# Patient Record
Sex: MALE | Race: BLACK OR AFRICAN AMERICAN | Employment: FULL TIME | URBAN - METROPOLITAN AREA
[De-identification: names, ages, dates, MRNs, and addresses within clinical notes are randomized per-mention and may not be internally consistent; named-entity substitution may affect disease eponyms.]

---

## 2017-10-17 LAB
CHOLEST SERPL-MCNC: 202 MG/DL (ref 100–199)
CREAT SERPL-MCNC: 0.91 MG/DL (ref 0.72–1.25)
GFR SERPL CREATININE-BSD FRML MDRD: >60 ML/MIN/1.73M2
GLUCOSE SERPL-MCNC: 142 MG/DL (ref 65–100)
HDLC SERPL-MCNC: 35 MG/DL
LDLC SERPL CALC-MCNC: 105 MG/DL
NONHDLC SERPL-MCNC: 167 MG/DL
POTASSIUM SERPL-SCNC: 3.9 MMOL/L (ref 3.5–5)
TRIGL SERPL-MCNC: 311 MG/DL

## 2018-02-22 ENCOUNTER — TRANSFERRED RECORDS (OUTPATIENT)
Dept: HEALTH INFORMATION MANAGEMENT | Facility: CLINIC | Age: 38
End: 2018-02-22

## 2018-02-22 LAB
HBA1C MFR BLD: 7.4 % (ref 0–5.7)
TSH SERPL-ACNC: 0.44 ULU/ML (ref 0.35–4.94)

## 2018-04-18 NOTE — PROGRESS NOTES
SUBJECTIVE:   Wenceslao Hutson is a 37 year old male who presents to clinic today for the following health issues:  Pt Transferring care from [facility]  He has uncontrolled Diabetes-takes Insulin    Diabetes Follow-up    Patient is checking blood sugars: three times daily.   Blood sugar testing frequency justification: Adjustment of medication(s)  Results are as follows:         am - 250-300              postprandial after lunch- 330         bedtime - 350-400    Diabetic concerns: blood sugar frequently over 200     Symptoms of hypoglycemia (low blood sugar): none     Paresthesias (numbness or burning in feet) or sores: No     Date of last diabetic eye exam: November 28, 2017    Hyperlipidemia Follow-Up      Rate your low fat/cholesterol diet?: fair    Taking statin?  Yes, no muscle aches from statin    Other lipid medications/supplements?:  none    Hypertension Follow-up      Outpatient blood pressures are not being checked.    Low Salt Diet: no added salt    BP Readings from Last 2 Encounters:   04/19/18 124/86   03/21/14 120/82     Hemoglobin A1C (%)   Date Value   03/21/2014 11.5 (H)     LDL Cholesterol Calculated (mg/dL)   Date Value   03/21/2014     Cannot estimate LDL when triglyceride exceeds 400 mg/dL     LDL Cholesterol Direct (mg/dL)   Date Value   03/21/2014 66       Amount of exercise or physical activity: None    Problems taking medications regularly: No    Medication side effects: none    Diet: low salt and low fat/cholesterol  1-2 meals per day            Problem list and histories reviewed & adjusted, as indicated.  Additional history: as documented    Patient Active Problem List   Diagnosis     CARDIOVASCULAR SCREENING; LDL GOAL LESS THAN 160     Hyperlipidemia associated with type 2 diabetes mellitus (H)     Type 2 diabetes mellitus without complication, with long-term current use of insulin (H)     Hyperlipidemia LDL goal <100     Essential hypertension     Uncontrolled diabetes mellitus type 2  without complications, unspecified long term insulin use status (H)     Past Surgical History:   Procedure Laterality Date     SHOULDER SURGERY  2007-Left     TONSILLECTOMY  12/22/2011    Procedure:TONSILLECTOMY; Tonsillectomy; Surgeon:NAA CASIANO; Location: OR       Social History   Substance Use Topics     Smoking status: Former Smoker     Years: 3.00     Types: Cigarettes     Start date: 6/15/2005     Smokeless tobacco: Former User     Quit date: 12/22/2011      Comment: smoke free household.     Alcohol use Yes      Comment: rare     Family History   Problem Relation Age of Onset     Breast Cancer Mother          Current Outpatient Prescriptions   Medication Sig Dispense Refill     ACE/ARB/ARNI NOT PRESCRIBED, INTENTIONAL, Please choose reason not prescribed,as he is allergic       amLODIPine (NORVASC) 5 MG tablet Take 5 mg by mouth daily       blood glucose monitoring (NO BRAND SPECIFIED) test strip Use to test blood sugars 4 times daily or as directed 400 strip 3     EPINEPHrine (EPIPEN/ADRENACLICK/OR ANY BX GENERIC EQUIV) 0.3 MG/0.3ML injection 2-pack Inject 0.3 mg into the muscle       insulin aspart (NOVOLOG PEN) 100 UNIT/ML injection Inject 10 Units Subcutaneous 2 times daily 9 mL 0     insulin glargine (LANTUS SOLOSTAR) 100 UNIT/ML injection Inject 15 Units Subcutaneous At Bedtime 9 mL 0     metFORMIN (GLUCOPHAGE) 1000 MG tablet Take 1,000 mg by mouth 2 times daily (with meals)       rosuvastatin (CRESTOR) 10 MG tablet Take 1 tablet (10 mg) by mouth daily 30 tablet 3     bacitracin ophthalmic ointment Apply 1/2 inch to right eye every 4 hours for 7 days. (Patient not taking: Reported on 4/19/2018) 3.5 g 0     [DISCONTINUED] insulin aspart (NOVOLOG PEN) 100 UNIT/ML injection Inject 10 Units Subcutaneous 2 times daily       [DISCONTINUED] insulin glargine (LANTUS SOLOSTAR) 100 UNIT/ML injection Inject 10 Units Subcutaneous daily (with dinner)       [DISCONTINUED] metFORMIN (GLUCOPHAGE) 500 MG  "tablet Take 1 tablet (500 mg) by mouth 2 times daily (with meals) 60 tablet 2     Allergies   Allergen Reactions     Lisinopril Swelling     probable     Atorvastatin      Recent Labs   Lab Test  04/19/18   0804  03/21/14   0925  02/24/12   1052  01/06/12   0935   A1C  11.9*  11.5*   --    --    LDL   --   Cannot estimate LDL when triglyceride exceeds 400 mg/dL  66   --    --    HDL   --   30*   --    --    TRIG   --   640*   --    --    ALT   --    --   79*  167*   CR   --   0.87  0.97   --    GFRESTIMATED   --   >90  >90   --    GFRESTBLACK   --   >90  >90   --    POTASSIUM   --   4.1  4.4   --    TSH   --   0.96   --    --       BP Readings from Last 3 Encounters:   04/19/18 124/86   03/21/14 120/82   08/21/12 120/88    Wt Readings from Last 3 Encounters:   04/19/18 205 lb 12.8 oz (93.4 kg)   03/21/14 204 lb 3.2 oz (92.6 kg)   08/21/12 208 lb 12.8 oz (94.7 kg)                  Labs reviewed in EPIC    Reviewed and updated as needed this visit by clinical staff  Tobacco  Meds  Fam Hx  Soc Hx      Reviewed and updated as needed this visit by Provider         ROS:    REVIEW OF SYSTEMS:  Complete/DM ROS -  C: NEGATIVE for fatigue, unexpected change in weight  E: NEGATIVE for acute vision problems or changes  R: NEGATIVE for significant cough or shortness of breath  CV: NEGATIVE for chest pain, palpitations or new or worsening peripheral edema  M: NEGATIVE for claudication, myalgias  N: NEGATIVE for weakness, dizziness, syncope, headaches  P: NEGATIVE for changes in mood or affect        OBJECTIVE:     /86  Pulse 106  Temp 97.5  F (36.4  C) (Oral)  Resp 16  Ht 5' 4.53\" (1.639 m)  Wt 205 lb 12.8 oz (93.4 kg)  SpO2 95%  BMI 34.75 kg/m2  Body mass index is 34.75 kg/(m^2).    EXAM:  /86  Pulse 106  Temp 97.5  F (36.4  C) (Oral)  Resp 16  Ht 5' 4.53\" (1.639 m)  Wt 205 lb 12.8 oz (93.4 kg)  SpO2 95%  BMI 34.75 kg/m2      GENERAL APPEARANCE: alert and no acute distress  PSYCH: mentation appears " "normal and affect normal/bright  NECK:  No carotid bruits.  RESP: lungs clear to auscultation - no rales, rhonchi or wheezes  CV: regular rate and rhythm, normal S1 S2, no S3 or S4 and no murmur, click or rub -  EXT: no cyanosis or edema in lower extremities  SKIN: no venous stasis changes  Foot Exam: normal DP and PT pulses, no trophic changes or ulcerative lesions and normal sensory exam        Diagnostic Test Results:  Results for orders placed or performed in visit on 04/19/18 (from the past 24 hour(s))   Hemoglobin A1c   Result Value Ref Range    Hemoglobin A1C 11.9 (H) 0 - 5.6 %       ASSESSMENT/PLAN:         BMI:   Estimated body mass index is 34.75 kg/(m^2) as calculated from the following:    Height as of this encounter: 5' 4.53\" (1.639 m).    Weight as of this encounter: 205 lb 12.8 oz (93.4 kg).   Weight management plan: low inessa diet/Exercise      1. Uncontrolled diabetes mellitus type 2 without complications, unspecified long term insulin use status (H)    - Hemoglobin A1c  - ACE/ARB/ARNI NOT PRESCRIBED, INTENTIONAL,; Please choose reason not prescribed,as he is allergic  - insulin glargine (LANTUS SOLOSTAR) 100 UNIT/ML injection; Inject 15 Units Subcutaneous At Bedtime  Dispense: 9 mL; Refill: 0  - insulin aspart (NOVOLOG PEN) 100 UNIT/ML injection; Inject 10 Units Subcutaneous 2 times daily  Dispense: 9 mL; Refill: 0  - Lipid panel reflex to direct LDL Fasting  - Comprehensive metabolic panel  - Albumin Random Urine Quantitative with Creat Ratio  - blood glucose monitoring (NO BRAND SPECIFIED) test strip; Use to test blood sugars 4 times daily or as directed  Dispense: 400 strip; Refill: 3  - MED THERAPY MANAGE REFERRAL  - DIABETES EDUCATOR REFERRAL  Increase Lantus to 15 units  See Diabetic ed  See me 2 months  Send accuchecks to Diabetic ed q 1 week  Discussed How to increase Lantus-  But can e mail us on My chart   2. Hyperlipidemia LDL goal <100  Advised change to Crestor as he is on Norvasc  SEE " EPIC care orders  The potential side effects of this medication have been discussed with the patient.  Call if any significant problems with these are experienced.    - rosuvastatin (CRESTOR) 10 MG tablet; Take 1 tablet (10 mg) by mouth daily  Dispense: 30 tablet; Refill: 3    3. Essential hypertension  controlled    Obesity  Advised strict Diabetic diet  Discussed   Keke Randle MD  [facility]

## 2018-04-19 ENCOUNTER — TELEPHONE (OUTPATIENT)
Dept: FAMILY MEDICINE | Facility: CLINIC | Age: 38
End: 2018-04-19

## 2018-04-19 ENCOUNTER — OFFICE VISIT (OUTPATIENT)
Dept: FAMILY MEDICINE | Facility: CLINIC | Age: 38
End: 2018-04-19
Payer: COMMERCIAL

## 2018-04-19 VITALS
WEIGHT: 205.8 LBS | BODY MASS INDEX: 34.29 KG/M2 | SYSTOLIC BLOOD PRESSURE: 124 MMHG | TEMPERATURE: 97.5 F | RESPIRATION RATE: 16 BRPM | DIASTOLIC BLOOD PRESSURE: 86 MMHG | HEIGHT: 65 IN | OXYGEN SATURATION: 95 % | HEART RATE: 106 BPM

## 2018-04-19 DIAGNOSIS — E66.811 CLASS 1 OBESITY DUE TO EXCESS CALORIES WITH SERIOUS COMORBIDITY AND BODY MASS INDEX (BMI) OF 34.0 TO 34.9 IN ADULT: ICD-10-CM

## 2018-04-19 DIAGNOSIS — I10 ESSENTIAL HYPERTENSION: ICD-10-CM

## 2018-04-19 DIAGNOSIS — E78.5 HYPERLIPIDEMIA LDL GOAL <100: ICD-10-CM

## 2018-04-19 DIAGNOSIS — E66.09 CLASS 1 OBESITY DUE TO EXCESS CALORIES WITH SERIOUS COMORBIDITY AND BODY MASS INDEX (BMI) OF 34.0 TO 34.9 IN ADULT: ICD-10-CM

## 2018-04-19 PROBLEM — E11.69 HYPERLIPIDEMIA ASSOCIATED WITH TYPE 2 DIABETES MELLITUS (H): Status: ACTIVE | Noted: 2017-07-10

## 2018-04-19 PROBLEM — Z79.4 TYPE 2 DIABETES MELLITUS WITHOUT COMPLICATION, WITH LONG-TERM CURRENT USE OF INSULIN (H): Status: ACTIVE | Noted: 2018-04-19

## 2018-04-19 PROBLEM — E11.9 TYPE 2 DIABETES MELLITUS WITHOUT COMPLICATION, WITH LONG-TERM CURRENT USE OF INSULIN (H): Status: ACTIVE | Noted: 2018-04-19

## 2018-04-19 LAB
ALBUMIN SERPL-MCNC: 4 G/DL (ref 3.4–5)
ALP SERPL-CCNC: 77 U/L (ref 40–150)
ALT SERPL W P-5'-P-CCNC: 42 U/L (ref 0–70)
ANION GAP SERPL CALCULATED.3IONS-SCNC: 12 MMOL/L (ref 3–14)
AST SERPL W P-5'-P-CCNC: 13 U/L (ref 0–45)
BILIRUB SERPL-MCNC: 0.4 MG/DL (ref 0.2–1.3)
BUN SERPL-MCNC: 15 MG/DL (ref 7–30)
CALCIUM SERPL-MCNC: 8.9 MG/DL (ref 8.5–10.1)
CHLORIDE SERPL-SCNC: 99 MMOL/L (ref 94–109)
CHOLEST SERPL-MCNC: 209 MG/DL
CO2 SERPL-SCNC: 21 MMOL/L (ref 20–32)
CREAT SERPL-MCNC: 0.86 MG/DL (ref 0.66–1.25)
CREAT UR-MCNC: 42 MG/DL
GFR SERPL CREATININE-BSD FRML MDRD: >90 ML/MIN/1.7M2
GLUCOSE SERPL-MCNC: 424 MG/DL (ref 70–99)
HBA1C MFR BLD: 11.9 % (ref 0–5.6)
HDLC SERPL-MCNC: 38 MG/DL
LDLC SERPL CALC-MCNC: ABNORMAL MG/DL
LDLC SERPL DIRECT ASSAY-MCNC: 127 MG/DL
MICROALBUMIN UR-MCNC: 7 MG/L
MICROALBUMIN/CREAT UR: 17.38 MG/G CR (ref 0–17)
NONHDLC SERPL-MCNC: 171 MG/DL
POTASSIUM SERPL-SCNC: 4.4 MMOL/L (ref 3.4–5.3)
PROT SERPL-MCNC: 7.3 G/DL (ref 6.8–8.8)
SODIUM SERPL-SCNC: 132 MMOL/L (ref 133–144)
TRIGL SERPL-MCNC: 700 MG/DL

## 2018-04-19 PROCEDURE — 83036 HEMOGLOBIN GLYCOSYLATED A1C: CPT | Performed by: FAMILY MEDICINE

## 2018-04-19 PROCEDURE — 99214 OFFICE O/P EST MOD 30 MIN: CPT | Performed by: FAMILY MEDICINE

## 2018-04-19 PROCEDURE — 82043 UR ALBUMIN QUANTITATIVE: CPT | Performed by: FAMILY MEDICINE

## 2018-04-19 PROCEDURE — 80053 COMPREHEN METABOLIC PANEL: CPT | Performed by: FAMILY MEDICINE

## 2018-04-19 PROCEDURE — 36415 COLL VENOUS BLD VENIPUNCTURE: CPT | Performed by: FAMILY MEDICINE

## 2018-04-19 PROCEDURE — 83721 ASSAY OF BLOOD LIPOPROTEIN: CPT | Mod: 59 | Performed by: FAMILY MEDICINE

## 2018-04-19 PROCEDURE — 80061 LIPID PANEL: CPT | Performed by: FAMILY MEDICINE

## 2018-04-19 RX ORDER — ROSUVASTATIN CALCIUM 10 MG/1
10 TABLET, COATED ORAL DAILY
Qty: 30 TABLET | Refills: 3 | Status: SHIPPED | OUTPATIENT
Start: 2018-04-19 | End: 2018-08-09

## 2018-04-19 RX ORDER — SIMVASTATIN 20 MG
20 TABLET ORAL AT BEDTIME
COMMUNITY
Start: 2018-02-22 | End: 2018-04-19

## 2018-04-19 RX ORDER — AMLODIPINE BESYLATE 5 MG/1
5 TABLET ORAL DAILY
COMMUNITY
Start: 2018-02-22 | End: 2018-08-09

## 2018-04-19 RX ORDER — EPINEPHRINE 0.3 MG/.3ML
0.3 INJECTION SUBCUTANEOUS
COMMUNITY
Start: 2016-03-02 | End: 2021-03-08

## 2018-04-19 ASSESSMENT — PAIN SCALES - GENERAL: PAINLEVEL: NO PAIN (0)

## 2018-04-19 NOTE — Clinical Note
Please abstract the following data from this visit with this patient into the appropriate field in Epic:  Eye exam with ophthalmology on this date: 11/28/17 [facility] A1C 7.4 02/22/18, Lipids 10/17/17 all results in care everywhere.

## 2018-04-19 NOTE — TELEPHONE ENCOUNTER
Notes Recorded by Keke Randle MD on 4/19/2018 at 4:31 PM  LEFT MESSAGE TO CALL  Blood sugar very High  Pt should Give Himself an extra 6 units Novolog  Check accuchecks and send us readings  Make appointment with Diabetic ed  LEFT MESSAGE TO CALL    Voice mail left for patient to call RN hotline at [phone].    Eulalia Luis RN

## 2018-04-19 NOTE — MR AVS SNAPSHOT
After Visit Summary   4/19/2018    Wenceslao Hutson    MRN: [id number]           Patient Information     Date Of Birth          1980        Visit Information        Provider Department      4/19/2018 7:40 AM Keke Randle MD [facility]        Today's Diagnoses     Uncontrolled diabetes mellitus type 2 without complications, unspecified long term insulin use status (H)    -  1    Hyperlipidemia LDL goal <100        Essential hypertension          Care Instructions    [facility]    If you have any questions regarding to your visit please contact your care team:       Team Red:   Clinic Hours Telephone Number   Dr. Michelle Quinteros, NP   7am-7pm  Monday - Thursday   7am-5pm  Fridays  [phone]  (Appointment scheduling available 24/7)    Questions about your visit?   Team Line  [phone]   Urgent [facility] - [place] [facility] - 11am-9pm Monday-Friday Saturday-Sunday- 9am-5pm   [place] - 5pm-9pm Monday-Friday Saturday-Sunday- 9am-5pm  [phone] - [facility]  [phone] - [place]       What options do I have for visits at the clinic other than the traditional office visit?  To expand how we care for you, many of our providers are utilizing electronic visits (e-visits) and telephone visits, when medically appropriate, for interactions with their patients rather than a visit in the clinic.   We also offer nurse visits for many medical concerns. Just like any other service, we will bill your insurance company for this type of visit based on time spent on the phone with your provider. Not all insurance companies cover these visits. Please check with your medical insurance if this type of visit is covered. You will be responsible for any charges that are not paid by your insurance.      E-visits via Inflection:  generally incur a $35.00 fee.  Telephone visits:  Time spent on the phone: *charged based on  time that is spent on the phone in increments of 10 minutes. Estimated cost:   5-10 mins $30.00   11-20 mins. $59.00   21-30 mins. $85.00     Use Screenleaphart (secure email communication and access to your chart) to send your primary care provider a message or make an appointment. Ask someone on your Team how to sign up for FreshBookst.  For a Price Quote for your services, please call our RapidMind Line at [phone].      As always, Thank you for trusting us with your health care needs!            Follow-ups after your visit        Additional Services     DIABETES EDUCATOR REFERRAL       Your provider has referred you to Diabetes Education: FMG: Diabetes Education - All [facility] [phone]   [email or web address]     If an urgent visit is needed or A1C is above 12, Care Team to call the Diabetes  Education Team at [phone] or send an In Basket message to the Diabetes Education Pool (P DIAB ED-PATIENT CARE).    This is a Previous Diagnosis: Follow-up DSMT - 2 hours.  Type of diabetes is Type 2 - On Insulin   Medicare covers: 10 hours of initial DSMT in 12 month period from the time of first visit, plus 2 hours of follow-up DSMT annually, and additional hours as requested for insulin training.         Diabetes Co-Morbidities: dyslipidemia and hypertension               A1C Goal:  <7.0       A1C is: Lab Results       Component                Value               Date                       A1C                      11.9                04/19/2018              MEDICAL NUTRITION THERAPY (MNT) for Diabetes    Medical Nutrition Therapy with a Registered Dietitian can be provided in coordination with Diabetes Self-Management Training to assist in achieving optimal diabetes management.    MNT Type and Hours: Previous diagnosis: Annual follow-up MNT - 2 hours                       Medicare will cover: 3 hours initial MNT in 12 month period after first visit, plus 2  hours of follow-up MNT annually                                                          A1C is: Lab Results       Component                Value               Date                       A1C                      11.9                04/19/2018            If an urgent visit is needed or A1C is above 12, Care Team to call the diabetes education team at [phone] or send a message to the diabetes education pool (P DIAB ED-PATIENT CARE).    Diabetes education focus: Comprehensive Knowledge Assessment and Instruction, Knowledge: Healthy Eating, Being Active, Monitoring Blood Sugar, Taking Medication, Problem Solving/Goal Setting, Reducing Risks (Preventing Acute and Chronic Diabetes Complications) and Healthy Coping and Medication Start: Insulin: adjust dose      Education needs: Additional Insulin Training                                                                                                                                                      Please be aware that coverage of these services is subject to the terms and limitations of your health insurance plan.  Call member services at your health plan to determine Diabetes Self-Management Training benefits and ask which blood glucose monitor brands are covered by your plan.      Please bring the following to your appointment:    -   List of current medications   -   List of blood glucose monitor brands that are covered by your insurance plan  -   Blood glucose monitor and log book  -   Food records for the 3 days prior to your visit            MED THERAPY MANAGE REFERRAL       Your provider has referred you to: **[facility]uling (numerous locations) [phone]   [email or web address]  FMG: [facility] [phone]   [email or web address]    Reason for Referral:     The [facility] department  will contact you to schedule an appointment.  You may also schedule the appointment by calling [phone].  For [place] [facility] [place] patients, please call [phone] to confirm/schedule your appointment on the next business day.    This service is designed to help you get the most from your medications.  A specially trained Pharmacist will work closely with you and your providers to solve any questions, concerns, issues or problems related to your medications.    Please bring all of your prescription and non-prescription medications (such as vitamins, over-the-counter medications, and herbals) or a detailed medication list to your appointment.    If you have a glucose meter or other home monitoring information, please also bring this to your appointment (i.e. blood glucose log, blood pressure log, pain log, etc.).                  Who to contact     If you have questions or need follow up information about today's clinic visit or your schedule please contact [facility] directly at [phone].  Normal or non-critical lab and imaging results will be communicated to you by Haoguihuahart, letter or phone within 4 business days after the clinic has received the results. If you do not hear from us within 7 days, please contact the clinic through [place] [employer] or phone. If you have a critical or abnormal lab result, we will notify you by phone as soon as possible.  Submit refill requests through Lumi Mobile or call your pharmacy and they will forward the refill request to us. Please allow 3 business days for your refill to be completed.          Additional Information About Your Visit        HaoguihuaharChipCare Information     Lumi Mobile gives you secure access to your electronic health record. If you see a primary care provider, you can also send messages to your care team and make appointments. If you have questions, please call your primary care clinic.  If you do not have a primary care provider, please call [phone]  "and they will assist you.        Care EveryWhere ID     This is your Care EveryWhere ID. This could be used by other organizations to access your [place] medical records  [id number]        Your Vitals Were     Pulse Temperature Respirations Height Pulse Oximetry BMI (Body Mass Index)    106 97.5  F (36.4  C) (Oral) 16 5' 4.53\" (1.639 m) 95% 34.75 kg/m2       Blood Pressure from Last 3 Encounters:   04/19/18 124/86   03/21/14 120/82   08/21/12 120/88    Weight from Last 3 Encounters:   04/19/18 205 lb 12.8 oz (93.4 kg)   03/21/14 204 lb 3.2 oz (92.6 kg)   08/21/12 208 lb 12.8 oz (94.7 kg)              We Performed the Following     Albumin Random Urine Quantitative with Creat Ratio     Comprehensive metabolic panel     DIABETES EDUCATOR REFERRAL     Hemoglobin A1c     Lipid panel reflex to direct LDL Fasting     MED THERAPY MANAGE REFERRAL          Today's Medication Changes          These changes are accurate as of 4/19/18  8:38 AM.  If you have any questions, ask your nurse or doctor.               Start taking these medicines.        Dose/Directions    ACE/ARB/ARNI NOT PRESCRIBED (INTENTIONAL)   Used for:  Uncontrolled diabetes mellitus type 2 without complications, unspecified long term insulin use status (H)   Started by:  Keke Randle MD        Please choose reason not prescribed,as he is allergic   Refills:  0       blood glucose monitoring test strip   Commonly known as:  no brand specified   Used for:  Uncontrolled diabetes mellitus type 2 without complications, unspecified long term insulin use status (H)   Started by:  Keke Randle MD        Use to test blood sugars 4 times daily or as directed   Quantity:  400 strip   Refills:  3       rosuvastatin 10 MG tablet   Commonly known as:  CRESTOR   Used for:  Hyperlipidemia LDL goal <100   Started by:  Keke Randle MD        Dose:  10 mg   Take 1 tablet (10 mg) by mouth daily   Quantity:  30 tablet   Refills:  3         These medicines have changed or " have updated prescriptions.        Dose/Directions    insulin glargine 100 UNIT/ML injection   Commonly known as:  LANTUS SOLOSTAR   This may have changed:    - how much to take  - when to take this   Used for:  Uncontrolled diabetes mellitus type 2 without complications, unspecified long term insulin use status (H)   Changed by:  Keke Randle MD        Dose:  15 Units   Inject 15 Units Subcutaneous At Bedtime   Quantity:  9 mL   Refills:  0       metFORMIN 1000 MG tablet   Commonly known as:  GLUCOPHAGE   This may have changed:  Another medication with the same name was removed. Continue taking this medication, and follow the directions you see here.   Changed by:  Keke Randle MD        Dose:  1000 mg   Take 1,000 mg by mouth 2 times daily (with meals)   Refills:  0         Stop taking these medicines if you haven't already. Please contact your care team if you have questions.     simvastatin 20 MG tablet   Commonly known as:  ZOCOR   Stopped by:  Keke Randle MD                Where to get your medicines      These medications were sent to [facility] [address] [place], [place] [address]E [place] AT [place] [address], [facility] [address]     Phone:  [phone]     blood glucose monitoring test strip    insulin aspart 100 UNIT/ML injection    insulin glargine 100 UNIT/ML injection    rosuvastatin 10 MG tablet         Some of these will need a paper prescription and others can be bought over the counter.  Ask your nurse if you have questions.     You don't need a prescription for these medications     ACE/ARB/ARNI NOT PRESCRIBED (INTENTIONAL)                Primary Care Provider Office Phone # Fax Erica Agrawal PA-C [phone] [address] [address]        Equal Access to Services     CRISTIANO BHANDARI AH: Airam Esteban, wage keene, qaybta arik araujo.  So [facility] [phone].    ATENCIÓN: Si sebas benavides, tiene a donato disposición servicios gratuitos de asistencia lingüística. Vicky more [phone].    We comply with applicable federal civil rights laws and [place] laws. We do not discriminate on the basis of race, color, national origin, age, disability, sex, sexual orientation, or gender identity.            Thank you!     Thank you for choosing [facility]3] [facility]  for your [facility]. Our goal is always to provide you with excellent care. Hearing back from our patients is one way we can continue to improve our services. Please take a few minutes to complete the written survey that you may receive in the mail after your visit with us. Thank you!             Your Updated Medication List - Protect others around you: Learn how to safely use, store and throw away your medicines at www.disposemymeds.org.          This list is accurate as of 4/19/18  8:38 AM.  Always use your most recent med list.                   Brand Name Dispense Instructions for use Diagnosis    ACE/ARB/ARNI NOT PRESCRIBED (INTENTIONAL)      Please choose reason not prescribed,as he is allergic    Uncontrolled diabetes mellitus type 2 without complications, unspecified long term insulin use status (H)       amLODIPine 5 MG tablet    NORVASC     Take 5 mg by mouth daily        bacitracin ophthalmic ointment     3.5 g    Apply 1/2 inch to right eye every 4 hours for 7 days.    Chalazions       blood glucose monitoring test strip    no brand specified    400 strip    Use to test blood sugars 4 times daily or as directed    Uncontrolled diabetes mellitus type 2 without complications, unspecified long term insulin use status (H)       EPINEPHrine 0.3 MG/0.3ML injection 2-pack    EPIPEN/ADRENACLICK/or ANY BX GENERIC EQUIV     Inject 0.3 mg into the muscle        insulin aspart 100 UNIT/ML injection    NovoLOG PEN    9 mL    Inject 10 Units Subcutaneous 2 times daily    Uncontrolled diabetes mellitus type  2 without complications, unspecified long term insulin use status (H)       insulin glargine 100 UNIT/ML injection    LANTUS SOLOSTAR    9 mL    Inject 15 Units Subcutaneous At Bedtime    Uncontrolled diabetes mellitus type 2 without complications, unspecified long term insulin use status (H)       metFORMIN 1000 MG tablet    GLUCOPHAGE     Take 1,000 mg by mouth 2 times daily (with meals)        rosuvastatin 10 MG tablet    CRESTOR    30 tablet    Take 1 tablet (10 mg) by mouth daily    Hyperlipidemia LDL goal <100

## 2018-04-19 NOTE — PATIENT INSTRUCTIONS
[facility]    If you have any questions regarding to your visit please contact your care team:       Team Red:   Clinic Hours Telephone Number   Dr. Michelle Quinteros, NP   7am-7pm  Monday - Thursday   7am-5pm  Fridays  [phone]  (Appointment scheduling available 24/7)    Questions about your visit?   Team Line  [phone]   [facility] 11am-9pm Monday-Friday Saturday-Sunday- 9am-5pm   [place] - 5pm-9pm Monday-Friday Saturday-Sunday- 9am-5pm  [phone] - [place]   [phone] - [place]       What options do I have for visits at the clinic other than the traditional office visit?  To expand how we care for you, many of our providers are utilizing electronic visits (e-visits) and telephone visits, when medically appropriate, for interactions with their patients rather than a visit in the clinic.   We also offer nurse visits for many medical concerns. Just like any other service, we will bill your insurance company for this type of visit based on time spent on the phone with your provider. Not all insurance companies cover these visits. Please check with your medical insurance if this type of visit is covered. You will be responsible for any charges that are not paid by your insurance.      E-visits via [employer]:  generally incur a $35.00 fee.  Telephone visits:  Time spent on the phone: *charged based on time that is spent on the phone in increments of 10 minutes. Estimated cost:   5-10 mins $30.00   11-20 mins. $59.00   21-30 mins. $85.00     Use [employer] (secure email communication and access to your chart) to send your primary care provider a message or make an appointment. Ask someone on your Team how to sign up for [employer].  For a Price Quote for your services, please call our Consumer Price Line at [phone].      As always, Thank you for trusting us with your health care needs!

## 2018-04-20 NOTE — TELEPHONE ENCOUNTER
Pt called back to the RN hotline and left message requesting call back to [phone].      Emilia Mota RN  [facility]

## 2018-04-20 NOTE — TELEPHONE ENCOUNTER
FYI- patient did not answer his phone or call back yesterday to get message below about taking extra 6 units of Novolog for high BG.   Attempted to call patient again today but got VM  Please ask what his BG results are today  Also update him on the rest of the result note below from today    Result Notes   Notes Recorded by Devon Capone RN on 4/20/2018 at 8:10 AM  Left message on voicemail for patient to return call to RN hotline at # [phone].    See phone encounter started  Mychart result note sent    Devon Capone RN    ------    Notes Recorded by Keke Randle MD on 4/20/2018 at 7:45 AM  Graham Hanley,  Your Blood sugars are very High  Please check Your Blood sugars and Give your self Novolog  I will Give you a sliding scale to take your Novolog    150-200  - 2 units Novolog  201-250-  4 units Novolog  251-300- 6 units Novolog  301-350-  8 units of Novolog  Please call if Higher than this  Please increase lantus as recommended   Please make appointment with Diabetic Educator or our Pharmacist this month so we can get your sugars to Goal.  Your  cholesterol is High -please change to Crestor as recommended   Take care  Keke Randle MD

## 2018-04-24 NOTE — TELEPHONE ENCOUNTER
Sent pt a my chart to check if was informed of the results.  Pt does have upcoming appts scheduled as stated below.  Yesika Moseley RN

## 2018-04-26 NOTE — TELEPHONE ENCOUNTER
Left message on voicemail for patient to return call to RN hotline at # [phone].    BG from 4/19/18 labs were high and Dr. Randle had recommended extra 6 units of Novolog that day. However, patient did not call back  How are his BGs now?    Devon Capone RN

## 2018-05-01 NOTE — TELEPHONE ENCOUNTER
Noted, patient has appointment with Diabetic Ed on 5/15.  Patient has not viewed ShoutOut message from 4/24.  Patient has not returned VM x6 attempts.     Shaylee Vick RN

## 2018-06-14 ENCOUNTER — TELEPHONE (OUTPATIENT)
Dept: PHARMACY | Facility: CLINIC | Age: 38
End: 2018-06-14

## 2018-06-14 NOTE — TELEPHONE ENCOUNTER
Pt no-showed scheduled MTM appt on 5/15/18 and has not responded to contact attempts to reschedule.  We will stop reaching out to him at this time, but please let us know if we can assist in his care in the future.  Routing to PCP - thank you for the referral!    Mel Wade, PharmD, BCACP  Medication Therapy Management Provider  Pager: [phone]

## 2018-07-26 ENCOUNTER — TELEPHONE (OUTPATIENT)
Dept: FAMILY MEDICINE | Facility: CLINIC | Age: 38
End: 2018-07-26

## 2018-07-26 NOTE — LETTER
July 26, 2018          Wenceslao Hutson,  [address]  [place] [address]        Dear Wenceslao Hutson      Monitoring and managing your preventative and chronic health conditions are very important to us. Our records indicate that you have not scheduled for Diabetic Check  which was recommended by Dr. Randle      If you have received your health care elsewhere, please call the clinic so the information can be documented in your chart.    Please call [phone] or message us through your [email or web address] account to schedule an appointment or provide information for your chart.     Feel free to contact us if you have any questions or concerns!    I look forward to seeing you and working with you on your health care needs.     Sincerely,         Keke Randle / JAYJAY

## 2018-07-26 NOTE — TELEPHONE ENCOUNTER
Panel Management Review      Patient has the following on his problem list:     Diabetes    ASA: Passed    Last A1C  Lab Results   Component Value Date    A1C 11.9 04/19/2018    A1C 7.4 02/22/2018    A1C 11.5 03/21/2014     A1C tested: FAILED    Last LDL:    Lab Results   Component Value Date    CHOL 209 04/19/2018     Lab Results   Component Value Date    HDL 38 04/19/2018     Lab Results   Component Value Date    LDL  04/19/2018     Cannot estimate LDL when triglyceride exceeds 400 mg/dL     04/19/2018     Lab Results   Component Value Date    TRIG 700 04/19/2018     Lab Results   Component Value Date    CHOLHDLRATIO 7.6 03/21/2014     Lab Results   Component Value Date    NHDL 171 04/19/2018       Is the patient on a Statin? YES             Is the patient on Aspirin? YES    Medications     HMG CoA Reductase Inhibitors    rosuvastatin (CRESTOR) 10 MG tablet          Last three blood pressure readings:  BP Readings from Last 3 Encounters:   04/19/18 124/86   03/21/14 120/82   08/21/12 120/88       Date of last diabetes office visit: 4/19/2018     Tobacco History:     History   Smoking Status     Former Smoker     Years: 3.00     Types: Cigarettes     Start date: 6/15/2005   Smokeless Tobacco     Former User     Quit date: 12/22/2011     Comment: smoke free household.         Hypertension   Last three blood pressure readings:  BP Readings from Last 3 Encounters:   04/19/18 124/86   03/21/14 120/82   08/21/12 120/88     Blood pressure: Passed    HTN Guidelines:  Age 18-59 BP range:  Less than 140/90  Age 60-85 with Diabetes:  Less than 140/90  Age 60-85 without Diabetes:  less than 150/90      Composite cancer screening  Chart review shows that this patient is due/due soon for the following None  Summary:    Patient is due/failing the following:   FOLLOW UP    Action needed:   Patient needs office visit for Diabetes check.    Type of outreach:    Sent letter.    Questions for provider review:    None                                                                                                                                     Sandra Velazquez routed to Care Team .

## 2018-08-09 ENCOUNTER — OFFICE VISIT (OUTPATIENT)
Dept: FAMILY MEDICINE | Facility: CLINIC | Age: 38
End: 2018-08-09
Payer: COMMERCIAL

## 2018-08-09 VITALS
OXYGEN SATURATION: 97 % | BODY MASS INDEX: 31.99 KG/M2 | WEIGHT: 192 LBS | TEMPERATURE: 97.8 F | SYSTOLIC BLOOD PRESSURE: 130 MMHG | RESPIRATION RATE: 16 BRPM | DIASTOLIC BLOOD PRESSURE: 80 MMHG | HEIGHT: 65 IN | HEART RATE: 102 BPM

## 2018-08-09 DIAGNOSIS — E66.811 CLASS 1 OBESITY DUE TO EXCESS CALORIES WITH SERIOUS COMORBIDITY AND BODY MASS INDEX (BMI) OF 34.0 TO 34.9 IN ADULT: ICD-10-CM

## 2018-08-09 DIAGNOSIS — I10 ESSENTIAL HYPERTENSION: Primary | ICD-10-CM

## 2018-08-09 DIAGNOSIS — E78.5 HYPERLIPIDEMIA ASSOCIATED WITH TYPE 2 DIABETES MELLITUS (H): ICD-10-CM

## 2018-08-09 DIAGNOSIS — E66.09 CLASS 1 OBESITY DUE TO EXCESS CALORIES WITH SERIOUS COMORBIDITY AND BODY MASS INDEX (BMI) OF 34.0 TO 34.9 IN ADULT: ICD-10-CM

## 2018-08-09 DIAGNOSIS — E11.69 HYPERLIPIDEMIA ASSOCIATED WITH TYPE 2 DIABETES MELLITUS (H): ICD-10-CM

## 2018-08-09 DIAGNOSIS — E11.9 TYPE 2 DIABETES MELLITUS WITHOUT COMPLICATION, WITHOUT LONG-TERM CURRENT USE OF INSULIN (H): ICD-10-CM

## 2018-08-09 LAB — HBA1C MFR BLD: 5.6 % (ref 0–5.6)

## 2018-08-09 PROCEDURE — 36415 COLL VENOUS BLD VENIPUNCTURE: CPT | Performed by: FAMILY MEDICINE

## 2018-08-09 PROCEDURE — 83036 HEMOGLOBIN GLYCOSYLATED A1C: CPT | Performed by: FAMILY MEDICINE

## 2018-08-09 PROCEDURE — 80061 LIPID PANEL: CPT | Performed by: FAMILY MEDICINE

## 2018-08-09 PROCEDURE — 80048 BASIC METABOLIC PNL TOTAL CA: CPT | Performed by: FAMILY MEDICINE

## 2018-08-09 PROCEDURE — 99214 OFFICE O/P EST MOD 30 MIN: CPT | Performed by: FAMILY MEDICINE

## 2018-08-09 RX ORDER — ROSUVASTATIN CALCIUM 10 MG/1
10 TABLET, COATED ORAL DAILY
Qty: 30 TABLET | Refills: 3 | Status: SHIPPED | OUTPATIENT
Start: 2018-08-09 | End: 2018-11-30

## 2018-08-09 RX ORDER — AMLODIPINE BESYLATE 5 MG/1
5 TABLET ORAL DAILY
Qty: 90 TABLET | Refills: 3 | Status: SHIPPED | OUTPATIENT
Start: 2018-08-09 | End: 2018-11-30

## 2018-08-09 RX ORDER — GLUCOSAMINE HCL/CHONDROITIN SU 500-400 MG
CAPSULE ORAL
Qty: 100 EACH | Refills: 3 | Status: SHIPPED | OUTPATIENT
Start: 2018-08-09 | End: 2021-01-11

## 2018-08-09 RX ORDER — LANCETS
EACH MISCELLANEOUS
Qty: 100 EACH | Refills: 6 | Status: SHIPPED | OUTPATIENT
Start: 2018-08-09 | End: 2021-01-11

## 2018-08-09 ASSESSMENT — PAIN SCALES - GENERAL: PAINLEVEL: MODERATE PAIN (5)

## 2018-08-09 NOTE — PROGRESS NOTES
SUBJECTIVE:   Wenceslao Hutson is a 38 year old male who presents to clinic today for the following health issues:        Diabetes Follow-up      Patient is checking blood sugars: not at all    Diabetic concerns: None     Symptoms of hypoglycemia (low blood sugar): none     Paresthesias (numbness or burning in feet) or sores: No     Date of last diabetic eye exam: within last 6 months    BP Readings from Last 2 Encounters:   04/19/18 124/86   03/21/14 120/82     Hemoglobin A1C (%)   Date Value   04/19/2018 11.9 (H)   02/22/2018 7.4 (A)     LDL Cholesterol Calculated (mg/dL)   Date Value   04/19/2018     Cannot estimate LDL when triglyceride exceeds 400 mg/dL   10/17/2017 105     LDL Cholesterol Direct (mg/dL)   Date Value   04/19/2018 127 (H)       Diabetes Management Resources  Hyperlipidemia Follow-Up      Rate your low fat/cholesterol diet?: poor    Taking statin?  Yes, no muscle aches from statin    Other lipid medications/supplements?:  none      Amount of exercise or physical activity: None    Problems taking medications regularly: No    Medication side effects: none    Diet:  Diabetic        Hypertension Follow-up      Outpatient blood pressures are not being checked.    Low Salt Diet: no added salt      Problem list and histories reviewed & adjusted, as indicated.  Additional history: as documented    Patient Active Problem List   Diagnosis     CARDIOVASCULAR SCREENING; LDL GOAL LESS THAN 160     Hyperlipidemia associated with type 2 diabetes mellitus (H)     Type 2 diabetes mellitus without complication, with long-term current use of insulin (H)     Hyperlipidemia LDL goal <100     Essential hypertension     Uncontrolled diabetes mellitus type 2 without complications, unspecified long term insulin use status (H)     Class 1 obesity due to excess calories with serious comorbidity and body mass index (BMI) of 34.0 to 34.9 in adult     Past Surgical History:   Procedure Laterality Date     SHOULDER SURGERY   2007-Left     TONSILLECTOMY  12/22/2011    Procedure:TONSILLECTOMY; Tonsillectomy; Surgeon:NAA CASIANO; Location: OR       Social History   Substance Use Topics     Smoking status: Former Smoker     Years: 3.00     Types: Cigarettes     Start date: 6/15/2005     Smokeless tobacco: Former User     Quit date: 12/22/2011      Comment: smoke free household.     Alcohol use Yes      Comment: rare     Family History   Problem Relation Age of Onset     Breast Cancer Mother          Current Outpatient Prescriptions   Medication Sig Dispense Refill     ACE/ARB/ARNI NOT PRESCRIBED, INTENTIONAL, Please choose reason not prescribed,as he is allergic       alcohol swab prep pads Use to swab area of injection/sydni as directed. 100 each 3     amLODIPine (NORVASC) 5 MG tablet Take 1 tablet (5 mg) by mouth daily 90 tablet 3     blood glucose calibration (NO BRAND SPECIFIED) solution To accompany: Blood Glucose Monitor Brands: per insurance. 1 Bottle 3     blood glucose monitoring (NO BRAND SPECIFIED) meter device kit Use to test blood sugar 1 times daily or as directed. Preferred blood glucose meter OR supplies to accompany: Blood Glucose Monitor Brands: per insurance. 1 kit 0     blood glucose monitoring (NO BRAND SPECIFIED) test strip Use to test blood sugar 1 times daily or as directed. To accompany: Blood Glucose Monitor Brands: per insurance. 100 strip 6     EPINEPHrine (EPIPEN/ADRENACLICK/OR ANY BX GENERIC EQUIV) 0.3 MG/0.3ML injection 2-pack Inject 0.3 mg into the muscle       metFORMIN (GLUCOPHAGE) 1000 MG tablet Take 1 tablet (1,000 mg) by mouth 2 times daily (with meals) 60 tablet 3     rosuvastatin (CRESTOR) 10 MG tablet Take 1 tablet (10 mg) by mouth daily 30 tablet 3     thin (NO BRAND SPECIFIED) lancets Use with lanceting device. To accompany: Blood Glucose Monitor Brands: per insurance. 100 each 6     bacitracin ophthalmic ointment Apply 1/2 inch to right eye every 4 hours for 7 days. (Patient not taking:  Reported on 4/19/2018) 3.5 g 0     blood glucose monitoring (NO BRAND SPECIFIED) test strip Use to test blood sugars 4 times daily or as directed (Patient not taking: Reported on 8/9/2018) 400 strip 3     insulin aspart (NOVOLOG PEN) 100 UNIT/ML injection Inject 10 Units Subcutaneous 2 times daily (Patient not taking: Reported on 8/9/2018) 9 mL 0     insulin glargine (LANTUS SOLOSTAR) 100 UNIT/ML injection Inject 15 Units Subcutaneous At Bedtime (Patient not taking: Reported on 8/9/2018) 9 mL 0     [DISCONTINUED] amLODIPine (NORVASC) 5 MG tablet Take 5 mg by mouth daily       [DISCONTINUED] metFORMIN (GLUCOPHAGE) 1000 MG tablet Take 1,000 mg by mouth 2 times daily (with meals)       [DISCONTINUED] rosuvastatin (CRESTOR) 10 MG tablet Take 1 tablet (10 mg) by mouth daily 30 tablet 3     Allergies   Allergen Reactions     Lisinopril Swelling     probable     Atorvastatin      Recent Labs   Lab Test  08/09/18   1420  04/19/18   0804  04/19/18   0800 02/22/18 10/17/17  03/21/14   0925   02/24/12   1052   01/06/12   0935   A1C  5.6  11.9*   --   7.4*   --   11.5*   < >   --    --    --    LDL   --    --   Cannot estimate LDL when triglyceride exceeds 400 mg/dL  127*   --   105  Cannot estimate LDL when triglyceride exceeds 400 mg/dL  66   < >   --    --    --    HDL   --    --   38*   --   35*  30*   --    --    --    --    TRIG   --    --   700*   --   311*  640*   --    --    --    --    ALT   --    --   42   --    --    --    --   79*   --   167*   CR   --    --   0.86   --   0.91  0.87   --   0.97   < >   --    GFRESTIMATED   --    --   >90   --   >60  >90   --   >90   < >   --    GFRESTBLACK   --    --   >90   --   >60  >90   --   >90   < >   --    POTASSIUM   --    --   4.4   --   3.9  4.1   --   4.4   < >   --    TSH   --    --    --   0.44   --   0.96   --    --    --    --     < > = values in this interval not displayed.      BP Readings from Last 3 Encounters:   08/09/18 130/80   04/19/18 124/86   03/21/14  "120/82    Wt Readings from Last 3 Encounters:   08/09/18 192 lb (87.1 kg)   04/19/18 205 lb 12.8 oz (93.4 kg)   03/21/14 204 lb 3.2 oz (92.6 kg)                  Labs reviewed in EPIC    Reviewed and updated as needed this visit by clinical staff       Reviewed and updated as needed this visit by Provider         ROS:  CONSTITUTIONAL: NEGATIVE for fever, chills, change in weight  ENT/MOUTH: NEGATIVE for ear, mouth and throat problems  RESP: NEGATIVE for significant cough or SOB  CV: NEGATIVE for chest pain, palpitations or peripheral edema  GI: NEGATIVE for nausea, abdominal pain, heartburn, or change in bowel habits  MUSCULOSKELETAL: NEGATIVE for significant arthralgias or myalgia  ROS otherwise negative    OBJECTIVE:     /80  Pulse 102  Temp 97.8  F (36.6  C) (Oral)  Resp 16  Ht 5' 4.5\" (1.638 m)  Wt 192 lb (87.1 kg)  SpO2 97%  BMI 32.45 kg/m2  Body mass index is 32.45 kg/(m^2).  GENERAL: healthy, alert and no distress  NECK: no adenopathy, no asymmetry, masses, or scars and thyroid normal to palpation  RESP: lungs clear to auscultation - no rales, rhonchi or wheezes  CV: regular rate and rhythm, normal S1 S2, no S3 or S4, no murmur, click or rub, no peripheral edema and peripheral pulses strong  ABDOMEN: soft, nontender, no hepatosplenomegaly, no masses and bowel sounds normal  MS: no gross musculoskeletal defects noted, no edema    Diagnostic Test Results:  Results for orders placed or performed in visit on 08/09/18 (from the past 24 hour(s))   Hemoglobin A1c   Result Value Ref Range    Hemoglobin A1C 5.6 0 - 5.6 %       ASSESSMENT/PLAN:         BMI:   Estimated body mass index is 32.45 kg/(m^2) as calculated from the following:    Height as of this encounter: 5' 4.5\" (1.638 m).    Weight as of this encounter: 192 lb (87.1 kg).   Weight management plan: Low inessa diet/Exercise      1. Essential hypertension  controlled  - amLODIPine (NORVASC) 5 MG tablet; Take 1 tablet (5 mg) by mouth daily  Dispense: " 90 tablet; Refill: 3    2. Class 1 obesity due to excess calories with serious comorbidity and body mass index (BMI) of 34.0 to 34.9 in adult  As above    3. Hyperlipidemia associated with type 2 diabetes mellitus (H)  Labs pending     4. Hyperlipidemia LDL goal <100    - rosuvastatin (CRESTOR) 10 MG tablet; Take 1 tablet (10 mg) by mouth daily  Dispense: 30 tablet; Refill: 3    5. Type 2 diabetes mellitus without complication, without long-term current use of insulin (H)  controlled  - blood glucose monitoring (NO BRAND SPECIFIED) meter device kit; Use to test blood sugar 1 times daily or as directed. Preferred blood glucose meter OR supplies to accompany: Blood Glucose Monitor Brands: per insurance.  Dispense: 1 kit; Refill: 0  - blood glucose monitoring (NO BRAND SPECIFIED) test strip; Use to test blood sugar 1 times daily or as directed. To accompany: Blood Glucose Monitor Brands: per insurance.  Dispense: 100 strip; Refill: 6  - blood glucose calibration (NO BRAND SPECIFIED) solution; To accompany: Blood Glucose Monitor Brands: per insurance.  Dispense: 1 Bottle; Refill: 3  - thin (NO BRAND SPECIFIED) lancets; Use with lanceting device. To accompany: Blood Glucose Monitor Brands: per insurance.  Dispense: 100 each; Refill: 6  - alcohol swab prep pads; Use to swab area of injection/sydni as directed.  Dispense: 100 each; Refill: 3  Follow up 3-6 monthsKeke Randle MD  [facility]

## 2018-08-09 NOTE — MR AVS SNAPSHOT
After Visit Summary   8/9/2018    Wenceslao Hutson    MRN: [id number]           Patient Information     Date Of Birth          1980        Visit Information        Provider Department      8/9/2018 2:20 PM Keke Randle MD [facility]        Today's Diagnoses     Essential hypertension    -  1    Class 1 obesity due to excess calories with serious comorbidity and body mass index (BMI) of 34.0 to 34.9 in adult        Hyperlipidemia associated with type 2 diabetes mellitus (H)        Hyperlipidemia LDL goal <100        Type 2 diabetes mellitus without complication, without long-term current use of insulin (H)          Care Instructions    [facility]    If you have any questions regarding to your visit please contact your care team:       Team Red:   Clinic Hours Telephone Number   Dr. Michelle Quinteros, NP   7am-7pm  Monday - Thursday   7am-5pm  Fridays  [phone]  (Appointment scheduling available 24/7)    Questions about your recent visit?   Team Line  [phone]   Ur[facility] [place] [facility] - 11am-9pm Monday-Friday Saturday-Sunday- 9am-5pm   [place] - 5pm-9pm Monday-Friday Saturday-Sunday- 9am-5pm  [phone] - Janis Renteria  [phone] - [place]       What options do I have for a visit other than an office visit? We offer electronic visits (e-visits) and telephone visits, when medically appropriate.  Please check with your medical insurance to see if these types of visits are covered, as you will be responsible for any charges that are not paid by your insurance.      You can use [employer] (secure electronic communication) to access to your chart, send your primary care provider a message, or make an appointment. Ask a team member how to get started.     For a price quote for your services, please call our Consumer Price Line at [phone] or our Imaging Cost estimation line at  "[phone] (for imaging tests).    Corinne Gardiner [facility]             Follow-ups after your visit        Future tests that were ordered for you today     Open Future Orders        Priority Expected Expires Ordered    Basic metabolic panel Routine  9/8/2018 8/9/2018            Who to contact     If you have questions or need follow up information about today's clinic visit or your schedule please contact [facility] directly at [phone].  Normal or non-critical lab and imaging results will be communicated to you by Varoliihart, letter or phone within 4 business days after the clinic has received the results. If you do not hear from us within 7 days, please contact the clinic through Poxel or phone. If you have a critical or abnormal lab result, we will notify you by phone as soon as possible.  Submit refill requests through Poxel or call your pharmacy and they will forward the refill request to us. Please allow 3 business days for your refill to be completed.          Additional Information About Your Visit        VaroliiharPontis Information     Poxel gives you secure access to your electronic health record. If you see a primary care provider, you can also send messages to your care team and make appointments. If you have questions, please call your primary care clinic.  If you do not have a primary care provider, please call [phone] and they will assist you.        Care EveryWhere ID     This is your Care EveryWhere ID. This could be used by other organizations to access your [place] medical records  [id number]        Your Vitals Were     Pulse Temperature Respirations Height Pulse Oximetry BMI (Body Mass Index)    102 97.8  F (36.6  C) (Oral) 16 5' 4.5\" (1.638 m) 97% 32.45 kg/m2       Blood Pressure from Last 3 Encounters:   08/09/18 130/80   04/19/18 124/86   03/21/14 120/82    Weight from Last 3 Encounters:   08/09/18 192 lb (87.1 kg)   04/19/18 205 lb 12.8 oz (93.4 kg)   03/21/14 204 lb 3.2 oz " (92.6 kg)              We Performed the Following     Basic metabolic panel     Hemoglobin A1c     Lipid panel reflex to direct LDL Fasting          Today's Medication Changes          These changes are accurate as of 8/9/18  3:02 PM.  If you have any questions, ask your nurse or doctor.               Start taking these medicines.        Dose/Directions    alcohol swab prep pads   Used for:  Type 2 diabetes mellitus without complication, without long-term current use of insulin (H)   Started by:  Keke Randle MD        Use to swab area of injection/sydni as directed.   Quantity:  100 each   Refills:  3       blood glucose calibration solution   Commonly known as:  NO BRAND SPECIFIED   Used for:  Type 2 diabetes mellitus without complication, without long-term current use of insulin (H)   Started by:  Keke Randle MD        To accompany: Blood Glucose Monitor Brands: per insurance.   Quantity:  1 Bottle   Refills:  3       blood glucose monitoring meter device kit   Commonly known as:  no brand specified   Used for:  Type 2 diabetes mellitus without complication, without long-term current use of insulin (H)   Started by:  Keke Randle MD        Use to test blood sugar 1 times daily or as directed. Preferred blood glucose meter OR supplies to accompany: Blood Glucose Monitor Brands: per insurance.   Quantity:  1 kit   Refills:  0       thin lancets   Commonly known as:  NO BRAND SPECIFIED   Used for:  Type 2 diabetes mellitus without complication, without long-term current use of insulin (H)   Started by:  Keke Randle MD        Use with lanceting device. To accompany: Blood Glucose Monitor Brands: per insurance.   Quantity:  100 each   Refills:  6         These medicines have changed or have updated prescriptions.        Dose/Directions    * blood glucose monitoring test strip   Commonly known as:  no brand specified   This may have changed:  Another medication with the same name was added. Make sure you  understand how and when to take each.   Used for:  Uncontrolled diabetes mellitus type 2 without complications, unspecified long term insulin use status (H)   Changed by:  Keke Randle MD        Use to test blood sugars 4 times daily or as directed   Quantity:  400 strip   Refills:  3       * blood glucose monitoring test strip   Commonly known as:  no brand specified   This may have changed:  You were already taking a medication with the same name, and this prescription was added. Make sure you understand how and when to take each.   Used for:  Type 2 diabetes mellitus without complication, without long-term current use of insulin (H)   Changed by:  Keke Randle MD        Use to test blood sugar 1 times daily or as directed. To accompany: Blood Glucose Monitor Brands: per insurance.   Quantity:  100 strip   Refills:  6       * Notice:  This list has 2 medication(s) that are the same as other medications prescribed for you. Read the directions carefully, and ask your doctor or other care provider to review them with you.         Where to get your medicines      These medications were sent to [facility] [address] [place] [address] AT [address] [address]     Phone:  [phone]     alcohol swab prep pads    amLODIPine 5 MG tablet    blood glucose calibration solution    blood glucose monitoring test strip    metFORMIN 1000 MG tablet    rosuvastatin 10 MG tablet    thin lancets         Some of these will need a paper prescription and others can be bought over the counter.  Ask your nurse if you have questions.     Bring a paper prescription for each of these medications     blood glucose monitoring meter device kit                Primary Care Provider Office Phone # Fax #    Keke Randle [phone] [address] [facility] [address]        Equal Access to Services     CRISTIANO BHANDARI AH: Airam Esteban  wamechelleda janetbebeto, qaybta karosaura hood, arik vazquezjunior ah. So [facility] [phone].    ATENCIÓN: Si sebas benavides, tiene a donato disposición servicios gratuitos de asistencia lingüística. Vicky al [phone].    We comply with applicable federal civil rights laws and [place] laws. We do not discriminate on the basis of race, color, national origin, age, disability, sex, sexual orientation, or gender identity.            Thank you!     Thank you for choosing [facility]  for your care. Our goal is always to provide you with excellent care. Hearing back from our patients is one way we can continue to improve our services. Please take a few minutes to complete the written survey that you may receive in the mail after your visit with us. Thank you!             Your Updated Medication List - Protect others around you: Learn how to safely use, store and throw away your medicines at www.disposemymeds.org.          This list is accurate as of 8/9/18  3:02 PM.  Always use your most recent med list.                   Brand Name Dispense Instructions for use Diagnosis    ACE/ARB/ARNI NOT PRESCRIBED (INTENTIONAL)      Please choose reason not prescribed,as he is allergic    Uncontrolled diabetes mellitus type 2 without complications, unspecified long term insulin use status (H)       alcohol swab prep pads     100 each    Use to swab area of injection/sydni as directed.    Type 2 diabetes mellitus without complication, without long-term current use of insulin (H)       amLODIPine 5 MG tablet    NORVASC    90 tablet    Take 1 tablet (5 mg) by mouth daily    Essential hypertension       bacitracin ophthalmic ointment     3.5 g    Apply 1/2 inch to right eye every 4 hours for 7 days.    Chalazions       blood glucose calibration solution    NO BRAND SPECIFIED    1 Bottle    To accompany: Blood Glucose Monitor Brands: per insurance.    Type 2 diabetes mellitus without complication, without  long-term current use of insulin (H)       blood glucose monitoring meter device kit    no brand specified    1 kit    Use to test blood sugar 1 times daily or as directed. Preferred blood glucose meter OR supplies to accompany: Blood Glucose Monitor Brands: per insurance.    Type 2 diabetes mellitus without complication, without long-term current use of insulin (H)       * blood glucose monitoring test strip    no brand specified    400 strip    Use to test blood sugars 4 times daily or as directed    Uncontrolled diabetes mellitus type 2 without complications, unspecified long term insulin use status (H)       * blood glucose monitoring test strip    no brand specified    100 strip    Use to test blood sugar 1 times daily or as directed. To accompany: Blood Glucose Monitor Brands: per insurance.    Type 2 diabetes mellitus without complication, without long-term current use of insulin (H)       EPINEPHrine 0.3 MG/0.3ML injection 2-pack    EPIPEN/ADRENACLICK/or ANY BX GENERIC EQUIV     Inject 0.3 mg into the muscle        insulin aspart 100 UNIT/ML injection    NovoLOG PEN    9 mL    Inject 10 Units Subcutaneous 2 times daily    Uncontrolled diabetes mellitus type 2 without complications, unspecified long term insulin use status (H)       insulin glargine 100 UNIT/ML injection    LANTUS SOLOSTAR    9 mL    Inject 15 Units Subcutaneous At Bedtime    Uncontrolled diabetes mellitus type 2 without complications, unspecified long term insulin use status (H)       metFORMIN 1000 MG tablet    GLUCOPHAGE    60 tablet    Take 1 tablet (1,000 mg) by mouth 2 times daily (with meals)        rosuvastatin 10 MG tablet    CRESTOR    30 tablet    Take 1 tablet (10 mg) by mouth daily    Hyperlipidemia LDL goal <100       thin lancets    NO BRAND SPECIFIED    100 each    Use with lanceting device. To accompany: Blood Glucose Monitor Brands: per insurance.    Type 2 diabetes mellitus without complication, without long-term current  use of insulin (H)       * Notice:  This list has 2 medication(s) that are the same as other medications prescribed for you. Read the directions carefully, and ask your doctor or other care provider to review them with you.

## 2018-08-09 NOTE — PATIENT INSTRUCTIONS
[facility]    If you have any questions regarding to your visit please contact your care team:       Team Red:   Clinic Hours Telephone Number   Dr. Michelle Quinteros, NP   7am-7pm  Monday - Thursday   7am-5pm  Fridays  [phone]  (Appointment scheduling available 24/7)    Questions about your recent visit?   Team Line  [phone]   Urgent Care - [place] and [facility] - 11am-9pm Monday-Friday Saturday-Sunday- 9am-5pm   [place] - 5pm-9pm Monday-Friday Saturday-Sunday- 9am-5pm  [phone] - [place]  [phone] - [place]       What options do I have for a visit other than an office visit? We offer electronic visits (e-visits) and telephone visits, when medically appropriate.  Please check with your medical insurance to see if these types of visits are covered, as you will be responsible for any charges that are not paid by your insurance.      You can use [employer] (secure electronic communication) to access to your chart, send your primary care provider a message, or make an appointment. Ask a team member how to get started.     For a price quote for your services, please call our Consumer Price Line at [phone] or our Imaging Cost estimation line at [phone] (for imaging tests).    Corinne Gardiner CMA

## 2018-08-10 LAB
ANION GAP SERPL CALCULATED.3IONS-SCNC: 10 MMOL/L (ref 3–14)
BUN SERPL-MCNC: 20 MG/DL (ref 7–30)
CALCIUM SERPL-MCNC: 9.3 MG/DL (ref 8.5–10.1)
CHLORIDE SERPL-SCNC: 105 MMOL/L (ref 94–109)
CHOLEST SERPL-MCNC: 134 MG/DL
CO2 SERPL-SCNC: 25 MMOL/L (ref 20–32)
CREAT SERPL-MCNC: 1.19 MG/DL (ref 0.66–1.25)
GFR SERPL CREATININE-BSD FRML MDRD: 68 ML/MIN/1.7M2
GLUCOSE SERPL-MCNC: 75 MG/DL (ref 70–99)
HDLC SERPL-MCNC: 42 MG/DL
LDLC SERPL CALC-MCNC: 70 MG/DL
NONHDLC SERPL-MCNC: 92 MG/DL
POTASSIUM SERPL-SCNC: 4.2 MMOL/L (ref 3.4–5.3)
SODIUM SERPL-SCNC: 140 MMOL/L (ref 133–144)
TRIGL SERPL-MCNC: 109 MG/DL

## 2018-09-06 ENCOUNTER — OFFICE VISIT (OUTPATIENT)
Dept: FAMILY MEDICINE | Facility: CLINIC | Age: 38
End: 2018-09-06
Payer: COMMERCIAL

## 2018-09-06 VITALS
HEART RATE: 91 BPM | TEMPERATURE: 98.4 F | OXYGEN SATURATION: 97 % | SYSTOLIC BLOOD PRESSURE: 110 MMHG | HEIGHT: 65 IN | BODY MASS INDEX: 33.15 KG/M2 | RESPIRATION RATE: 18 BRPM | DIASTOLIC BLOOD PRESSURE: 70 MMHG | WEIGHT: 199 LBS

## 2018-09-06 DIAGNOSIS — J30.9 CHRONIC ALLERGIC RHINITIS, UNSPECIFIED SEASONALITY, UNSPECIFIED TRIGGER: ICD-10-CM

## 2018-09-06 DIAGNOSIS — M25.512 CHRONIC LEFT SHOULDER PAIN: Primary | ICD-10-CM

## 2018-09-06 DIAGNOSIS — G89.29 CHRONIC LEFT SHOULDER PAIN: Primary | ICD-10-CM

## 2018-09-06 PROCEDURE — 99214 OFFICE O/P EST MOD 30 MIN: CPT | Performed by: FAMILY MEDICINE

## 2018-09-06 RX ORDER — CETIRIZINE HYDROCHLORIDE 10 MG/1
10 TABLET ORAL EVERY EVENING
Qty: 90 TABLET | Refills: 0 | Status: SHIPPED | OUTPATIENT
Start: 2018-09-06 | End: 2018-11-30

## 2018-09-06 NOTE — MR AVS SNAPSHOT
After Visit Summary   9/6/2018    Wenceslao Hutson    MRN: [id number]           Patient Information     Date Of Birth          1980        Visit Information        Provider Department      9/6/2018 2:40 PM Keke Randle MD [facility] [place]        Today's Diagnoses     Chronic left shoulder pain    -  1       Follow-ups after your visit        Additional Services     ORTHOPEDICS ADULT REFERRAL       Your provider has referred you to: G: [facility] [phone]    [email or web address]    Please be aware that coverage of these services is subject to the terms and limitations of your health insurance plan.  Call member services at your health plan with any benefit or coverage questions.      Please bring the following to your appointment:    >>   Any x-rays, CTs or MRIs which have been performed.  Contact the facility where they were done to arrange for  prior to your scheduled appointment.  Any new CT, MRI or other procedures ordered by your specialist must be performed at a [place] facility or coordinated by your clinic's referral office.    >>   List of current medications   >>   This referral request   >>   Any documents/labs given to you for this referral                  Who to contact     If you have questions or need follow up information about today's clinic visit or your schedule please contact [facility] directly at [phone].  Normal or non-critical lab and imaging results will be communicated to you by MyChart, letter or phone within 4 business days after the clinic has received the results. If you do not hear from us within 7 days, please contact the clinic through MyChart or phone. If you have a critical or abnormal lab result, we will notify you by phone as soon as possible.  Submit refill requests through [employer] or call your pharmacy and they will forward the refill request to us. Please allow 3 business  "days for your refill to be completed.          Additional Information About Your Visit        MyChart Information     [employer] gives you secure access to your electronic health record. If you see a primary care provider, you can also send messages to your care team and make appointments. If you have questions, please call your primary care clinic.  If you do not have a primary care provider, please call [phone] and they will assist you.        Care EveryWhere ID     This is your Care EveryWhere ID. This could be used by other organizations to access your [place] medical records  [id number]        Your Vitals Were     Pulse Temperature Respirations Height Pulse Oximetry BMI (Body Mass Index)    91 98.4  F (36.9  C) (Oral) 18 5' 4.5\" (1.638 m) 97% 33.63 kg/m2       Blood Pressure from Last 3 Encounters:   09/06/18 110/70   08/09/18 130/80   04/19/18 124/86    Weight from Last 3 Encounters:   09/06/18 199 lb (90.3 kg)   08/09/18 192 lb (87.1 kg)   04/19/18 205 lb 12.8 oz (93.4 kg)              We Performed the Following     ORTHOPEDICS ADULT REFERRAL        Primary Care Provider Office Phone # Fax #    Keke Randle [phone]       [address] [facility] [address]        Equal Access to Services     [facility] ELISABET : Hadii aad ku hadasho Soomaali, waaxda luqadaha, qaybta kaalmada adeegyada, arik knox. So [facility] [phone].    ATENCIÓN: Si habla español, tiene a donato disposición servicios gratuitos de asistencia lingüística. Llame al [phone].    We comply with applicable federal civil rights laws and [place] laws. We do not discriminate on the basis of race, color, national origin, age, disability, sex, sexual orientation, or gender identity.            Thank you!     Thank you for choosing [facility]  for your care. Our goal is always to provide you with excellent care. Hearing back from our patients is one way we can continue to improve " our services. Please take a few minutes to complete the written survey that you may receive in the mail after your visit with us. Thank you!             Your Updated Medication List - Protect others around you: Learn how to safely use, store and throw away your medicines at www.disposemymeds.org.          This list is accurate as of 9/6/18  3:11 PM.  Always use your most recent med list.                   Brand Name Dispense Instructions for use Diagnosis    ACE/ARB/ARNI NOT PRESCRIBED (INTENTIONAL)      Please choose reason not prescribed,as he is allergic    Uncontrolled diabetes mellitus type 2 without complications, unspecified long term insulin use status (H)       alcohol swab prep pads     100 each    Use to swab area of injection/sydni as directed.    Type 2 diabetes mellitus without complication, without long-term current use of insulin (H)       amLODIPine 5 MG tablet    NORVASC    90 tablet    Take 1 tablet (5 mg) by mouth daily    Essential hypertension       bacitracin ophthalmic ointment     3.5 g    Apply 1/2 inch to right eye every 4 hours for 7 days.    Chalazions       blood glucose calibration solution    NO BRAND SPECIFIED    1 Bottle    To accompany: Blood Glucose Monitor Brands: per insurance.    Type 2 diabetes mellitus without complication, without long-term current use of insulin (H)       blood glucose monitoring meter device kit    no brand specified    1 kit    Use to test blood sugar 1 times daily or as directed. Preferred blood glucose meter OR supplies to accompany: Blood Glucose Monitor Brands: per insurance.    Type 2 diabetes mellitus without complication, without long-term current use of insulin (H)       * blood glucose monitoring test strip    no brand specified    400 strip    Use to test blood sugars 4 times daily or as directed    Uncontrolled diabetes mellitus type 2 without complications, unspecified long term insulin use status (H)       * blood glucose monitoring test  strip    no brand specified    100 strip    Use to test blood sugar 1 times daily or as directed. To accompany: Blood Glucose Monitor Brands: per insurance.    Type 2 diabetes mellitus without complication, without long-term current use of insulin (H)       EPINEPHrine 0.3 MG/0.3ML injection 2-pack    EPIPEN/ADRENACLICK/or ANY BX GENERIC EQUIV     Inject 0.3 mg into the muscle        insulin aspart 100 UNIT/ML injection    NovoLOG PEN    9 mL    Inject 10 Units Subcutaneous 2 times daily    Uncontrolled diabetes mellitus type 2 without complications, unspecified long term insulin use status (H)       insulin glargine 100 UNIT/ML injection    LANTUS SOLOSTAR    9 mL    Inject 15 Units Subcutaneous At Bedtime    Uncontrolled diabetes mellitus type 2 without complications, unspecified long term insulin use status (H)       metFORMIN 1000 MG tablet    GLUCOPHAGE    60 tablet    Take 1 tablet (1,000 mg) by mouth 2 times daily (with meals)    Type 2 diabetes mellitus without complication, without long-term current use of insulin (H)       rosuvastatin 10 MG tablet    CRESTOR    30 tablet    Take 1 tablet (10 mg) by mouth daily    Hyperlipidemia associated with type 2 diabetes mellitus (H)       thin lancets    NO BRAND SPECIFIED    100 each    Use with lanceting device. To accompany: Blood Glucose Monitor Brands: per insurance.    Type 2 diabetes mellitus without complication, without long-term current use of insulin (H)       * Notice:  This list has 2 medication(s) that are the same as other medications prescribed for you. Read the directions carefully, and ask your doctor or other care provider to review them with you.

## 2018-09-06 NOTE — PROGRESS NOTES
SUBJECTIVE:   Wenceslao Hutson is a 38 year old male who presents to clinic today for the following health issues:      Patient presents with:  RECHECK: Left shoulder, Tonsils, had left shoulder surgery in 2007 and left should pain is getting worse unable to carry heavy things, had Tonsillectomy in 12/22/2011.        Pt was Lifting weight in 2002 and Left shoulder surgery 2007.  Pt  Has had pain Left shoulder 3 months after surgery as continued to Lift  He says he yesenia not follow  His Physician orders and dii Lifting shortly after surgery and felt something Rip.  He works in construction.  His Left shoulder pain is getting worse  His has  Time off and wants to schedule his shoulder surgery.  Patient states he is had mouth odor many  years  He had  had a tonsillectomy many years ago.  He does have chronic allergies, which are more during the fall season.  He does have a postnasal  discharge.  He denies any GERD symptoms.          Problem list and histories reviewed & adjusted, as indicated.  Additional history: as documented    Patient Active Problem List   Diagnosis     CARDIOVASCULAR SCREENING; LDL GOAL LESS THAN 160     Hyperlipidemia associated with type 2 diabetes mellitus (H)     Type 2 diabetes mellitus without complication, with long-term current use of insulin (H)     Hyperlipidemia LDL goal <100     Essential hypertension     Uncontrolled diabetes mellitus type 2 without complications, unspecified long term insulin use status (H)     Class 1 obesity due to excess calories with serious comorbidity and body mass index (BMI) of 34.0 to 34.9 in adult     Past Surgical History:   Procedure Laterality Date     SHOULDER SURGERY  2007-Left     TONSILLECTOMY  12/22/2011    Procedure:TONSILLECTOMY; Tonsillectomy; Surgeon:NAA CASIANO; Location: OR       Social History   Substance Use Topics     Smoking status: Former Smoker     Years: 3.00     Types: Cigarettes     Start date: 6/15/2005     Smokeless  tobacco: Former User     Quit date: 12/22/2011      Comment: smoke free household.     Alcohol use Yes      Comment: rare     Family History   Problem Relation Age of Onset     Breast Cancer Mother          Current Outpatient Prescriptions   Medication Sig Dispense Refill     ACE/ARB/ARNI NOT PRESCRIBED, INTENTIONAL, Please choose reason not prescribed,as he is allergic       alcohol swab prep pads Use to swab area of injection/sydni as directed. 100 each 3     amLODIPine (NORVASC) 5 MG tablet Take 1 tablet (5 mg) by mouth daily 90 tablet 3     blood glucose calibration (NO BRAND SPECIFIED) solution To accompany: Blood Glucose Monitor Brands: per insurance. 1 Bottle 3     blood glucose monitoring (NO BRAND SPECIFIED) meter device kit Use to test blood sugar 1 times daily or as directed. Preferred blood glucose meter OR supplies to accompany: Blood Glucose Monitor Brands: per insurance. 1 kit 0     blood glucose monitoring (NO BRAND SPECIFIED) test strip Use to test blood sugar 1 times daily or as directed. To accompany: Blood Glucose Monitor Brands: per insurance. 100 strip 6     cetirizine (ZYRTEC) 10 MG tablet Take 1 tablet (10 mg) by mouth every evening 90 tablet 0     EPINEPHrine (EPIPEN/ADRENACLICK/OR ANY BX GENERIC EQUIV) 0.3 MG/0.3ML injection 2-pack Inject 0.3 mg into the muscle       metFORMIN (GLUCOPHAGE) 1000 MG tablet Take 1 tablet (1,000 mg) by mouth 2 times daily (with meals) 60 tablet 3     rosuvastatin (CRESTOR) 10 MG tablet Take 1 tablet (10 mg) by mouth daily 30 tablet 3     thin (NO BRAND SPECIFIED) lancets Use with lanceting device. To accompany: Blood Glucose Monitor Brands: per insurance. 100 each 6     bacitracin ophthalmic ointment Apply 1/2 inch to right eye every 4 hours for 7 days. (Patient not taking: Reported on 4/19/2018) 3.5 g 0     blood glucose monitoring (NO BRAND SPECIFIED) test strip Use to test blood sugars 4 times daily or as directed (Patient not taking: Reported on 8/9/2018)  400 strip 3     insulin aspart (NOVOLOG PEN) 100 UNIT/ML injection Inject 10 Units Subcutaneous 2 times daily (Patient not taking: Reported on 8/9/2018) 9 mL 0     insulin glargine (LANTUS SOLOSTAR) 100 UNIT/ML injection Inject 15 Units Subcutaneous At Bedtime (Patient not taking: Reported on 8/9/2018) 9 mL 0     Allergies   Allergen Reactions     Lisinopril Swelling     probable     Atorvastatin      Recent Labs   Lab Test  08/09/18   1420  04/19/18   0804  04/19/18   0800 02/22/18 10/17/17  03/21/14   0925   02/24/12   1052   01/06/12   0935   A1C  5.6  11.9*   --   7.4*   --   11.5*   < >   --    --    --    LDL  70   --   Cannot estimate LDL when triglyceride exceeds 400 mg/dL  127*   --   105  Cannot estimate LDL when triglyceride exceeds 400 mg/dL  66   < >   --    --    --    HDL  42   --   38*   --   35*  30*   < >   --    --    --    TRIG  109   --   700*   --   311*  640*   < >   --    --    --    ALT   --    --   42   --    --    --    --   79*   --   167*   CR  1.19   --   0.86   --   0.91  0.87   --   0.97   < >   --    GFRESTIMATED  68   --   >90   --   >60  >90   --   >90   < >   --    GFRESTBLACK  83   --   >90   --   >60  >90   --   >90   < >   --    POTASSIUM  4.2   --   4.4   --   3.9  4.1   --   4.4   < >   --    TSH   --    --    --   0.44   --   0.96   --    --    --    --     < > = values in this interval not displayed.      BP Readings from Last 3 Encounters:   09/06/18 110/70   08/09/18 130/80   04/19/18 124/86    Wt Readings from Last 3 Encounters:   09/06/18 199 lb (90.3 kg)   08/09/18 192 lb (87.1 kg)   04/19/18 205 lb 12.8 oz (93.4 kg)                  Labs reviewed in EPIC    Reviewed and updated as needed this visit by clinical staff  Tobacco  Allergies  Meds  Med Hx  Surg Hx  Fam Hx  Soc Hx      Reviewed and updated as needed this visit by Provider         ROS:  CONSTITUTIONAL: NEGATIVE for fever, chills, change in weight  ENT/MOUTH:  As above  RESP: NEGATIVE for significant  "cough or SOB  CV: NEGATIVE for chest pain, palpitations or peripheral edema  GI: NEGATIVE for nausea, abdominal pain, heartburn, or change in bowel habits  MUSCULOSKELETAL   As above  ROS otherwise negative    OBJECTIVE:     /70  Pulse 91  Temp 98.4  F (36.9  C) (Oral)  Resp 18  Ht 5' 4.5\" (1.638 m)  Wt 199 lb (90.3 kg)  SpO2 97%  BMI 33.63 kg/m2  Body mass index is 33.63 kg/(m^2).  GENERAL: healthy, alert and no distress  HENT: ear canals and TM's normal, nose congestion  and mouth without ulcers or lesions  NECK: no adenopathy, no asymmetry, masses, or scars and thyroid normal to palpation  RESP: lungs clear to auscultation - no rales, rhonchi or wheezes  CV: regular rate and rhythm, normal S1 S2, no S3 or S4, no murmur, click or rub, no peripheral edema and peripheral pulses strong  ABDOMEN: soft, nontender, no hepatosplenomegaly, no masses and bowel sounds normal  MS: Left shoulder  -pain with external Rotation  No neck tenderness   Is able to move shoulder but has pain    Diagnostic Test Results:  none     ASSESSMENT/PLAN:       1. Chronic left shoulder pain  Discussed PT-pt declined  Discussed Imaging  Pt wants referral to ortho    2. Chronic allergic rhinitis, unspecified seasonality, unspecified trigger  Advised see Dentist    - cetirizine (ZYRTEC) 10 MG tablet; Take 1 tablet (10 mg) by mouth every evening  Dispense: 90 tablet; Refill: 0  Follow up if not better  Keke Randle MD  [facility]  "

## 2018-09-11 ENCOUNTER — OFFICE VISIT (OUTPATIENT)
Dept: ORTHOPEDICS | Facility: CLINIC | Age: 38
End: 2018-09-11
Payer: COMMERCIAL

## 2018-09-11 ENCOUNTER — RADIANT APPOINTMENT (OUTPATIENT)
Dept: GENERAL RADIOLOGY | Facility: CLINIC | Age: 38
End: 2018-09-11
Attending: ORTHOPAEDIC SURGERY
Payer: COMMERCIAL

## 2018-09-11 VITALS — DIASTOLIC BLOOD PRESSURE: 82 MMHG | OXYGEN SATURATION: 97 % | SYSTOLIC BLOOD PRESSURE: 127 MMHG | HEART RATE: 100 BPM

## 2018-09-11 DIAGNOSIS — M25.519 SHOULDER PAIN: ICD-10-CM

## 2018-09-11 DIAGNOSIS — S29.011S PECTORALIS MUSCLE RUPTURE, SEQUELA: Primary | ICD-10-CM

## 2018-09-11 PROCEDURE — 99244 OFF/OP CNSLTJ NEW/EST MOD 40: CPT | Performed by: ORTHOPAEDIC SURGERY

## 2018-09-11 PROCEDURE — 73030 X-RAY EXAM OF SHOULDER: CPT | Mod: LT

## 2018-09-11 NOTE — MR AVS SNAPSHOT
After Visit Summary   9/11/2018    Wenceslao Hutson    MRN: [id number]           Patient Information     Date Of Birth          1980        Visit Information        Provider Department      9/11/2018 4:00 PM Sven Marquez MD [facility]        Today's Diagnoses     Pectoralis muscle rupture, sequela    -  1       Follow-ups after your visit        Future tests that were ordered for you today     Open Future Orders        Priority Expected Expires Ordered    MR Shoulder Left w/o Contrast Routine 9/11/2018 9/11/2019 9/11/2018            Who to contact     If you have questions or need follow up information about today's clinic visit or your schedule please contact [facility] directly at [phone].  Normal or non-critical lab and imaging results will be communicated to you by MyChart, letter or phone within 4 business days after the clinic has received the results. If you do not hear from us within 7 days, please contact the clinic through [employer] or phone. If you have a critical or abnormal lab result, we will notify you by phone as soon as possible.  Submit refill requests through [employer] or call your pharmacy and they will forward the refill request to us. Please allow 3 business days for your refill to be completed.          Additional Information About Your Visit        MyChart Information     [employer] gives you secure access to your electronic health record. If you see a primary care provider, you can also send messages to your care team and make appointments. If you have questions, please call your primary care clinic.  If you do not have a primary care provider, please call [phone] and they will assist you.        Care EveryWhere ID     This is your Care EveryWhere ID. This could be used by other organizations to access your [place] medical records  [id number]        Your Vitals Were     Pulse Pulse Oximetry                100 97%           Blood  Pressure from Last 3 Encounters:   09/11/18 127/82   09/06/18 110/70   08/09/18 130/80    Weight from Last 3 Encounters:   09/06/18 90.3 kg (199 lb)   08/09/18 87.1 kg (192 lb)   04/19/18 93.4 kg (205 lb 12.8 oz)                 Today's Medication Changes          These changes are accurate as of 9/11/18  5:22 PM.  If you have any questions, ask your nurse or doctor.               Stop taking these medicines if you haven't already. Please contact your care team if you have questions.     bacitracin ophthalmic ointment   Stopped by:  Sven Marquez MD           insulin aspart 100 UNIT/ML injection   Commonly known as:  NovoLOG PEN   Stopped by:  Sven Marquez MD           insulin glargine 100 UNIT/ML injection   Commonly known as:  LANTUS SOLOSTAR   Stopped by:  Sven Marquez MD                    Primary Care Provider Office Phone # Fax #    Keke Randle [phone] [address] [facility] [address]        Equal Access to Services     [facility] AH: Hadii aad ku hadasho Soomaali, waaxda luqadaha, qaybta kaalmada adeegyada, arik hartmann . So [facility] [phone].    ATENCIÓN: Si habla español, tiene a donato disposición servicios gratuitos de asistencia lingüística. [facility] [phone].    We comply with applicable federal civil rights laws and [place] laws. We do not discriminate on the basis of race, color, national origin, age, disability, sex, sexual orientation, or gender identity.            Thank you!     Thank you for choosing [facility]  for your care. Our goal is always to provide you with excellent care. Hearing back from our patients is one way we can continue to improve our services. Please take a few minutes to complete the written survey that you may receive in the mail after your visit with us. Thank you!             Your Updated Medication List - Protect others around you: Learn how to safely use, store and  throw away your medicines at www.disposemymeds.org.          This list is accurate as of 9/11/18  5:22 PM.  Always use your most recent med list.                   Brand Name Dispense Instructions for use Diagnosis    ACE/ARB/ARNI NOT PRESCRIBED (INTENTIONAL)      Please choose reason not prescribed,as he is allergic    Uncontrolled diabetes mellitus type 2 without complications, unspecified long term insulin use status (H)       alcohol swab prep pads     100 each    Use to swab area of injection/sydni as directed.    Type 2 diabetes mellitus without complication, without long-term current use of insulin (H)       amLODIPine 5 MG tablet    NORVASC    90 tablet    Take 1 tablet (5 mg) by mouth daily    Essential hypertension       blood glucose calibration solution    NO BRAND SPECIFIED    1 Bottle    To accompany: Blood Glucose Monitor Brands: per insurance.    Type 2 diabetes mellitus without complication, without long-term current use of insulin (H)       blood glucose monitoring meter device kit    no brand specified    1 kit    Use to test blood sugar 1 times daily or as directed. Preferred blood glucose meter OR supplies to accompany: Blood Glucose Monitor Brands: per insurance.    Type 2 diabetes mellitus without complication, without long-term current use of insulin (H)       * blood glucose monitoring test strip    no brand specified    400 strip    Use to test blood sugars 4 times daily or as directed    Uncontrolled diabetes mellitus type 2 without complications, unspecified long term insulin use status (H)       * blood glucose monitoring test strip    no brand specified    100 strip    Use to test blood sugar 1 times daily or as directed. To accompany: Blood Glucose Monitor Brands: per insurance.    Type 2 diabetes mellitus without complication, without long-term current use of insulin (H)       cetirizine 10 MG tablet    zyrTEC    90 tablet    Take 1 tablet (10 mg) by mouth every evening    Chronic  left shoulder pain, Chronic allergic rhinitis, unspecified seasonality, unspecified trigger       EPINEPHrine 0.3 MG/0.3ML injection 2-pack    EPIPEN/ADRENACLICK/or ANY BX GENERIC EQUIV     Inject 0.3 mg into the muscle        metFORMIN 1000 MG tablet    GLUCOPHAGE    60 tablet    Take 1 tablet (1,000 mg) by mouth 2 times daily (with meals)    Type 2 diabetes mellitus without complication, without long-term current use of insulin (H)       rosuvastatin 10 MG tablet    CRESTOR    30 tablet    Take 1 tablet (10 mg) by mouth daily    Hyperlipidemia associated with type 2 diabetes mellitus (H)       thin lancets    NO BRAND SPECIFIED    100 each    Use with lanceting device. To accompany: Blood Glucose Monitor Brands: per insurance.    Type 2 diabetes mellitus without complication, without long-term current use of insulin (H)       * Notice:  This list has 2 medication(s) that are the same as other medications prescribed for you. Read the directions carefully, and ask your doctor or other care provider to review them with you.

## 2018-09-11 NOTE — PROGRESS NOTES
SUBJECTIVE:  Wenceslao Hutson is a 38 year old male who is seen in consultation at the request of Dr. Keke Randle for left shoulder pain that has been present for many years. The patient initially injured his shoulder lifting weights on 11/20/11 and had surgery 4 months later by Dr. Steiner on 04/03/12. The patient followed up 2 weeks later but didn't return after that. The patient reports that he continued lifting shortly after the procedure, and went right back to work, against the physician's instructions and felt something rip early-on. Denies numbness and tingling. He has a new pain in his neck as of 5 months ago.     Present symptoms: pain lifting, weakness with lifting, unable to do pushups and pain behind his back.  Associated symptoms: cervical ROM causes his left arm pains.    Ortho PMH: History of pectoralis major rupture repair by Dr. Steiner April 2012    Review of Systems:  Constitutional:  NEGATIVE for fever, chills, change in weight  Integumentary/Skin:  NEGATIVE for worrisome rashes, moles or lesions  Eyes:  NEGATIVE for vision changes or irritation  ENT/Mouth:  NEGATIVE for ear, mouth and throat problems  Resp:  NEGATIVE for significant cough or SOB  Breast:  NEGATIVE for masses, tenderness or discharge  CV:  NEGATIVE for chest pain, palpitations or peripheral edema  GI:  NEGATIVE for nausea, abdominal pain, heartburn, or change in bowel habits  :  Negative   Musculoskeletal:  See HPI above  Neuro:  NEGATIVE for weakness, dizziness or paresthesias  Endocrine:  NEGATIVE for temperature intolerance, skin/hair changes  Heme/allergy/immune:  NEGATIVE for bleeding problems  Psychiatric:  NEGATIVE for changes in mood or affect    Past Medical History:   Past Medical History:   Diagnosis Date     Chronic tonsillitis      Past Surgical History:   Past Surgical History:   Procedure Laterality Date     SHOULDER SURGERY  2007-Left     TONSILLECTOMY  12/22/2011    Procedure:TONSILLECTOMY; Tonsillectomy;  Surgeon:NAA CASIANO; Location:[facility] OR     Family History:   Family History   Problem Relation Age of Onset     Breast Cancer Mother      Social History:   Social History   Substance Use Topics     Smoking status: Former Smoker     Years: 3.00     Types: Cigarettes     Start date: 6/15/2005     Smokeless tobacco: Former User     Quit date: 12/22/2011      Comment: smoke free household.     Alcohol use Yes      Comment: rare     OBJECTIVE:  Physical Exam:  /82  Pulse 100  SpO2 97%  General Appearance: healthy, alert and no distress   Skin: no suspicious lesions or rashes.  Multi-tatooed, healed scratch marks all around upper back and neck.  Neuro: Normal strength and tone, mentation intact and speech normal  Vascular: good pulses, and cappillary refill   Lymph: no lymphadenopathy   Psych:  mentation appears normal and affect normal/bright  Resp: no increased work of breathing    Neck Exam:  Cervical range of motion: full and does not seem to reproduce shoulder pain.  Sensory deficits: none  Motor deficits: none  DTR's: normal    Left Shoulder Exam:  Shoulder Inspection: Appears to have a deformity of the left pectoralis; accentuated with prayer maneuver. No scapular winging. No  rotator cuff atrophy  Tender: diffuse throughout the shoulder and shoulder girdle  Range of Motion:   Active: forward flexion: 100 degrees, internal rotation: back pocket   Passive: forward flexion: 120* degrees, external rotation: 70 degrees   Strength: forward flexion: 4/5, external rotation: 4-/5, Belly Press: unable. He seemed to have similar rotator cuff weakness profile preoperatively in 2012.     X-rays:  Obtained today, 09/11/18, of the LEFT SHOULDER: 3-views, reviewed in the office with the patient by myself today and show very mild narrowing of the glenohumeral joint. There is some hypertrophic calcific changes on the lateral humeral shaft cortex where the original repair took place. Type II acromion. There are also  mild degenerative changes in the acromioclavicular joint. The humeral head is not elevated.     ASSESSMENT:  Recurrent chronic pectoralis major tear. He is exhibiting signs of a large rotator cuff tear as well.  His pain very diffuse and his weakness profile doesn't fit that of an isolated pec rupture.    PLAN:  MRI of the left shoulder ordered. The patient should follow up for these results. Because he works 12 hours a day, we will hold off on ordering physical therapy at this time. All questions were answered.     AVRIL Marquez MD  Dept. Orthopedic Surgery  [facility]    This document serves as a record of the services and decisions personally performed and made by Dr. AVRIL Marquez MD. It was created on his behalf by Brian Bowles, a trained medical scribe. The creation of this record is based on the provider's personal observations and the statements of the patient. This document has been checked and approved by the attending provider.   Brian Bowles September 11, 2018 4:51 PM

## 2018-09-11 NOTE — LETTER
9/11/2018         RE: Wenceslao Hutson  [address] [facility] [address]        Dear Colleague,    Thank you for referring your patient, Wenceslao Hutson, to the [facility]. Please see a copy of my visit note below.    SUBJECTIVE:  Wenceslao Hutson is a 38 year old male who is seen in consultation at the request of Dr. Keke Randle for left shoulder pain that has been present for many years. The patient initially injured his shoulder lifting weights on 11/20/11 and had surgery 4 months later by Dr. Steiner on 04/03/12. The patient followed up 2 weeks later but didn't return after that. The patient reports that he continued lifting shortly after the procedure, and went right back to work, against the physician's instructions and felt something rip early-on. Denies numbness and tingling. He has a new pain in his neck as of 5 months ago.     Present symptoms: pain lifting, weakness with lifting, unable to do pushups and pain behind his back.  Associated symptoms: cervical ROM causes his left arm pains.    Ortho PMH: History of pectoralis major rupture repair by Dr. Steiner April 2012    Review of Systems:  Constitutional:  NEGATIVE for fever, chills, change in weight  Integumentary/Skin:  NEGATIVE for worrisome rashes, moles or lesions  Eyes:  NEGATIVE for vision changes or irritation  ENT/Mouth:  NEGATIVE for ear, mouth and throat problems  Resp:  NEGATIVE for significant cough or SOB  Breast:  NEGATIVE for masses, tenderness or discharge  CV:  NEGATIVE for chest pain, palpitations or peripheral edema  GI:  NEGATIVE for nausea, abdominal pain, heartburn, or change in bowel habits  :  Negative   Musculoskeletal:  See HPI above  Neuro:  NEGATIVE for weakness, dizziness or paresthesias  Endocrine:  NEGATIVE for temperature intolerance, skin/hair changes  Heme/allergy/immune:  NEGATIVE for bleeding problems  Psychiatric:  NEGATIVE for changes in mood or affect    Past Medical History:   Past  Medical History:   Diagnosis Date     Chronic tonsillitis      Past Surgical History:   Past Surgical History:   Procedure Laterality Date     SHOULDER SURGERY  2007-Left     TONSILLECTOMY  12/22/2011    Procedure:TONSILLECTOMY; Tonsillectomy; Surgeon:NAA CASIANO; Location: OR     Family History:   Family History   Problem Relation Age of Onset     Breast Cancer Mother      Social History:   Social History   Substance Use Topics     Smoking status: Former Smoker     Years: 3.00     Types: Cigarettes     Start date: 6/15/2005     Smokeless tobacco: Former User     Quit date: 12/22/2011      Comment: smoke free household.     Alcohol use Yes      Comment: rare     OBJECTIVE:  Physical Exam:  /82  Pulse 100  SpO2 97%  General Appearance: healthy, alert and no distress   Skin: no suspicious lesions or rashes.  Multi-tatooed, healed scratch marks all around upper back and neck.  Neuro: Normal strength and tone, mentation intact and speech normal  Vascular: good pulses, and cappillary refill   Lymph: no lymphadenopathy   Psych:  mentation appears normal and affect normal/bright  Resp: no increased work of breathing    Neck Exam:  Cervical range of motion: full and does not seem to reproduce shoulder pain.  Sensory deficits: none  Motor deficits: none  DTR's: normal    Left Shoulder Exam:  Shoulder Inspection: Appears to have a deformity of the left pectoralis; accentuated with prayer maneuver. No scapular winging. No  rotator cuff atrophy  Tender: diffuse throughout the shoulder and shoulder girdle  Range of Motion:   Active: forward flexion: 100 degrees, internal rotation: back pocket   Passive: forward flexion: 120* degrees, external rotation: 70 degrees   Strength: forward flexion: 4/5, external rotation: 4-/5, Belly Press: unable. He seemed to have similar rotator cuff weakness profile preoperatively in 2012.     X-rays:  Obtained today, 09/11/18, of the LEFT SHOULDER: 3-views, reviewed in the  office with the patient by myself today and show very mild narrowing of the glenohumeral joint. There is some hypertrophic calcific changes on the lateral humeral shaft cortex where the original repair took place. Type II acromion. There are also mild degenerative changes in the acromioclavicular joint. The humeral head is not elevated.     ASSESSMENT:  Recurrent chronic pectoralis major tear. He is exhibiting signs of a large rotator cuff tear as well.  His pain very diffuse and his weakness profile doesn't fit that of an isolated pec rupture.    PLAN:  MRI of the left shoulder ordered. The patient should follow up for these results. Because he works 12 hours a day, we will hold off on ordering physical therapy at this time. All questions were answered.     AVRIL Marquez MD  Dept. Orthopedic Surgery  [facility]    This document serves as a record of the services and decisions personally performed and made by Dr. AVRIL Marquez MD. It was created on his behalf by Brian Bowles, a trained medical scribe. The creation of this record is based on the provider's personal observations and the statements of the patient. This document has been checked and approved by the attending provider.   Brian Bowles September 11, 2018 4:51 PM    Again, thank you for allowing me to participate in the care of your patient.        Sincerely,        Sven Marquez MD

## 2018-09-15 ENCOUNTER — RADIANT APPOINTMENT (OUTPATIENT)
Dept: MRI IMAGING | Facility: CLINIC | Age: 38
End: 2018-09-15
Attending: ORTHOPAEDIC SURGERY
Payer: COMMERCIAL

## 2018-09-15 DIAGNOSIS — S29.011S PECTORALIS MUSCLE RUPTURE, SEQUELA: ICD-10-CM

## 2018-09-15 PROCEDURE — 73221 MRI JOINT UPR EXTREM W/O DYE: CPT | Mod: TC

## 2018-10-09 ENCOUNTER — OFFICE VISIT (OUTPATIENT)
Dept: ORTHOPEDICS | Facility: CLINIC | Age: 38
End: 2018-10-09
Payer: COMMERCIAL

## 2018-10-09 VITALS
BODY MASS INDEX: 33.97 KG/M2 | SYSTOLIC BLOOD PRESSURE: 122 MMHG | DIASTOLIC BLOOD PRESSURE: 81 MMHG | OXYGEN SATURATION: 97 % | HEIGHT: 64 IN | HEART RATE: 88 BPM | WEIGHT: 199 LBS

## 2018-10-09 DIAGNOSIS — S29.011S: Primary | ICD-10-CM

## 2018-10-09 PROCEDURE — 99213 OFFICE O/P EST LOW 20 MIN: CPT | Performed by: ORTHOPAEDIC SURGERY

## 2018-10-09 NOTE — PROGRESS NOTES
"SUBJECTIVE:  Wenceslao Hutson returns for MRI results from 09/15/18 of the LEFT SHOULDER, which are reviewed with the patient by myself and showed:  1. No rotator cuff pathology.  2. Mild hypertrophic degenerative change at the acromioclavicular joint. Trace amount of fluid/edema related to the subacromial/subdeltoid bursa could represent bursitis.  3. Abnormal-appearing anterior inferior labrum, similar to the prior exam. This could be better evaluated with MR arthrogram if clinically indicated.  4. Postoperative changes in the proximal humeral diaphysis consistent with known pectoralis insertion repair. However, this area is not adequately evaluated since the axial images do not include this region.    Review of Systems:  Constitutional/General: Negative for fever, chills, change in weight  Integumentary/Skin: Negative for worrisome rashes, moles, or lesions  Neuro: Negative for weakness, dizziness, or paresthesias   Psychiatric: negative for changes in mood or affect    OBJECTIVE:  Physical Exam:  /81 (BP Location: Left arm, Patient Position: Sitting, Cuff Size: Adult Regular)  Pulse 88  Ht 1.626 m (5' 4\")  Wt 90.3 kg (199 lb)  SpO2 97%  BMI 34.16 kg/m2  General Appearance: healthy, alert and no distress   Skin: no suspicious lesions or rashes  Neuro: Normal strength and tone, mentation intact and speech normal  Vascular: good pulses, and cappillary refill   Lymph: no lymphadenopathy   Psych:  mentation appears normal and affect normal/bright  Resp: no increased work of breathing    ASSESSMENT:   1. Recurrent pectoralis major tear, left  2. Labrum tear, left shoulder    PLAN:  Even though the MRI didn't fully cover the area in question, clinically he seems to have a recurrent tear.  I suggest he see the shoulder service at the  due to the recurrent nature of the injury. Referral placed. Follow up as needed.    AVRIL Marquez MD  Dept. Orthopedic Surgery  [facility]    This document " serves as a record of the services and decisions personally performed and made by Dr. AVRIL Marquez MD. It was created on his behalf by Brian Bowles, a trained medical scribe. The creation of this record is based on the provider's personal observations and the statements of the patient. This document has been checked and approved by the attending provider.   Brian Bowles October 9, 2018 11:42 AM

## 2018-10-09 NOTE — MR AVS SNAPSHOT
After Visit Summary   10/9/2018    Wenceslao Hutson    MRN: [id number]           Patient Information     Date Of Birth          1980        Visit Information        Provider Department      10/9/2018 11:15 AM Sven Marquez MD [facility]        Today's Diagnoses     Pectoral muscle rupture, sequela    -  1       Follow-ups after your visit        Additional Services     ORTHO  REFERRAL       [facility] is referring you to the Orthopedic  Services at [facility].       The  Representative will assist you in the coordination of your Orthopedic and Musculoskeletal Care as prescribed by your physician.    The  Representative will call you within 1 business day to help schedule your appointment, or you may contact the  Representative at:    All areas ~ [phone]     Type of Referral : Surgical / Specialist       Timeframe requested: 1 - 3 weeks  Dr THOMAS Benítez MD.     Coverage of these services is subject to the terms and limitations of your health insurance plan.  Please call member services at your health plan with any benefit or coverage questions.      If X-rays, CT or MRI's have been performed, please contact the facility where they were done to arrange for , prior to your scheduled appointment.  Please bring this referral request to your appointment and present it to your specialist.                  Your next 10 appointments already scheduled     Oct 16, 2018  9:20 AM CDT   Office Visit with Keke Randle MD   [facility] ([facility])    [address] [facility] [address]   [phone]           Bring a current list of meds and any records pertaining to this visit. For Physicals, please bring immunization records and any forms needing to be filled out. Please arrive 10 minutes early to complete paperwork.              Who to contact     If you have  "questions or need follow up information about today's clinic visit or your schedule please contact [facility] directly at [phone].  Normal or non-critical lab and imaging results will be communicated to you by MyChart, letter or phone within 4 business days after the clinic has received the results. If you do not hear from us within 7 days, please contact the clinic through [facility] or phone. If you have a critical or abnormal lab result, we will notify you by phone as soon as possible.  Submit refill requests through [employer] or call your pharmacy and they will forward the refill request to us. Please allow 3 business days for your refill to be completed.          Additional Information About Your Visit        [employer] [employer] Information     [employer] gives you secure access to your electronic health record. If you see a primary care provider, you can also send messages to your care team and make appointments. If you have questions, please call your primary care clinic.  If you do not have a primary care provider, please call [phone] and they will assist you.        Care EveryWhere ID     This is your Care EveryWhere ID. This could be used by other organizations to access your [facility] medical records  [id number]        Your Vitals Were     Pulse Height Pulse Oximetry BMI (Body Mass Index)          88 1.626 m (5' 4\") 97% 34.16 kg/m2         Blood Pressure from Last 3 Encounters:   10/09/18 122/81   09/11/18 127/82   09/06/18 110/70    Weight from Last 3 Encounters:   10/09/18 90.3 kg (199 lb)   09/06/18 90.3 kg (199 lb)   08/09/18 87.1 kg (192 lb)              We Performed the Following     ORTHO  REFERRAL        Primary Care Provider Office Phone # Fax #    Keke Randle [phone]       [address] [facility] [place] [address]        Equal Access to Services     CRISTIANO BHANDARI AH: Hadii karena duarte hadasho Soomaali, waaxda luqadaha, qaybta kaalmada adeegyapaul, arik henley " frantz marlenimodesto laRodolfoaan ah. So [facility] [phone].    ATENCIÓN: Si kalynla makayla, tiene a donato disposición servicios gratuitos de asistencia lingüística. Vicky al [phone].    We comply with applicable federal civil rights laws and [place] laws. We do not discriminate on the basis of race, color, national origin, age, disability, sex, sexual orientation, or gender identity.            Thank you!     Thank you for choosing [facility]  for your care. Our goal is always to provide you with excellent care. Hearing back from our patients is one way we can continue to improve our services. Please take a few minutes to complete the written survey that you may receive in the mail after your visit with us. Thank you!             Your Updated Medication List - Protect others around you: Learn how to safely use, store and throw away your medicines at www.disposemymeds.org.          This list is accurate as of 10/9/18 12:14 PM.  Always use your most recent med list.                   Brand Name Dispense Instructions for use Diagnosis    ACE/ARB/ARNI NOT PRESCRIBED (INTENTIONAL)      Please choose reason not prescribed,as he is allergic    Uncontrolled diabetes mellitus type 2 without complications, unspecified long term insulin use status       alcohol swab prep pads     100 each    Use to swab area of injection/sydni as directed.    Type 2 diabetes mellitus without complication, without long-term current use of insulin (H)       amLODIPine 5 MG tablet    NORVASC    90 tablet    Take 1 tablet (5 mg) by mouth daily    Essential hypertension       blood glucose calibration solution    NO BRAND SPECIFIED    1 Bottle    To accompany: Blood Glucose Monitor Brands: per insurance.    Type 2 diabetes mellitus without complication, without long-term current use of insulin (H)       blood glucose monitoring meter device kit    no brand specified    1 kit    Use to test blood sugar 1 times daily or as directed. Preferred  blood glucose meter OR supplies to accompany: Blood Glucose Monitor Brands: per insurance.    Type 2 diabetes mellitus without complication, without long-term current use of insulin (H)       * blood glucose monitoring test strip    no brand specified    400 strip    Use to test blood sugars 4 times daily or as directed    Uncontrolled diabetes mellitus type 2 without complications, unspecified long term insulin use status       * blood glucose monitoring test strip    no brand specified    100 strip    Use to test blood sugar 1 times daily or as directed. To accompany: Blood Glucose Monitor Brands: per insurance.    Type 2 diabetes mellitus without complication, without long-term current use of insulin (H)       cetirizine 10 MG tablet    zyrTEC    90 tablet    Take 1 tablet (10 mg) by mouth every evening    Chronic left shoulder pain, Chronic allergic rhinitis, unspecified seasonality, unspecified trigger       EPINEPHrine 0.3 MG/0.3ML injection 2-pack    EPIPEN/ADRENACLICK/or ANY BX GENERIC EQUIV     Inject 0.3 mg into the muscle        metFORMIN 1000 MG tablet    GLUCOPHAGE    60 tablet    Take 1 tablet (1,000 mg) by mouth 2 times daily (with meals)    Type 2 diabetes mellitus without complication, without long-term current use of insulin (H)       rosuvastatin 10 MG tablet    CRESTOR    30 tablet    Take 1 tablet (10 mg) by mouth daily    Hyperlipidemia associated with type 2 diabetes mellitus (H)       thin lancets    NO BRAND SPECIFIED    100 each    Use with lanceting device. To accompany: Blood Glucose Monitor Brands: per insurance.    Type 2 diabetes mellitus without complication, without long-term current use of insulin (H)       * Notice:  This list has 2 medication(s) that are the same as other medications prescribed for you. Read the directions carefully, and ask your doctor or other care provider to review them with you.

## 2018-10-09 NOTE — LETTER
"    10/9/2018         RE: Wenceslao Hutson  [address] [facility] [address]        Dear Colleague,    Thank you for referring your patient, Wenceslao Hutson, to the [facility]. Please see a copy of my visit note below.    SUBJECTIVE:  Wenceslao Hutson returns for MRI results from 09/15/18 of the LEFT SHOULDER, which are reviewed with the patient by myself and showed:  1. No rotator cuff pathology.  2. Mild hypertrophic degenerative change at the acromioclavicular joint. Trace amount of fluid/edema related to the subacromial/subdeltoid bursa could represent bursitis.  3. Abnormal-appearing anterior inferior labrum, similar to the prior exam. This could be better evaluated with MR arthrogram if clinically indicated.  4. Postoperative changes in the proximal humeral diaphysis consistent with known pectoralis insertion repair. However, this area is not adequately evaluated since the axial images do not include this region.    Review of Systems:  Constitutional/General: Negative for fever, chills, change in weight  Integumentary/Skin: Negative for worrisome rashes, moles, or lesions  Neuro: Negative for weakness, dizziness, or paresthesias   Psychiatric: negative for changes in mood or affect    OBJECTIVE:  Physical Exam:  /81 (BP Location: Left arm, Patient Position: Sitting, Cuff Size: Adult Regular)  Pulse 88  Ht 1.626 m (5' 4\")  Wt 90.3 kg (199 lb)  SpO2 97%  BMI 34.16 kg/m2  General Appearance: healthy, alert and no distress   Skin: no suspicious lesions or rashes  Neuro: Normal strength and tone, mentation intact and speech normal  Vascular: good pulses, and cappillary refill   Lymph: no lymphadenopathy   Psych:  mentation appears normal and affect normal/bright  Resp: no increased work of breathing    ASSESSMENT:   1. Recurrent pectoralis major tear, left  2. Labrum tear, left shoulder    PLAN:  Even though the MRI didn't fully cover the area in question, clinically he seems to " have a recurrent tear.  I suggest he see the shoulder service at the  due to the recurrent nature of the injury. Referral placed. Follow up as needed.    AVRIL Marquez MD  Dept. Orthopedic Surgery  [facility]    This document serves as a record of the services and decisions personally performed and made by Dr. AVRIL Marquez MD. It was created on his behalf by Brian Bowles, a trained medical scribe. The creation of this record is based on the provider's personal observations and the statements of the patient. This document has been checked and approved by the attending provider.   Brian Bowles October 9, 2018 11:42 AM    Again, thank you for allowing me to participate in the care of your patient.        Sincerely,        Sven Marquez MD

## 2018-11-15 ENCOUNTER — PRE VISIT (OUTPATIENT)
Dept: ORTHOPEDICS | Facility: CLINIC | Age: 38
End: 2018-11-15

## 2018-11-16 NOTE — TELEPHONE ENCOUNTER
FUTURE VISIT INFORMATION      FUTURE VISIT INFORMATION:    Date: 11/19    Time: 10:30    Location: [facility]  REFERRAL INFORMATION:    Referring provider:  Dr Marquez    Referring providers clinic:  [facility]    Reason for visit/diagnosis  Left Pectoral muscle rupture    RECORDS REQUESTED FROM:       Clinic name Comments Records Status Imaging Status                                           All records and imaging internal

## 2018-11-19 ENCOUNTER — OFFICE VISIT (OUTPATIENT)
Dept: ORTHOPEDICS | Facility: CLINIC | Age: 38
End: 2018-11-19
Payer: COMMERCIAL

## 2018-11-19 VITALS — WEIGHT: 203 LBS | HEIGHT: 64 IN | BODY MASS INDEX: 34.66 KG/M2

## 2018-11-19 DIAGNOSIS — M79.602 ARM PAIN, DIFFUSE, LEFT: Primary | ICD-10-CM

## 2018-11-19 ASSESSMENT — ENCOUNTER SYMPTOMS
MYALGIAS: 1
NECK PAIN: 1

## 2018-11-19 NOTE — LETTER
11/19/2018       RE: Wenceslao Hutson  [id number]  [facility] [address]     Dear Colleague,    Thank you for referring your patient, Wenceslao Hutson, to the [facility] at [facility]. Please see a copy of my visit note below.        [facility] Orthopa[facility]    Wenceslao Hutson MRN# [id number]   Age: 38 year old YOB: 1980     Requesting physician: self              Impression and Recommendation :   Impression:   Recurrent left pectoral muscle tear  Diffuse left upper extremity pain    Recommendations:   We had an extensive discussion with Mr. Hutson regarding his left shoulder.  Regarding his re-tear of his left pectoral muscle, given the delayed presentation, there is no surgical intervention that can help.  Furthermore, a re-tear of a pectoral muscle would not be responsible for the diffuse nature of the symptoms he is having.  Specifically, left pectoral muscle re-tear should not cause diffuse pain throughout the forearm as well as intermittent numbness and tingling in the hand.  Therefore, we recommend the patient follow-up with a nonoperative orthopedic specialist versus a PM and R doctor in regard to his diffuse left upper extremity symptoms.  However, again, we do not feel surgical intervention will result in functional benefit for his chronic, re-torn left pectoral muscle.  Imaging was reviewed, diagnoses reviewed, patient is satisfied with the plan.  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  Patient notes he ruptured his left pectoral tendon while lifting weights in November 2011.  He underwent  repair in April 2012 with Dr. Infante.  He states that following his surgery, he did not follow postoperative restrictions, and approximately postoperative day 4, he noted a tearing sensation in his left axillary area when he went to  a heavy item.  He also noted severe pain however states he never followed up with Dr. Infante in this regard.  He states since then, he has developed significant left arm forearm and left sided neck pain.  He also notes intermittent numbness and tingling diffusely throughout his left upper extremity.  He cannot localize the pain to one specific area and feels that the left neck arm and forearm pain are sometimes in continuity.  Pain 10/10 at worst.  Has not done PT.  No injections.  He denies any interval trauma since his initial injury to his left pectoral muscle.  He denies any change in activity since worsening of this pain.  He states the pain is present at all times.  It limits his ability to sleep and work.  He cannot find any specific activities which exacerbate the pain or numbness and tingling in his left upper extremity, simply states it is present all the time, and worse with any movement at all.  He has no symptoms in his right upper extremity.  He has no cyanosis bilateral lower extremities.  Outside of his diffuse left upper extremity pain, he has no other questions or concerns.  denies new numbness/tingling/weakness, denies fevers, chills, sob, cp.                 Past Medical History:     Past Medical History:   Diagnosis Date     Chronic tonsillitis        Prior history of blood clot: none  Prior history of bleeding problems: no  Prior history of anesthetic complications: none  Currently on opioids:  none  History of Diabetes: no          Past Surgical History:     Past Surgical History:   Procedure Laterality Date     SHOULDER SURGERY  2007-Left     TONSILLECTOMY  12/22/2011    Procedure:TONSILLECTOMY; Tonsillectomy; Surgeon:NAA CASIANO; Location: OR              Social History:     Social History   Substance Use Topics     Smoking status: Former Smoker     Years: 3.00     Types: Cigarettes     Start date: 6/15/2005     Smokeless tobacco: Former User     Quit date: 12/22/2011      Comment: smoke free household.     Alcohol use Yes      Comment: on the weekends             Family History:   Reviewed, noncontributory          Allergies:     Allergies   Allergen Reactions     Lisinopril Swelling     probable     Atorvastatin              Medications:     Current Outpatient Prescriptions   Medication Sig     ACE/ARB/ARNI NOT PRESCRIBED, INTENTIONAL, Please choose reason not prescribed,as he is allergic     alcohol swab prep pads Use to swab area of injection/sydni as directed.     amLODIPine (NORVASC) 5 MG tablet Take 1 tablet (5 mg) by mouth daily     blood glucose calibration (NO BRAND SPECIFIED) solution To accompany: Blood Glucose Monitor Brands: per insurance.     blood glucose monitoring (NO BRAND SPECIFIED) meter device kit Use to test blood sugar 1 times daily or as directed. Preferred blood glucose meter OR supplies to accompany: Blood Glucose Monitor Brands: per insurance.     blood glucose monitoring (NO BRAND SPECIFIED) test strip Use to test blood sugar 1 times daily or as directed. To accompany: Blood Glucose Monitor Brands: per insurance.     blood glucose monitoring (NO BRAND SPECIFIED) test strip Use to test blood sugars 4 times daily or as directed     cetirizine (ZYRTEC) 10 MG tablet Take 1 tablet (10 mg) by mouth every evening     EPINEPHrine (EPIPEN/ADRENACLICK/OR ANY BX GENERIC EQUIV) 0.3 MG/0.3ML injection 2-pack Inject 0.3 mg into the muscle     metFORMIN (GLUCOPHAGE) 1000 MG tablet Take 1 tablet (1,000 mg) by mouth 2 times daily (with meals)     rosuvastatin (CRESTOR) 10 MG tablet Take 1 tablet (10 mg) by mouth daily     thin (NO BRAND SPECIFIED) lancets Use with lanceting device. To accompany: Blood Glucose Monitor Brands: per insurance.      No current facility-administered medications for this visit.              Review of Systems:   10 point ROS negative unless noted in HPI          Physical Exam:     General: Awake, alert, appropriate, following commands, NAD.  Neuro: CN II-XII grossly intact.   Skin: No rashes,  skin color normal.  HEENT: Normal.   Lungs: Breathing comfortably and nonlabored, no wheezes or stridor noted.  Heart/Cardiovascular: Regular pulse, no peripheral cyanosis.    Left Upper Extremity: incisions healed, atrophy of left pec compared to right, otherwise No deformity, skin intact. +TTP throughout entire LUE, does not localize, = tenderness to palpation over clavicle, AC joint, shoulder, arm, elbow, forearm, wrist. Very limited ROM shoulder, elbow, wrist due to pain, less than 30 deg in any plane at any joint, Motor intact distally with finger flexion/extension/intrinsics/EPL, OK sign 4/5 strength. SILT ax/m/r/u nerve distributions. Radial pulse palpable, 2+. Compartments soft, -tinel wrist and elbow, - james, shoulder range of motion: Forward elevation 50 degrees, abduction 30 degrees, internal rotation to side, external rotation to 0, every plan limited by severe pain    No sig b/l LE lymphedema  Psych: normal mood and affect           Data:   Reviewed, demonstrates re-tear of prior PICC repair surgery, no acute fracture dislocation, no areas of bony edema      Anish Arango MD MS  Orthopedic Surgery, PGY-5       This patient was seen and discussed with Dr. Benítez who agrees with the plan           I saw the patient with the resident.  I agree with the resident note and plan of care.      Sixto Benítez MD

## 2018-11-19 NOTE — MR AVS SNAPSHOT
"              After Visit Summary   11/19/2018    Wenceslao Htuson    MRN: [id number]           Patient Information     Date Of Birth          1980        Visit Information        Provider Department      11/19/2018 10:30 AM Sixto Benítez MD [facility]        Today's Diagnoses     Arm pain, diffuse, left    -  1       Follow-ups after your visit        Follow-up notes from your care team     Return if symptoms worsen or fail to improve.      Who to contact     Please call your clinic at [phone] to:    Ask questions about your health    Make or cancel appointments    Discuss your medicines    Learn about your test results    Speak to your doctor            Additional Information About Your Visit        [employer] Information     [facility] gives you secure access to your electronic health record. If you see a primary care provider, you can also send messages to your care team and make appointments. If you have questions, please call your primary care clinic.  If you do not have a primary care provider, please call [phone] and they will assist you.      [facility] is an electronic gateway that provides easy, online access to your medical records. With [facility], you can request a clinic appointment, read your test results, renew a prescription or communicate with your care team.     To access your existing account, please contact your [facility] or call [phone] for assistance.        Care EveryWhere ID     This is your Care EveryWhere ID. This could be used by other organizations to access your [facility] medical records  [id number]        Your Vitals Were     Height BMI (Body Mass Index)                1.626 m (5' 4\") 34.84 kg/m2           Blood Pressure from Last 3 Encounters:   10/09/18 122/81   09/11/18 127/82   09/06/18 110/70    Weight from Last 3 Encounters:   11/19/18 92.1 kg (203 lb)   10/09/18 90.3 kg (199 lb)   09/06/18 90.3 kg (199 lb)    "           Today, you had the following     No orders found for display       Primary Care Provider Office Phone # Fax #    Keke Randle [phone] [address] [facility]  [facility] [address]        Equal Access to Services     CRISTIANO BHANDARI : Hadmickie karena duarte lavonneo Soomarali, waaxda luqadaha, qaybta kaalmada adelauren, arik hunt lastevenjunior knox. So [facility] [phone].    ATENCIÓN: Si habla español, tiene a donato disposición servicios gratuitos de asistencia lingüística. Llame al [phone].    We comply with applicable federal civil rights laws and [place] laws. We do not discriminate on the basis of race, color, national origin, age, disability, sex, sexual orientation, or gender identity.            Thank you!     Thank you for choosing [facility]  for your care. Our goal is always to provide you with excellent care. Hearing back from our patients is one way we can continue to improve our services. Please take a few minutes to complete the written survey that you may receive in the mail after your visit with us. Thank you!             Your Updated Medication List - Protect others around you: Learn how to safely use, store and throw away your medicines at www.disposemymeds.org.          This list is accurate as of 11/19/18 11:59 PM.  Always use your most recent med list.                   Brand Name Dispense Instructions for use Diagnosis    ACE/ARB/ARNI NOT PRESCRIBED (INTENTIONAL)      Please choose reason not prescribed,as he is allergic    Uncontrolled diabetes mellitus type 2 without complications, unspecified long term insulin use status       alcohol swab prep pads     100 each    Use to swab area of injection/sydni as directed.    Type 2 diabetes mellitus without complication, without long-term current use of insulin (H)       amLODIPine 5 MG tablet    NORVASC    90 tablet    Take 1 tablet (5 mg) by mouth daily    Essential hypertension       blood glucose  calibration solution    NO BRAND SPECIFIED    1 Bottle    To accompany: Blood Glucose Monitor Brands: per insurance.    Type 2 diabetes mellitus without complication, without long-term current use of insulin (H)       blood glucose monitoring meter device kit    no brand specified    1 kit    Use to test blood sugar 1 times daily or as directed. Preferred blood glucose meter OR supplies to accompany: Blood Glucose Monitor Brands: per insurance.    Type 2 diabetes mellitus without complication, without long-term current use of insulin (H)       * blood glucose monitoring test strip    no brand specified    400 strip    Use to test blood sugars 4 times daily or as directed    Uncontrolled diabetes mellitus type 2 without complications, unspecified long term insulin use status       * blood glucose monitoring test strip    no brand specified    100 strip    Use to test blood sugar 1 times daily or as directed. To accompany: Blood Glucose Monitor Brands: per insurance.    Type 2 diabetes mellitus without complication, without long-term current use of insulin (H)       cetirizine 10 MG tablet    zyrTEC    90 tablet    Take 1 tablet (10 mg) by mouth every evening    Chronic left shoulder pain, Chronic allergic rhinitis, unspecified seasonality, unspecified trigger       EPINEPHrine 0.3 MG/0.3ML injection 2-pack    EPIPEN/ADRENACLICK/or ANY BX GENERIC EQUIV     Inject 0.3 mg into the muscle        metFORMIN 1000 MG tablet    GLUCOPHAGE    60 tablet    Take 1 tablet (1,000 mg) by mouth 2 times daily (with meals)    Type 2 diabetes mellitus without complication, without long-term current use of insulin (H)       rosuvastatin 10 MG tablet    CRESTOR    30 tablet    Take 1 tablet (10 mg) by mouth daily    Hyperlipidemia associated with type 2 diabetes mellitus (H)       thin lancets    NO BRAND SPECIFIED    100 each    Use with lanceting device. To accompany: Blood Glucose Monitor Brands: per insurance.    Type 2 diabetes  mellitus without complication, without long-term current use of insulin (H)       * Notice:  This list has 2 medication(s) that are the same as other medications prescribed for you. Read the directions carefully, and ask your doctor or other care provider to review them with you.

## 2018-11-19 NOTE — PROGRESS NOTES
[facility] Orthopaedic Consultation    Wenceslao Hutson MRN# [id number]   Age: 38 year old YOB: 1980     Requesting physician: self              Impression and Recommendation :   Impression:   Recurrent left pectoral muscle tear  Diffuse left upper extremity pain    Recommendations:   We had an extensive discussion with Mr. Hutson regarding his left shoulder.  Regarding his re-tear of his left pectoral muscle, given the delayed presentation, there is no surgical intervention that can help.  Furthermore, a re-tear of a pectoral muscle would not be responsible for the diffuse nature of the symptoms he is having.  Specifically, left pectoral muscle re-tear should not cause diffuse pain throughout the forearm as well as intermittent numbness and tingling in the hand.  Therefore, we recommend the patient follow-up with a nonoperative orthopedic specialist versus a PM and R doctor in regard to his diffuse left upper extremity symptoms.  However, again, we do not feel surgical intervention will result in functional benefit for his chronic, re-torn left pectoral muscle.  Imaging was reviewed, diagnoses reviewed, patient is satisfied with the plan.  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  Patient notes he ruptured his left pectoral tendon while lifting weights in November 2011.  He underwent repair in April 2012 with Dr. Infante.  He states that following his surgery, he did not follow postoperative restrictions, and approximately postoperative day 4, he noted a tearing sensation in his left axillary area when he went to  a heavy item.  He also noted severe pain however  states he never followed up with Dr. Infante in this regard.  He states since then, he has developed significant left arm forearm and left sided neck pain.  He also notes intermittent numbness and tingling diffusely throughout his left upper extremity.  He cannot localize the pain to one specific area and feels that the left neck arm and forearm pain are sometimes in continuity.  Pain 10/10 at worst.  Has not done PT.  No injections.  He denies any interval trauma since his initial injury to his left pectoral muscle.  He denies any change in activity since worsening of this pain.  He states the pain is present at all times.  It limits his ability to sleep and work.  He cannot find any specific activities which exacerbate the pain or numbness and tingling in his left upper extremity, simply states it is present all the time, and worse with any movement at all.  He has no symptoms in his right upper extremity.  He has no cyanosis bilateral lower extremities.  Outside of his diffuse left upper extremity pain, he has no other questions or concerns.  denies new numbness/tingling/weakness, denies fevers, chills, sob, cp.                 Past Medical History:     Past Medical History:   Diagnosis Date     Chronic tonsillitis        Prior history of blood clot: none  Prior history of bleeding problems: no  Prior history of anesthetic complications: none  Currently on opioids:  none  History of Diabetes: no          Past Surgical History:     Past Surgical History:   Procedure Laterality Date     SHOULDER SURGERY  2007-Left     TONSILLECTOMY  12/22/2011    Procedure:TONSILLECTOMY; Tonsillectomy; Surgeon:NAA CASIANO; Location: OR             Social History:     Social History   Substance Use Topics     Smoking status: Former Smoker     Years: 3.00     Types: Cigarettes     Start date: 6/15/2005     Smokeless tobacco: Former User     Quit date: 12/22/2011      Comment: smoke free household.     Alcohol use Yes       Comment: on the weekends             Family History:   Reviewed, noncontributory          Allergies:     Allergies   Allergen Reactions     Lisinopril Swelling     probable     Atorvastatin              Medications:     Current Outpatient Prescriptions   Medication Sig     ACE/ARB/ARNI NOT PRESCRIBED, INTENTIONAL, Please choose reason not prescribed,as he is allergic     alcohol swab prep pads Use to swab area of injection/sydni as directed.     amLODIPine (NORVASC) 5 MG tablet Take 1 tablet (5 mg) by mouth daily     blood glucose calibration (NO BRAND SPECIFIED) solution To accompany: Blood Glucose Monitor Brands: per insurance.     blood glucose monitoring (NO BRAND SPECIFIED) meter device kit Use to test blood sugar 1 times daily or as directed. Preferred blood glucose meter OR supplies to accompany: Blood Glucose Monitor Brands: per insurance.     blood glucose monitoring (NO BRAND SPECIFIED) test strip Use to test blood sugar 1 times daily or as directed. To accompany: Blood Glucose Monitor Brands: per insurance.     blood glucose monitoring (NO BRAND SPECIFIED) test strip Use to test blood sugars 4 times daily or as directed     cetirizine (ZYRTEC) 10 MG tablet Take 1 tablet (10 mg) by mouth every evening     EPINEPHrine (EPIPEN/ADRENACLICK/OR ANY BX GENERIC EQUIV) 0.3 MG/0.3ML injection 2-pack Inject 0.3 mg into the muscle     metFORMIN (GLUCOPHAGE) 1000 MG tablet Take 1 tablet (1,000 mg) by mouth 2 times daily (with meals)     rosuvastatin (CRESTOR) 10 MG tablet Take 1 tablet (10 mg) by mouth daily     thin (NO BRAND SPECIFIED) lancets Use with lanceting device. To accompany: Blood Glucose Monitor Brands: per insurance.     No current facility-administered medications for this visit.              Review of Systems:   10 point ROS negative unless noted in HPI          Physical Exam:     General: Awake, alert, appropriate, following commands, NAD.  Neuro: CN II-XII grossly intact.   Skin: No rashes,  skin  color normal.  HEENT: Normal.   Lungs: Breathing comfortably and nonlabored, no wheezes or stridor noted.  Heart/Cardiovascular: Regular pulse, no peripheral cyanosis.    Left Upper Extremity: incisions healed, atrophy of left pec compared to right, otherwise No deformity, skin intact. +TTP throughout entire LUE, does not localize, = tenderness to palpation over clavicle, AC joint, shoulder, arm, elbow, forearm, wrist. Very limited ROM shoulder, elbow, wrist due to pain, less than 30 deg in any plane at any joint, Motor intact distally with finger flexion/extension/intrinsics/EPL, OK sign 4/5 strength. SILT ax/m/r/u nerve distributions. Radial pulse palpable, 2+. Compartments soft, -tinel wrist and elbow, - james, shoulder range of motion: Forward elevation 50 degrees, abduction 30 degrees, internal rotation to side, external rotation to 0, every plan limited by severe pain    No sig b/l LE lymphedema  Psych: normal mood and affect           Data:   Reviewed, demonstrates re-tear of prior PICC repair surgery, no acute fracture dislocation, no areas of bony edema      Anish Arango MD MS  Orthopedic Surgery, PGY-5       This patient was seen and discussed with Dr. Benítez who agrees with the plan           Answers for HPI/ROS submitted by the patient on 11/19/2018   General Symptoms: No  Skin Symptoms: No  HENT Symptoms: No  EYE SYMPTOMS: No  HEART SYMPTOMS: No  LUNG SYMPTOMS: No  INTESTINAL SYMPTOMS: No  URINARY SYMPTOMS: No  REPRODUCTIVE SYMPTOMS: No  SKELETAL SYMPTOMS: Yes  BLOOD SYMPTOMS: No  NERVOUS SYSTEM SYMPTOMS: No  MENTAL HEALTH SYMPTOMS: No  Muscle aches: Yes  Neck pain: Yes    I saw the patient with the resident.  I agree with the resident note and plan of care.      Sixto Benítez MD

## 2018-11-19 NOTE — NURSING NOTE
"Reason For Visit:   Chief Complaint   Patient presents with     Consult     left pectoral muscle rupture. pain is from neck down to mid forearm.  Referred by Dr. Marquez        PCP: Keke Randle      Occupation [employer]  Currently working? Yes.  Work status?  Full time.  Date of injury: Unsure of date  Type of injury: Lifting weights  Date of surgery: 2007  Type of surgery: Left pectoral muscle repair.  Smoker: No  Right hand dominant    SANE score  Affected shoulder: Left   Right shoulder SANE: 100  Left shoulder SANE: 0    Dynamometer  Forward Elevation:  R = 25 pounds,  L = 5 pounds  External Rotation:   R = 16 pounds,  L = 0 pounds    Ht 1.626 m (5' 4\")  Wt 92.1 kg (203 lb)  BMI 34.84 kg/m2      Pain Assessment  Patient Currently in Pain: Yes  0-10 Pain Scale: 8  Primary Pain Location: Shoulder  Pain Orientation: Left  Pain Descriptors: Sharp, Shooting, Aching      Donna Osborn LPN        "

## 2018-11-29 NOTE — PROGRESS NOTES
SUBJECTIVE:   Wenceslao Hutson is a 38 year old male who presents to clinic today for the following health issues:    Diabetes Follow-up      Patient is checking blood sugars: not at all    Diabetic concerns: None     Symptoms of hypoglycemia (low blood sugar): none     Paresthesias (numbness or burning in feet) or sores: No     Date of last diabetic eye exam: 6 months ago    BP Readings from Last 2 Encounters:   11/30/18 136/84   10/09/18 122/81     Hemoglobin A1C (%)   Date Value   11/30/2018 5.7 (H)   08/09/2018 5.6     LDL Cholesterol Calculated (mg/dL)   Date Value   08/09/2018 70   04/19/2018     Cannot estimate LDL when triglyceride exceeds 400 mg/dL     LDL Cholesterol Direct (mg/dL)   Date Value   04/19/2018 127 (H)     Diabetes Management Resources    Amount of exercise or physical activity: None    Problems taking medications regularly: No    Medication side effects: none    Diet: diabetic    Patient has a history of pectoralis muscle rupture and re-rupture, continues to have chronic left shoulder pain most of the time, no particular movement makes it better or worse, taking NSAIDS for pain.  Patient has follow-up with ortho for this issue as well.  No surgical intervention recommended    Wenceslao presents today for HTN follow-up visit.  Currently taking norvasc 5mg and has been taking medications as prescribed.  No reported side effects.  Blood pressure ranges are normal.  Patient has been exercising on a regular basis and is not monitoring sodium intake.     Problem list and histories reviewed & adjusted, as indicated.  Additional history: as documented    BP Readings from Last 3 Encounters:   11/30/18 136/84   10/09/18 122/81   09/11/18 127/82    Wt Readings from Last 3 Encounters:   11/30/18 204 lb 9.6 oz (92.8 kg)   11/19/18 203 lb (92.1 kg)   10/09/18 199 lb (90.3 kg)           Reviewed and updated as needed this visit by clinical staff  Tobacco  Allergies  Meds  Problems       Reviewed and  updated as needed this visit by Provider  Allergies  Meds  Problems         ROS:  Constitutional, HEENT, cardiovascular, pulmonary, gi and gu systems are negative, except as otherwise noted.    OBJECTIVE:     /84  Pulse 117  Temp 97  F (36.1  C) (Oral)  Resp 18  Wt 204 lb 9.6 oz (92.8 kg)  SpO2 95%  BMI 35.12 kg/m2  Body mass index is 35.12 kg/(m^2).  GENERAL: healthy, alert and no distress  EYES: Eyes grossly normal to inspection, PERRL and conjunctivae and sclerae normal  NECK: no adenopathy, no asymmetry, masses, or scars and thyroid normal to palpation  RESP: lungs clear to auscultation - no rales, rhonchi or wheezes  CV: regular rate and rhythm, normal S1 S2, no S3 or S4, no murmur, click or rub, no peripheral edema and peripheral pulses strong  ABDOMEN: soft, nontender, no hepatosplenomegaly, no masses and bowel sounds normal  MS: pain with all rotator cuff testing, however no weakness, pain with near test < 90 degrees, no shoulder crepitus, he does have tenderness over anterior aspect of labrum.    SKIN: no suspicious lesions or rashes  NEURO: Normal strength and tone, mentation intact and speech normal  PSYCH: mentation appears normal, affect normal/bright    ASSESSMENT/PLAN:     1. Type 2 diabetes mellitus without complication, with long-term current use of insulin (H)  Will check A1C today and plan based on result  - Hemoglobin A1c    2. Hyperlipidemia associated with type 2 diabetes mellitus (H)  Med refill  - rosuvastatin (CRESTOR) 10 MG tablet; Take 1 tablet (10 mg) by mouth daily  Dispense: 90 tablet; Refill: 3    3. Chronic left shoulder pain  Will refer to sports med for further eval and treatment  - cetirizine (ZYRTEC) 10 MG tablet; Take 1 tablet (10 mg) by mouth every evening  Dispense: 90 tablet; Refill: 3  - ORTHO  REFERRAL    4. Essential hypertension  Acceptable control will refill medication  - amLODIPine (NORVASC) 5 MG tablet; Take 1 tablet (5 mg) by mouth daily   Dispense: 90 tablet; Refill: 1    5. Pectoralis muscle rupture, sequela  As above  - ORTHO  REFERRAL    Follow up if symptoms worsen or fail to improve.     Roland Cherry MD  [facility]

## 2018-11-30 ENCOUNTER — OFFICE VISIT (OUTPATIENT)
Dept: FAMILY MEDICINE | Facility: CLINIC | Age: 38
End: 2018-11-30
Payer: COMMERCIAL

## 2018-11-30 VITALS
HEART RATE: 117 BPM | OXYGEN SATURATION: 95 % | DIASTOLIC BLOOD PRESSURE: 84 MMHG | BODY MASS INDEX: 35.12 KG/M2 | TEMPERATURE: 97 F | RESPIRATION RATE: 18 BRPM | SYSTOLIC BLOOD PRESSURE: 136 MMHG | WEIGHT: 204.6 LBS

## 2018-11-30 DIAGNOSIS — E78.5 HYPERLIPIDEMIA ASSOCIATED WITH TYPE 2 DIABETES MELLITUS (H): ICD-10-CM

## 2018-11-30 DIAGNOSIS — G89.29 CHRONIC LEFT SHOULDER PAIN: ICD-10-CM

## 2018-11-30 DIAGNOSIS — E11.9 TYPE 2 DIABETES MELLITUS WITHOUT COMPLICATION, WITH LONG-TERM CURRENT USE OF INSULIN (H): Primary | ICD-10-CM

## 2018-11-30 DIAGNOSIS — Z79.4 TYPE 2 DIABETES MELLITUS WITHOUT COMPLICATION, WITH LONG-TERM CURRENT USE OF INSULIN (H): Primary | ICD-10-CM

## 2018-11-30 DIAGNOSIS — I10 ESSENTIAL HYPERTENSION: ICD-10-CM

## 2018-11-30 DIAGNOSIS — M25.512 CHRONIC LEFT SHOULDER PAIN: ICD-10-CM

## 2018-11-30 DIAGNOSIS — E11.69 HYPERLIPIDEMIA ASSOCIATED WITH TYPE 2 DIABETES MELLITUS (H): ICD-10-CM

## 2018-11-30 DIAGNOSIS — S29.011S PECTORALIS MUSCLE RUPTURE, SEQUELA: ICD-10-CM

## 2018-11-30 LAB — HBA1C MFR BLD: 5.7 % (ref 0–5.6)

## 2018-11-30 PROCEDURE — 99214 OFFICE O/P EST MOD 30 MIN: CPT | Performed by: FAMILY MEDICINE

## 2018-11-30 PROCEDURE — 36415 COLL VENOUS BLD VENIPUNCTURE: CPT | Performed by: FAMILY MEDICINE

## 2018-11-30 PROCEDURE — 83036 HEMOGLOBIN GLYCOSYLATED A1C: CPT | Performed by: FAMILY MEDICINE

## 2018-11-30 RX ORDER — CETIRIZINE HYDROCHLORIDE 10 MG/1
10 TABLET ORAL EVERY EVENING
Qty: 90 TABLET | Refills: 3 | Status: SHIPPED | OUTPATIENT
Start: 2018-11-30 | End: 2019-10-23

## 2018-11-30 RX ORDER — AMLODIPINE BESYLATE 5 MG/1
5 TABLET ORAL DAILY
Qty: 90 TABLET | Refills: 1 | Status: SHIPPED | OUTPATIENT
Start: 2018-11-30 | End: 2019-04-24

## 2018-11-30 RX ORDER — ROSUVASTATIN CALCIUM 10 MG/1
10 TABLET, COATED ORAL DAILY
Qty: 90 TABLET | Refills: 3 | Status: SHIPPED | OUTPATIENT
Start: 2018-11-30 | End: 2019-04-24

## 2018-11-30 NOTE — MR AVS SNAPSHOT
After Visit Summary   11/30/2018    Wenceslao Hutson    MRN: [id number]           Patient Information     Date Of Birth          1980        Visit Information        Provider Department      11/30/2018 10:40 AM Roland Cherry MD [facility]        Today's Diagnoses     Type 2 diabetes mellitus without complication, with long-term current use of insulin (H)    -  1    Hyperlipidemia associated with type 2 diabetes mellitus (H)        Chronic left shoulder pain        Essential hypertension        Pectoralis muscle rupture, sequela          Care Instructions    [facility]    If you have any questions regarding to your visit please contact your care team:       Team Purple:   Clinic Hours Telephone Number   Dr. Cira Cherry   7am-7pm  Monday - Thursday   7am-5pm  Fridays  [phone]  (Appointment scheduling available 24/7)   Urgent Care - [place] and [facility] - 11am-9pm Monday-Friday Saturday-Sunday- 9am-5pm   [place] - 5pm-9pm Monday-Friday Saturday-Sunday- 9am-5pm  [phone] - [place]  [phone] - [place]       What options do I have for a visit other than an office visit? We offer electronic visits (e-visits) and telephone visits, when medically appropriate.  Please check with your medical insurance to see if these types of visits are covered, as you will be responsible for any charges that are not paid by your insurance.      You can use [employer] (secure electronic communication) to access to your chart, send your primary care provider a message, or make an appointment. Ask a team member how to get started.     For a price quote for your services, please call our Consumer Price Line at [phone] or our Imaging Cost estimation line at [phone] (for imaging tests).    Chris Bajwa            Follow-ups after your visit        Additional Services     ORTHO   REFERRAL       [facility] is referring you to the Orthopedic  Services at [facility].       The  Representative will assist you in the coordination of your Orthopedic and Musculoskeletal Care as prescribed by your physician.    The  Representative will call you within 1 business day to help schedule your appointment, or you may contact the  Representative at:    All areas ~ [phone]     Type of Referral : Non Surgical / Sport Medicine       Timeframe requested: 1 - 2 days    Coverage of these services is subject to the terms and limitations of your health insurance plan.  Please call member services at your health plan with any benefit or coverage questions.      If X-rays, CT or MRI's have been performed, please contact the facility where they were done to arrange for , prior to your scheduled appointment.  Please bring this referral request to your appointment and present it to your specialist.                  Who to contact     If you have questions or need follow up information about today's clinic visit or your schedule please contact [facility] directly at [phone].  Normal or non-critical lab and imaging results will be communicated to you by MyChart, letter or phone within 4 business days after the clinic has received the results. If you do not hear from us within 7 days, please contact the clinic through [employer] or phone. If you have a critical or abnormal lab result, we will notify you by phone as soon as possible.  Submit refill requests through [email or web address] or call your pharmacy and they will forward the refill request to us. Please allow 3 business days for your refill to be completed.          Additional Information About Your Visit        [employer] Information     [email or web address] gives you secure access to your electronic health record. If you see a primary care provider, you can also send messages to  your care team and make appointments. If you have questions, please call your primary care clinic.  If you do not have a primary care provider, please call [phone] and they will assist you.        Care EveryWhere ID     This is your Care EveryWhere ID. This could be used by other organizations to access your [place] medical records  [id number]        Your Vitals Were     Pulse Temperature Respirations Pulse Oximetry BMI (Body Mass Index)       117 97  F (36.1  C) (Oral) 18 95% 35.12 kg/m2        Blood Pressure from Last 3 Encounters:   11/30/18 136/84   10/09/18 122/81   09/11/18 127/82    Weight from Last 3 Encounters:   11/30/18 204 lb 9.6 oz (92.8 kg)   11/19/18 203 lb (92.1 kg)   10/09/18 199 lb (90.3 kg)              We Performed the Following     Hemoglobin A1c     ORTHO  REFERRAL          Where to get your medicines      These medications were sent to [facility] [address] [place] [address] AVE [address] AT [place] [id number], [facility] [address]     Phone:  [phone]     amLODIPine 5 MG tablet    cetirizine 10 MG tablet    rosuvastatin 10 MG tablet          Primary Care Provider Office Phone # Fax #    Keke Randle [phone] [address] [facility] [address]        Equal Access to Services     [facility]: Hadii aad ku hadasho Soomaali, waaxda luqadaha, qaybta kaalmada adeegyada, arik perkins haytrudy knox. So [facility] [phone].    ATENCIÓN: Si habla español, tiene a dontao disposición servicios gratuitos de asistencia lingüística. Vicky al [phone].    We comply with applicable federal civil rights laws and [place] laws. We do not discriminate on the basis of race, color, national origin, age, disability, sex, sexual orientation, or gender identity.            Thank you!     Thank you for choosing [facility]  for your care. Our goal is always to provide you with excellent care. Hearing  back from our patients is one way we can continue to improve our services. Please take a few minutes to complete the written survey that you may receive in the mail after your visit with us. Thank you!             Your Updated Medication List - Protect others around you: Learn how to safely use, store and throw away your medicines at www.disposemymeds.org.          This list is accurate as of 11/30/18 11:42 AM.  Always use your most recent med list.                   Brand Name Dispense Instructions for use Diagnosis    ACE/ARB/ARNI NOT PRESCRIBED    INTENTIONAL     Please choose reason not prescribed,as he is allergic    Uncontrolled diabetes mellitus type 2 without complications, unspecified long term insulin use status       alcohol swab prep pads     100 each    Use to swab area of injection/sydni as directed.    Type 2 diabetes mellitus without complication, without long-term current use of insulin (H)       amLODIPine 5 MG tablet    NORVASC    90 tablet    Take 1 tablet (5 mg) by mouth daily    Essential hypertension       blood glucose calibration solution    NO BRAND SPECIFIED    1 Bottle    To accompany: Blood Glucose Monitor Brands: per insurance.    Type 2 diabetes mellitus without complication, without long-term current use of insulin (H)       blood glucose monitoring meter device kit    NO BRAND SPECIFIED    1 kit    Use to test blood sugar 1 times daily or as directed. Preferred blood glucose meter OR supplies to accompany: Blood Glucose Monitor Brands: per insurance.    Type 2 diabetes mellitus without complication, without long-term current use of insulin (H)       * blood glucose monitoring test strip    NO BRAND SPECIFIED    400 strip    Use to test blood sugars 4 times daily or as directed    Uncontrolled diabetes mellitus type 2 without complications, unspecified long term insulin use status       * blood glucose monitoring test strip    NO BRAND SPECIFIED    100 strip    Use to test blood sugar  1 times daily or as directed. To accompany: Blood Glucose Monitor Brands: per insurance.    Type 2 diabetes mellitus without complication, without long-term current use of insulin (H)       cetirizine 10 MG tablet    zyrTEC    90 tablet    Take 1 tablet (10 mg) by mouth every evening    Chronic left shoulder pain       EPINEPHrine 0.3 MG/0.3ML injection 2-pack    EPIPEN/ADRENACLICK/or ANY BX GENERIC EQUIV     Inject 0.3 mg into the muscle        metFORMIN 1000 MG tablet    GLUCOPHAGE    60 tablet    Take 1 tablet (1,000 mg) by mouth 2 times daily (with meals)    Type 2 diabetes mellitus without complication, without long-term current use of insulin (H)       rosuvastatin 10 MG tablet    CRESTOR    90 tablet    Take 1 tablet (10 mg) by mouth daily    Hyperlipidemia associated with type 2 diabetes mellitus (H)       thin lancets    NO BRAND SPECIFIED    100 each    Use with lanceting device. To accompany: Blood Glucose Monitor Brands: per insurance.    Type 2 diabetes mellitus without complication, without long-term current use of insulin (H)       * Notice:  This list has 2 medication(s) that are the same as other medications prescribed for you. Read the directions carefully, and ask your doctor or other care provider to review them with you.

## 2018-11-30 NOTE — PATIENT INSTRUCTIONS
[facility]    If you have any questions regarding to your visit please contact your care team:       Team Purple:   Clinic Hours Telephone Number   Dr. Cira Cherry   7am-7pm  Monday - Thursday   7am-5pm  Fridays  [phone]  (Appointment scheduling available 24/7)   Urgent Care - [place] and [facility] - 11am-9pm Monday-Friday Saturday-Sunday- 9am-5pm   [place] - 5pm-9pm Monday-Friday Saturday-Sunday- 9am-5pm  [phone] - [place]  [phone] - [place]       What options do I have for a visit other than an office visit? We offer electronic visits (e-visits) and telephone visits, when medically appropriate.  Please check with your medical insurance to see if these types of visits are covered, as you will be responsible for any charges that are not paid by your insurance.      You can use [employer] (secure electronic communication) to access to your chart, send your primary care provider a message, or make an appointment. Ask a team member how to get started.     For a price quote for your services, please call our Consumer Price Line at [phone] or our Imaging Cost estimation line at [phone] (for imaging tests).    Chris Bajwa

## 2018-11-30 NOTE — LETTER
[facility]  [address] [place] [address]    December 3, 2018    Wenceslao Hutson  [address]  [facility] [address]          Dear Wenceslao,    Your A1C is at the low end of the pre-diabetic range.  I would just recommend adopting a healthy diet and exercise plan, lose a little weight and this should improve.  Let me know if you have any questions.     Enclosed is a copy of your results.     Results for orders placed or performed in visit on 11/30/18   Hemoglobin A1c   Result Value Ref Range    Hemoglobin A1C 5.7 (H) 0 - 5.6 %       If you have any questions or concerns, please call myself or my nurse at [phone].      Sincerely,        Roland Hernandez MD/jaguar

## 2018-12-19 ENCOUNTER — OFFICE VISIT (OUTPATIENT)
Dept: ORTHOPEDICS | Facility: CLINIC | Age: 38
End: 2018-12-19
Payer: COMMERCIAL

## 2018-12-19 VITALS
WEIGHT: 204 LBS | SYSTOLIC BLOOD PRESSURE: 128 MMHG | HEIGHT: 64 IN | DIASTOLIC BLOOD PRESSURE: 82 MMHG | BODY MASS INDEX: 34.83 KG/M2

## 2018-12-19 DIAGNOSIS — R20.2 LEFT HAND PARESTHESIA: ICD-10-CM

## 2018-12-19 DIAGNOSIS — M54.2 CERVICALGIA: Primary | ICD-10-CM

## 2018-12-19 PROCEDURE — 99244 OFF/OP CNSLTJ NEW/EST MOD 40: CPT | Performed by: PEDIATRICS

## 2018-12-19 ASSESSMENT — MIFFLIN-ST. JEOR: SCORE: 1756.34

## 2018-12-19 NOTE — LETTER
"    12/19/2018         RE: Wenceslao Hutson  [id number] [facility] [address]        Dear Colleague,    Thank you for referring your patient, Wenceslao Hutson, to the [facility]. Please see a copy of my visit note below.    Sports Medicine Clinic Visit    PCP: Keke Randle    Wenceslao Hutson is a 38 year old male who is seen  in consultation at the request of  Roland Hernandez M.D. presenting with neck, chest and left arm pain.    Patient states he is here to obtain an MRI \"of his entire body\"  Did have an injury years ago where he states he tore a muscle lifting weights.    Does have tingling into his index, long and ring fingers.    HX of pectoralis repair.   Patient is right hand dominant.     **  Cervical pain, medial scapula, and pain that radiates down entire left arm. Tingling primarily in left index, long, and ring finger, occasionally has tingling in right index, long, and ring finger. Reports weakness in left arm with lifting objects, has been experiencing forearm pain while grasping items.   **  Reviewed note from Dr Benítez, [facility]; visit 11/19/18. See chart.    Injury: ongoing     Location of Pain: left upper extremity   Duration of Pain: 2+ year(s)  Rating of Pain at worst: 10/10  Rating of Pain Currently: 8/10  Symptoms are better with: Nothing  Symptoms are worse with: getting out of bed, movement  Additional Features:   Positive: paresthesias and weakness   Negative: swelling, bruising, popping, grinding, catching, locking, instability, pain in other joints and systemic symptoms  Other evaluation and/or treatments so far consists of: MRI  Prior History of related problems: ongoing, HX of pectoralis repair     Social History: construction     Review of Systems  Musculoskeletal: as above  Remainder of review of systems is negative including constitutional, CV, pulmonary, GI, Skin and Neurologic except as noted in HPI or medical history.    Past " "Medical History:   Diagnosis Date     Chronic tonsillitis      Past Surgical History:   Procedure Laterality Date     SHOULDER SURGERY  2007-Left     TONSILLECTOMY  12/22/2011    Procedure:TONSILLECTOMY; Tonsillectomy; Surgeon:NAA CASIANO; Location:[facility] OR     Family History   Problem Relation Age of Onset     Breast Cancer Mother      Social History     Socioeconomic History     Marital status: Single     Spouse name: Not on file     Number of children: 5     Years of education: Not on file     Highest education level: Not on file   Social Needs     Financial resource strain: Not on file     Food insecurity - worry: Not on file     Food insecurity - inability: Not on file     Transportation needs - medical: Not on file     Transportation needs - non-medical: Not on file   Occupational History     Occupation: Construction   Tobacco Use     Smoking status: Former Smoker     Years: 3.00     Types: Cigarettes     Start date: 6/15/2005     Smokeless tobacco: Former User     Quit date: 12/22/2011     Tobacco comment: smoke free household.   Substance and Sexual Activity     Alcohol use: Yes     Comment: on the weekends     Drug use: No     Sexual activity: Yes     Partners: Female   Other Topics Concern     Parent/sibling w/ CABG, MI or angioplasty before 65F 55M? Not Asked   Social History Narrative     Not on file       This document serves as a record of the services and decisions personally performed and made by DO MARCI Hatch. It was created on their behalf by Chintan Lay, a trained medical scribe. The creation of this document is based the provider's statements to the medical scribe.    Chintan Lay December 19, 2018 10:11 AM    Objective  /82   Ht 1.626 m (5' 4\")   Wt 92.5 kg (204 lb)   BMI 35.02 kg/m         GENERAL APPEARANCE: healthy, alert and no distress   GAIT: NORMAL  SKIN: no suspicious lesions or rashes  NEURO: Normal strength and tone, mentation intact and speech normal  PSYCH:  " mentation appears normal and affect normal/bright  HEENT: no scleral icterus  CV: no extremity edema  RESP: nonlabored breathing         Cervical Spine Exam    Range of Motion:       forward flexion limited due to pain         extension limited by pain in upper back/neck        lateral flexion to right limited by pain in left periscapular, upper thoracic, points near T3 level        Lateral flexion to left limited by pain in left periscapular, again points near T3 level  Significant limitation with extension, mod limitation lateral bending  Radiating pain to left LE with left lateral bending and mild with extension    Inspection:       No visible deformity        normal lordotic curvature maintained    Strength:     C6 (elbow flexion) asymmetric 3/5 on left        C8 (finger abduction, thumb flexion) asymmetric reduced on left         strength asymmetric, reduced on left        Thumb opposition reduced on left with forearm pain  Elbow extension 4/5 left    Reflexes:      C5 (biceps) symmetric normal       C6 (supinator) symmetric normal       C7 (triceps) symmetric normal    Special Tests:     Unable to perform Spurling due to pain   Radiating pain reproduced with attempt    Skin:     well perfused       capillary refill brisk    Lymphatics:      no edema noted in the upper extremities     Left Elbow Exam:  Tender: cubital fossa    Radial tunnel Tinel mild positive      Hand/Wrist Exam (Left)  Tender: mildly over carpal tunnel     Left Shoulder exam    Skin:       well perfused       capillary refill brisk       Surgical scars noted on left shoulder from pectoral muscle tear repair      Radiology  Visualized MRI of the left shoulder taken on 9/15/2018, and reviewed images and report with patient.  MRI demonstrates findings below.     MR LEFT SHOULDER WITHOUT CONTRAST  9/15/2018 8:31 AM     HISTORY: Status post pectoralis muscle tear with repair in 2012. Now  with shoulder pain. Evaluate for recurrent pectoralis  major tear and  rotator cuff tear.     TECHNIQUE: Oblique coronal T1, T2 and T2 fat suppressed images,  oblique sagittal T2 and axial proton density and T2 weighted images  were obtained.      COMPARISON: MRI left shoulder 1/18/2012.     FINDINGS:  Osseous acromial outlet: Interval development of mild hypertrophic  degenerative change at the acromioclavicular joint which does not  directly impinge on the supraspinatus. Distal acromial morphology is  type II with near neutral slope on oblique sagittal images and minimal  lateral downsloping on oblique coronal images. There is a trace amount  of fluid and some edema related to the subacromial/subdeltoid bursa,  new since the prior exam.     Rotator cuff: The supraspinatus, infraspinatus, teres minor, and  subscapularis muscles and tendons are normal.     Labrum: Again seen is irregularity and intrasubstance increased signal  as well as increased signal at the base of the anterior inferior  labrum suggesting a tear/detachment. This could be better evaluated  with MR arthrogram of clinically indicated. The appearance is similar  to the prior exam.     Biceps tendon: The biceps tendon is normal proximally at the biceps  anchor and distally in the bicipital groove.       Osseous structures and cartilaginous surfaces: No acute bony  abnormalities. Articular cartilages of the glenohumeral joint are  normal.     Additional findings: Tracks for sutures or other nonmetallic fixation  devices in the proximal humeral diaphysis related to known prior  pectoralis major tendon tear repair. This area is seen on the coronal  and sagittal images and is associated with some chronic hypertrophic  thickening of the anterior cortex. However, this area is incompletely  included on the axial images and is difficult to assess for the status  of the pectoralis repair. If this region needs to be evaluated, the  patient could return for additional imaging of the proximal arm.                                                                       IMPRESSION:   1. No rotator cuff pathology.  2. Mild hypertrophic degenerative change at the acromioclavicular  joint. Trace amount of fluid/edema related to the  subacromial/subdeltoid bursa could represent bursitis.  3. Abnormal-appearing anterior inferior labrum, similar to the prior  exam. This could be better evaluated with MR arthrogram if clinically  indicated.  4. Postoperative changes in the proximal humeral diaphysis consistent  with known pectoralis insertion repair. However, this area is not  adequately evaluated since the axial images do not include this  region.     MANSOOR CASILLAS MD    ==================================  Visualized radiographs of left shoulder taken 9/11/2018, and reviewed the images with the patient.  Impression: no acute abnormality.     XR SHOULDER LT G/E 3 VW 9/11/2018 4:29 PM     HISTORY: Pain.     COMPARISON: January 13, 2012.                                                                       IMPRESSION: No evidence of acute fracture or malalignment.      CAIO GROSS MD    Assessment:  1. Cervicalgia    2. Left hand paresthesia        Plan:  Discussed the assessment with the patient. Prior shoulder issues noted, with history of pec tendon tear, subsequent repair, and then re-tear. Current issues appear more neurologic; suspect cervical spine source, radiculopathy. Additional considerations peripheral source like CTS, also TOS.    Radiologic images reviewed and discussed with patient today   We discussed the following: symptom treatment, activity modification/rest, medication, imaging, rehab, electrodiagnostic testing, and injection therapy.     Following discussion, plan:   Topical Treatments: Ice or Heat as needed   Over the counter medication: Patient's preferred OTC medication as needed.  MRI of the cervical spine ordered. Significant neck pain with limited ROM, with symptoms to left UE with motion.  Activity  Modification: as discussed  Future consideration for EMG of the LUE, corticosteroid injection, and/or Physical Therapy referral pending MRI results. Possible STONE.  Follow up: will call with MRI results  We discussed potentially concerning signs and symptoms related to the condition(s) listed above, including increase in pain, changing pain, and the patient was instructed to seek appropriate medical care if noted. All questions answered to patient's satisfaction. The patient expressed understanding of the plan.      George Mackenzie DO, MARCI    CC: Roland Hernandez      Disclaimer: This note consists of symbols derived from keyboarding, dictation and/or voice recognition software. As a result, there may be errors in the script that have gone undetected. Please consider this when interpreting information found in this chart.    The information in this document, created by the medical scribe for me, accurately reflects the services I personally performed and the decisions made by me. I have reviewed and approved this document for accuracy prior to leaving the patient care area.       Again, thank you for allowing me to participate in the care of your patient.        Sincerely,        George Mackenzie DO

## 2018-12-19 NOTE — PROGRESS NOTES
"Sports Medicine Clinic Visit    PCP: Keke Randle Haresh is a 38 year old male who is seen  in consultation at the request of  Roland Hernandez M.D. presenting with neck, chest and left arm pain.    Patient states he is here to obtain an MRI \"of his entire body\"  Did have an injury years ago where he states he tore a muscle lifting weights.    Does have tingling into his index, long and ring fingers.    HX of pectoralis repair.   Patient is right hand dominant.     **  Cervical pain, medial scapula, and pain that radiates down entire left arm. Tingling primarily in left index, long, and ring finger, occasionally has tingling in right index, long, and ring finger. Reports weakness in left arm with lifting objects, has been experiencing forearm pain while grasping items.   **  Reviewed note from Dr Benítez, [facility]; visit 11/19/18. See chart.    Injury: ongoing     Location of Pain: left upper extremity   Duration of Pain: 2+ year(s)  Rating of Pain at worst: 10/10  Rating of Pain Currently: 8/10  Symptoms are better with: Nothing  Symptoms are worse with: getting out of bed, movement  Additional Features:   Positive: paresthesias and weakness   Negative: swelling, bruising, popping, grinding, catching, locking, instability, pain in other joints and systemic symptoms  Other evaluation and/or treatments so far consists of: MRI  Prior History of related problems: ongoing, HX of pectoralis repair     Social History: construction     Review of Systems  Musculoskeletal: as above  Remainder of review of systems is negative including constitutional, CV, pulmonary, GI, Skin and Neurologic except as noted in HPI or medical history.    Past Medical History:   Diagnosis Date     Chronic tonsillitis      Past Surgical History:   Procedure Laterality Date     SHOULDER SURGERY  2007-Left     TONSILLECTOMY  12/22/2011    Procedure:TONSILLECTOMY; Tonsillectomy; Surgeon:NAA CASIANO; Location: [facility] " "    Family History   Problem Relation Age of Onset     Breast Cancer Mother      Social History     Socioeconomic History     Marital status: Single     Spouse name: Not on file     Number of children: 5     Years of education: Not on file     Highest education level: Not on file   Social Needs     Financial resource strain: Not on file     Food insecurity - worry: Not on file     Food insecurity - inability: Not on file     Transportation needs - medical: Not on file     Transportation needs - non-medical: Not on file   Occupational History     Occupation: Construction   Tobacco Use     Smoking status: Former Smoker     Years: 3.00     Types: Cigarettes     Start date: 6/15/2005     Smokeless tobacco: Former User     Quit date: 12/22/2011     Tobacco comment: smoke free household.   Substance and Sexual Activity     Alcohol use: Yes     Comment: on the weekends     Drug use: No     Sexual activity: Yes     Partners: Female   Other Topics Concern     Parent/sibling w/ CABG, MI or angioplasty before 65F 55M? Not Asked   Social History Narrative     Not on file       This document serves as a record of the services and decisions personally performed and made by DO MARCI Hatch. It was created on their behalf by Chintan Lay, a trained medical scribe. The creation of this document is based the provider's statements to the medical scribe.    Chintan Lay December 19, 2018 10:11 AM    Objective  /82   Ht 1.626 m (5' 4\")   Wt 92.5 kg (204 lb)   BMI 35.02 kg/m        GENERAL APPEARANCE: healthy, alert and no distress   GAIT: NORMAL  SKIN: no suspicious lesions or rashes  NEURO: Normal strength and tone, mentation intact and speech normal  PSYCH:  mentation appears normal and affect normal/bright  HEENT: no scleral icterus  CV: no extremity edema  RESP: nonlabored breathing         Cervical Spine Exam    Range of Motion:       forward flexion limited due to pain         extension limited by pain in upper " back/neck        lateral flexion to right limited by pain in left periscapular, upper thoracic, points near T3 level        Lateral flexion to left limited by pain in left periscapular, again points near T3 level  Significant limitation with extension, mod limitation lateral bending  Radiating pain to left LE with left lateral bending and mild with extension    Inspection:       No visible deformity        normal lordotic curvature maintained    Strength:     C6 (elbow flexion) asymmetric 3/5 on left        C8 (finger abduction, thumb flexion) asymmetric reduced on left         strength asymmetric, reduced on left        Thumb opposition reduced on left with forearm pain  Elbow extension 4/5 left    Reflexes:      C5 (biceps) symmetric normal       C6 (supinator) symmetric normal       C7 (triceps) symmetric normal    Special Tests:     Unable to perform Spurling due to pain   Radiating pain reproduced with attempt    Skin:     well perfused       capillary refill brisk    Lymphatics:      no edema noted in the upper extremities     Left Elbow Exam:  Tender: cubital fossa    Radial tunnel Tinel mild positive      Hand/Wrist Exam (Left)  Tender: mildly over carpal tunnel     Left Shoulder exam    Skin:       well perfused       capillary refill brisk       Surgical scars noted on left shoulder from pectoral muscle tear repair      Radiology  Visualized MRI of the left shoulder taken on 9/15/2018, and reviewed images and report with patient.  MRI demonstrates findings below.     MR LEFT SHOULDER WITHOUT CONTRAST  9/15/2018 8:31 AM     HISTORY: Status post pectoralis muscle tear with repair in 2012. Now  with shoulder pain. Evaluate for recurrent pectoralis major tear and  rotator cuff tear.     TECHNIQUE: Oblique coronal T1, T2 and T2 fat suppressed images,  oblique sagittal T2 and axial proton density and T2 weighted images  were obtained.      COMPARISON: MRI left shoulder 1/18/2012.     FINDINGS:  Osseous  acromial outlet: Interval development of mild hypertrophic  degenerative change at the acromioclavicular joint which does not  directly impinge on the supraspinatus. Distal acromial morphology is  type II with near neutral slope on oblique sagittal images and minimal  lateral downsloping on oblique coronal images. There is a trace amount  of fluid and some edema related to the subacromial/subdeltoid bursa,  new since the prior exam.     Rotator cuff: The supraspinatus, infraspinatus, teres minor, and  subscapularis muscles and tendons are normal.     Labrum: Again seen is irregularity and intrasubstance increased signal  as well as increased signal at the base of the anterior inferior  labrum suggesting a tear/detachment. This could be better evaluated  with MR arthrogram of clinically indicated. The appearance is similar  to the prior exam.     Biceps tendon: The biceps tendon is normal proximally at the biceps  anchor and distally in the bicipital groove.       Osseous structures and cartilaginous surfaces: No acute bony  abnormalities. Articular cartilages of the glenohumeral joint are  normal.     Additional findings: Tracks for sutures or other nonmetallic fixation  devices in the proximal humeral diaphysis related to known prior  pectoralis major tendon tear repair. This area is seen on the coronal  and sagittal images and is associated with some chronic hypertrophic  thickening of the anterior cortex. However, this area is incompletely  included on the axial images and is difficult to assess for the status  of the pectoralis repair. If this region needs to be evaluated, the  patient could return for additional imaging of the proximal arm.                                                                      IMPRESSION:   1. No rotator cuff pathology.  2. Mild hypertrophic degenerative change at the acromioclavicular  joint. Trace amount of fluid/edema related to the  subacromial/subdeltoid bursa could  represent bursitis.  3. Abnormal-appearing anterior inferior labrum, similar to the prior  exam. This could be better evaluated with MR arthrogram if clinically  indicated.  4. Postoperative changes in the proximal humeral diaphysis consistent  with known pectoralis insertion repair. However, this area is not  adequately evaluated since the axial images do not include this  region.     MANSOOR CASILLAS MD    ==================================  Visualized radiographs of left shoulder taken 9/11/2018, and reviewed the images with the patient.  Impression: no acute abnormality.     XR SHOULDER LT G/E 3 VW 9/11/2018 4:29 PM     HISTORY: Pain.     COMPARISON: January 13, 2012.                                                                       IMPRESSION: No evidence of acute fracture or malalignment.      CAIO GROSS MD    Assessment:  1. Cervicalgia    2. Left hand paresthesia        Plan:  Discussed the assessment with the patient. Prior shoulder issues noted, with history of pec tendon tear, subsequent repair, and then re-tear. Current issues appear more neurologic; suspect cervical spine source, radiculopathy. Additional considerations peripheral source like CTS, also TOS.    Radiologic images reviewed and discussed with patient today   We discussed the following: symptom treatment, activity modification/rest, medication, imaging, rehab, electrodiagnostic testing, and injection therapy.     Following discussion, plan:   Topical Treatments: Ice or Heat as needed   Over the counter medication: Patient's preferred OTC medication as needed.  MRI of the cervical spine ordered. Significant neck pain with limited ROM, with symptoms to left UE with motion.  Activity Modification: as discussed  Future consideration for EMG of the LUE, corticosteroid injection, and/or Physical Therapy referral pending MRI results. Possible STONE.  Follow up: will call with MRI results  We discussed potentially concerning signs and symptoms  related to the condition(s) listed above, including increase in pain, changing pain, and the patient was instructed to seek appropriate medical care if noted. All questions answered to patient's satisfaction. The patient expressed understanding of the plan.      George Mackenzie DO, CAZEKE    CC: Roland Hernandez      Disclaimer: This note consists of symbols derived from keyboarding, dictation and/or voice recognition software. As a result, there may be errors in the script that have gone undetected. Please consider this when interpreting information found in this chart.    The information in this document, created by the medical scribe for me, accurately reflects the services I personally performed and the decisions made by me. I have reviewed and approved this document for accuracy prior to leaving the patient care area.

## 2018-12-19 NOTE — PATIENT INSTRUCTIONS
With neck pain, limited motion in your neck, and the tingling in your left hand, I suspect this is related to a nerve getting pinched in your neck.    MRI ordered to look more closely at the cervical spine (neck).       Advanced imaging is done by appointment. Some insurance require a prior authorization to be completed which may delay the time until you are able to schedule your appointment. Please call [place] [place] [facility] [place]: [phone] to schedule your MRI.  Depending on your availability you can usually schedule within the next 1-2 days.    The clinic will call you with results, if you have not heard from the clinic within 3-4 days following your MRI please contact us at the number listed below.     If you have any further questions for your physician or physician s care team you can call [phone] and use option 3 to leave a voice message. Calls received during business hours will be returned same day.

## 2018-12-27 ENCOUNTER — ANCILLARY PROCEDURE (OUTPATIENT)
Dept: MRI IMAGING | Facility: CLINIC | Age: 38
End: 2018-12-27
Attending: PEDIATRICS
Payer: COMMERCIAL

## 2018-12-27 DIAGNOSIS — R20.2 LEFT HAND PARESTHESIA: ICD-10-CM

## 2018-12-27 DIAGNOSIS — M54.2 CERVICALGIA: ICD-10-CM

## 2018-12-27 PROCEDURE — 72141 MRI NECK SPINE W/O DYE: CPT | Mod: TC

## 2018-12-28 ENCOUNTER — TELEPHONE (OUTPATIENT)
Dept: ORTHOPEDICS | Facility: CLINIC | Age: 38
End: 2018-12-28

## 2018-12-28 DIAGNOSIS — M54.2 CERVICALGIA: Primary | ICD-10-CM

## 2018-12-28 DIAGNOSIS — R20.2 LEFT HAND PARESTHESIA: ICD-10-CM

## 2018-12-28 NOTE — TELEPHONE ENCOUNTER
Results for orders placed or performed in visit on 12/27/18   MRI Cervical spine w/o contrast    Narrative    MRI CERVICAL SPINE WITHOUT CONTRAST 12/27/2018 2:54 PM     HISTORY: Left hand paresthesias.    TECHNIQUE: Multiplanar, multisequence MRI of the cervical spine  without contrast.     COMPARISON: None.    FINDINGS: The cervical spinal cord and visualized portions of the  thoracic spinal cord appear normal. The visualized portions of the  brainstem and cerebellum appear normal. Moderate motion artifact.    Alignment: Normal.    Craniocervical Junction and C1-C2: Normal.    C2-C3: Normal disc, facet joints, spinal canal and neural foramina.    C3-C4: Minimal annular bulge. No central or lateral stenosis. No  significant facet joint disease.    C4-C5: Minimal annular bulge. No central or lateral stenosis. No facet  joint disease.    C5-C6: Minimal annular bulge. No central or lateral stenosis. No facet  joint disease.    C6-C7: Normal disc, facet joints, spinal canal and neural foramina.    C7-T1: Normal disc, facet joints, spinal canal and neural foramina.    T1-T2: Normal disc, facet joints, spinal canal and neural foramina.     T2-T3: Normal disc, facet joints, spinal canal and neural foramina.    Paraspinous Soft Tissues: Normal as visualized.    Bone Marrow: Normal signal intensity.      Impression    IMPRESSION:   1. Mild annular bulges as described C3-C4 through C5-C6.  2. Exam otherwise negative.     MAGI HOGUE MD

## 2019-01-02 NOTE — TELEPHONE ENCOUNTER
Mild disc bulging, no clear evidence for nerve getting pinched (no stenosis).  For neck pain, advise PT.  For left UE issues, would offer Edx testing. Refer for EMG and then recheck in clinic for results.  Thanks.  George Mackenzie, DO, CAQ

## 2019-01-02 NOTE — TELEPHONE ENCOUNTER
Called patient and discussed results.  He is in agreement to EMG as well as PT.  Referrals placed.  He will follow up in clinic following the EMG to discuss results.    Alma Watson MS ATC

## 2019-01-11 ENCOUNTER — TRANSFERRED RECORDS (OUTPATIENT)
Dept: HEALTH INFORMATION MANAGEMENT | Facility: CLINIC | Age: 39
End: 2019-01-11

## 2019-01-18 ENCOUNTER — TELEPHONE (OUTPATIENT)
Dept: ORTHOPEDICS | Facility: CLINIC | Age: 39
End: 2019-01-18

## 2019-01-18 DIAGNOSIS — R20.2 LEFT HAND PARESTHESIA: ICD-10-CM

## 2019-01-18 DIAGNOSIS — S29.011S PECTORALIS MUSCLE RUPTURE, SEQUELA: ICD-10-CM

## 2019-01-18 DIAGNOSIS — M54.2 CERVICALGIA: Primary | ICD-10-CM

## 2019-01-18 NOTE — TELEPHONE ENCOUNTER
RADHA EMG received from [facility] 1/11/19    Results:  Normal study      Results placed in physician bin for review      Alma Watson MS ATC

## 2019-01-22 NOTE — TELEPHONE ENCOUNTER
Discussed with Dr. Mackenzie.  Normal EMG,  Recommend PT.    Called and spoke with patient.  Gave him results and recommendations.  He was in agreement with PT.  Order placed, will follow up in 4 weeks in not improving.    Alma Watson MS ATC

## 2019-01-22 NOTE — TELEPHONE ENCOUNTER
Patient calling stating he is looking for the results of previous tests done at [facility]. Please advise.

## 2019-04-24 ENCOUNTER — OFFICE VISIT (OUTPATIENT)
Dept: FAMILY MEDICINE | Facility: CLINIC | Age: 39
End: 2019-04-24
Payer: COMMERCIAL

## 2019-04-24 VITALS
OXYGEN SATURATION: 98 % | TEMPERATURE: 97.2 F | WEIGHT: 200 LBS | HEART RATE: 114 BPM | SYSTOLIC BLOOD PRESSURE: 116 MMHG | DIASTOLIC BLOOD PRESSURE: 74 MMHG | BODY MASS INDEX: 34.33 KG/M2

## 2019-04-24 DIAGNOSIS — E11.69 HYPERLIPIDEMIA ASSOCIATED WITH TYPE 2 DIABETES MELLITUS (H): ICD-10-CM

## 2019-04-24 DIAGNOSIS — E78.5 HYPERLIPIDEMIA ASSOCIATED WITH TYPE 2 DIABETES MELLITUS (H): ICD-10-CM

## 2019-04-24 DIAGNOSIS — E11.65 TYPE 2 DIABETES MELLITUS WITH HYPERGLYCEMIA, UNSPECIFIED WHETHER LONG TERM INSULIN USE (H): Primary | ICD-10-CM

## 2019-04-24 DIAGNOSIS — I10 ESSENTIAL HYPERTENSION: ICD-10-CM

## 2019-04-24 DIAGNOSIS — R19.6 HALITOSIS: ICD-10-CM

## 2019-04-24 LAB
CREAT UR-MCNC: 43 MG/DL
HBA1C MFR BLD: 11.2 % (ref 0–5.6)
MICROALBUMIN UR-MCNC: 9 MG/L
MICROALBUMIN/CREAT UR: 19.93 MG/G CR (ref 0–17)

## 2019-04-24 PROCEDURE — 99214 OFFICE O/P EST MOD 30 MIN: CPT | Performed by: FAMILY MEDICINE

## 2019-04-24 PROCEDURE — 82043 UR ALBUMIN QUANTITATIVE: CPT | Performed by: FAMILY MEDICINE

## 2019-04-24 PROCEDURE — 36415 COLL VENOUS BLD VENIPUNCTURE: CPT | Performed by: FAMILY MEDICINE

## 2019-04-24 PROCEDURE — 83036 HEMOGLOBIN GLYCOSYLATED A1C: CPT | Performed by: FAMILY MEDICINE

## 2019-04-24 RX ORDER — ROSUVASTATIN CALCIUM 10 MG/1
10 TABLET, COATED ORAL DAILY
Qty: 90 TABLET | Refills: 3 | Status: SHIPPED | OUTPATIENT
Start: 2019-04-24 | End: 2019-10-23

## 2019-04-24 RX ORDER — INSULIN GLARGINE 100 [IU]/ML
15 INJECTION, SOLUTION SUBCUTANEOUS DAILY
Qty: 15 ML | Refills: 0 | Status: SHIPPED | OUTPATIENT
Start: 2019-04-24 | End: 2020-02-11

## 2019-04-24 RX ORDER — AMLODIPINE BESYLATE 5 MG/1
5 TABLET ORAL DAILY
Qty: 90 TABLET | Refills: 1 | Status: SHIPPED | OUTPATIENT
Start: 2019-04-24 | End: 2019-10-23

## 2019-04-24 NOTE — PROGRESS NOTES
SUBJECTIVE:   Wenceslao Hutson is a 38 year old male who presents to clinic today for the following   health issues:    ED/UC Followup:    Facility:  [facility]  Date of visit: 4/19/19  Reason for visit: Hyperglycemia  Current Status: Not feeling any better     HPI:  Has had DM 5-6 yrs  Never stays on medications for more than 4 more  Out of work but hopes to return soon.  Takes Metformin daily; had not taken insulin for over 1 yrs; did not need it  When not working: blood sugars go up    Diabetes Follow-up      Patient is checking blood sugars: not at all    Diabetic concerns: blood sugar frequently over 200     Symptoms of hypoglycemia (low blood sugar): none     Paresthesias (numbness or burning in feet) or sores: No     Date of last diabetic eye exam: Due    Diabetes Management Resources    Hyperlipidemia Follow-Up      Rate your low fat/cholesterol diet?: not monitoring fat    Taking statin?  Yes, no muscle aches from statin    Other lipid medications/supplements?:  none    Hypertension Follow-up      Outpatient blood pressures are not being checked.    Low Salt Diet: no added salt    BP Readings from Last 2 Encounters:   04/24/19 116/74   12/19/18 128/82     Hemoglobin A1C (%)   Date Value   04/24/2019 11.2 (H)   11/30/2018 5.7 (H)     LDL Cholesterol Calculated (mg/dL)   Date Value   08/09/2018 70   04/19/2018     Cannot estimate LDL when triglyceride exceeds 400 mg/dL     LDL Cholesterol Direct (mg/dL)   Date Value   04/19/2018 127 (H)     PROBLEMS TO ADD ON...    Additional history: as documented    Tobacco  Allergies  Meds  Med Hx  Surg Hx  Fam Hx  Soc Hx      Reviewed and updated as needed this visit by Provider    Patient Active Problem List   Diagnosis     CARDIOVASCULAR SCREENING; LDL GOAL LESS THAN 160     Hyperlipidemia associated with type 2 diabetes mellitus (H)     Type 2 diabetes mellitus without complication, with long-term current use of insulin (H)     Hyperlipidemia LDL goal <100      Essential hypertension     Uncontrolled diabetes mellitus type 2 without complications, unspecified long term insulin use status     Class 1 obesity due to excess calories with serious comorbidity and body mass index (BMI) of 34.0 to 34.9 in adult     Past Surgical History:   Procedure Laterality Date     SHOULDER SURGERY  2007-Left     TONSILLECTOMY  12/22/2011    Procedure:TONSILLECTOMY; Tonsillectomy; Surgeon:NAA CASIANO; Location: OR       Social History     Tobacco Use     Smoking status: Former Smoker     Years: 3.00     Types: Cigarettes     Start date: 6/15/2005     Smokeless tobacco: Former User     Quit date: 12/22/2011     Tobacco comment: smoke free household.   Substance Use Topics     Alcohol use: Yes     Comment: on the weekends     Family History   Problem Relation Age of Onset     Breast Cancer Mother          ROS:  Constitutional, HEENT, cardiovascular, pulmonary, gi and gu systems are negative, except as otherwise noted.    OBJECTIVE:     /74   Pulse 114   Temp 97.2  F (36.2  C) (Oral)   Wt 90.7 kg (200 lb)   SpO2 98%   BMI 34.33 kg/m    Body mass index is 34.33 kg/m .  GENERAL: healthy, alert and no distress  NECK: no adenopathy,  and thyroid normal to palpation  RESP: lungs clear to auscultation - no rales, rhonchi or wheezes  CV: regular rate and rhythm, no murmur, click or rub, no peripheral edema  ABDOMEN: soft, nontender, no masses and bowel sounds normal  MS: no gross musculoskeletal defects noted, no edema    Diagnostic Test Results:  none     ASSESSMENT/PLAN:     (E11.65) Type 2 diabetes mellitus with hyperglycemia, unspecified whether long term insulin use (H)  (primary encounter diagnosis)  Comment:  Went over the nature of diabetes and its complications. Will start insulin, discussed blood sugar monitoring and treatment with insulin.  Went over co morbidities, need for good blood sugar control as well as BP and Cholesterol control.  Discussed the recheck schedule  and follow up with diabetes educator.    Plan: insulin glargine (BASAGLAR KWIKPEN) 100 UNIT/ML        pen, aspirin (ASA) 81 MG tablet, Hemoglobin         A1c, Albumin Random Urine Quantitative with         Creat Ratio, blood glucose (NO BRAND SPECIFIED)        test strip, blood glucose monitoring (NO BRAND         SPECIFIED) meter device kit, blood glucose (NO         BRAND SPECIFIED) lancets standard, insulin         aspart (NOVOLOG PEN) 100 UNIT/ML pen, DIABETES         EDUCATOR REFERRAL    (E11.69,  E78.5) Hyperlipidemia associated with type 2 diabetes mellitus (H)  Comment: LDL at goal  Plan: rosuvastatin (CRESTOR) 10 MG tablet    (I10) Essential hypertension  Comment: BP well controlled. Amlodipine.  Plan: amLODIPine (NORVASC) 5 MG tablet    (R19.6) Halitosis  Comment: Had dental exam and no cause determined.  Plan: Discuss evaluation at next visit    Follow up in 1 month or sooner with concerns    Casey Sheldon MD  [facility]

## 2019-04-29 ENCOUNTER — OFFICE VISIT (OUTPATIENT)
Dept: FAMILY MEDICINE | Facility: CLINIC | Age: 39
End: 2019-04-29
Payer: COMMERCIAL

## 2019-04-29 VITALS
DIASTOLIC BLOOD PRESSURE: 78 MMHG | OXYGEN SATURATION: 98 % | WEIGHT: 200 LBS | SYSTOLIC BLOOD PRESSURE: 128 MMHG | HEART RATE: 92 BPM | TEMPERATURE: 96.6 F | BODY MASS INDEX: 34.33 KG/M2

## 2019-04-29 DIAGNOSIS — J32.0 CHRONIC MAXILLARY SINUSITIS: ICD-10-CM

## 2019-04-29 DIAGNOSIS — R19.6 HALITOSIS: ICD-10-CM

## 2019-04-29 DIAGNOSIS — E11.65 TYPE 2 DIABETES MELLITUS WITH HYPERGLYCEMIA, WITH LONG-TERM CURRENT USE OF INSULIN (H): Primary | ICD-10-CM

## 2019-04-29 DIAGNOSIS — Z79.4 TYPE 2 DIABETES MELLITUS WITH HYPERGLYCEMIA, WITH LONG-TERM CURRENT USE OF INSULIN (H): Primary | ICD-10-CM

## 2019-04-29 PROCEDURE — 99214 OFFICE O/P EST MOD 30 MIN: CPT | Performed by: FAMILY MEDICINE

## 2019-04-29 NOTE — PROGRESS NOTES
SUBJECTIVE:   Wenceslao Hutson is a 38 year old male who presents to clinic today for the following   health issues:    Chief Complaint   Patient presents with     Diabetes     1 week follow up     Diabetes Follow-up      Patient is checking blood sugars: not at all    Diabetic concerns: None and blood sugar frequently over 200     Symptoms of hypoglycemia (low blood sugar): none     Paresthesias (numbness or burning in feet) or sores: No     Date of last diabetic eye exam: Referral given recently    BP Readings from Last 2 Encounters:   04/29/19 128/78   04/24/19 116/74     Hemoglobin A1C (%)   Date Value   04/24/2019 11.2 (H)   11/30/2018 5.7 (H)     LDL Cholesterol Calculated (mg/dL)   Date Value   08/09/2018 70   04/19/2018     Cannot estimate LDL when triglyceride exceeds 400 mg/dL     LDL Cholesterol Direct (mg/dL)   Date Value   04/19/2018 127 (H)     Not gotten any medication, pharmacy told him they were waiting on his insurance.    Halitosis:    Since was little    Has gotten worse; smells like sulfur all the time.    He was seen dentist: did not find anything.     Sinusitis:    Also has had some sinus issue.    Tobacco  Allergies  Meds  Med Hx  Surg Hx  Fam Hx  Soc Hx      Reviewed and updated as needed this visit by Provider    Patient Active Problem List   Diagnosis     CARDIOVASCULAR SCREENING; LDL GOAL LESS THAN 160     Hyperlipidemia associated with type 2 diabetes mellitus (H)     Type 2 diabetes mellitus without complication, with long-term current use of insulin (H)     Hyperlipidemia LDL goal <100     Essential hypertension     Uncontrolled diabetes mellitus type 2 without complications, unspecified long term insulin use status     Class 1 obesity due to excess calories with serious comorbidity and body mass index (BMI) of 34.0 to 34.9 in adult     Past Surgical History:   Procedure Laterality Date     SHOULDER SURGERY  2007-Left     TONSILLECTOMY  12/22/2011    Procedure:TONSILLECTOMY;  Tonsillectomy; Surgeon:NAA CASIANO; Location: OR       Social History     Tobacco Use     Smoking status: Former Smoker     Years: 3.00     Types: Cigarettes     Start date: 6/15/2005     Smokeless tobacco: Former User     Quit date: 12/22/2011     Tobacco comment: smoke free household.   Substance Use Topics     Alcohol use: Yes     Comment: on the weekends     Family History   Problem Relation Age of Onset     Breast Cancer Mother          ROS:  Constitutional, HEENT, cardiovascular, pulmonary, gi and gu systems are negative, except as otherwise noted.    OBJECTIVE:     /78   Pulse 92   Temp 96.6  F (35.9  C) (Oral)   Wt 90.7 kg (200 lb)   SpO2 98%   BMI 34.33 kg/m    Body mass index is 34.33 kg/m .  GENERAL: healthy, alert and no distress  GI: No obvious murmur from his breath  EYES: Eyes grossly normal to inspection, PERRL and conjunctivae and sclerae normal  HENT: ear canals and TM's normal, nose and mouth without ulcers or lesions  NECK: no adenopathy and thyroid normal to palpation  RESP: lungs clear to auscultation - no rales, rhonchi or wheezes  CV: regular rate and rhythm,  no murmur, click or rub, no peripheral edema   MS: no gross musculoskeletal defects noted, no edema    Diagnostic Test Results:  none     ASSESSMENT/PLAN:     (E11.65,  Z79.4) Type 2 diabetes mellitus with hyperglycemia, with long-term current use of insulin (H)  (primary encounter diagnosis)  Comment: Patient reports that he has not gotten his medications due to insurance issues.  Discussed referral to care coordination to see if there could be resources he can use to get his medications  Plan: CARE COORDINATION REFERRAL    (R19.6) Halitosis  Comment: Discussed today the pathophysiology of halitosis, causes including sinus, dental, respiratory and GI causes.  He had a dental evaluation recently but was not thought to be the cause.  Given that he has ongoing sinus issues we will treat this as he is very  helps.    (J32.0) Chronic maxillary sinusitis  Comment: Antibiotic treatment  Plan: amoxicillin-clavulanate (AUGMENTIN) 875-125 MG         Tablet    Follow up in 3 weeks or sooner with concerns  More than 50% of the time spent with patient on counseling on halitosis/ coordinating his care. Total appointment times was about 30 minutes.   Casey Sheldon MD

## 2019-04-30 ENCOUNTER — PATIENT OUTREACH (OUTPATIENT)
Dept: CARE COORDINATION | Facility: CLINIC | Age: 39
End: 2019-04-30

## 2019-04-30 NOTE — PROGRESS NOTES
Clinic Care Coordination Contact 4/30/19  [facility]-    Clinical Data: Care Coordinator Outreach to connect with pt about insurance resources and prescription affordability.  Outreach attempted x 1.  Left message on [employer] with call back information and requested return call.  Plan:  Care Coordinator will try to reach patient again in 3-5 business days.    Ashley Chung MARK  [facility] -Care Coordination  Lida Shah  [phone]  4/30/2019 9:18 AM

## 2019-05-03 ENCOUNTER — PATIENT OUTREACH (OUTPATIENT)
Dept: CARE COORDINATION | Facility: CLINIC | Age: 39
End: 2019-05-03

## 2019-05-03 NOTE — PROGRESS NOTES
Clinic Care Coordination Contact 5/3/19  Care Team Conversations-SW    Referral received to assist the pt with insurance concerns as well as prescription affordability. Pt explained that he does have insurance through his employer, but he has not yet received the card. He said that he is not able to cover his medications with his insurance as he doesn't have a card for the pharmacy to run.    Pt explained that he has a prescription for insulin that he may have difficulty paying for if he needs to fill it before the card arrives in the mail. I provided him with the prescription assistance line for him to call and inquire if he would be eligible for any of their programs.     No more SW needs at this time. Please re-refer should needs arise.    NAIMA Torrez  [facility] -Care Coordination  Lida Shah  [phone]  5/3/2019 3:43 PM

## 2019-06-06 ENCOUNTER — TELEPHONE (OUTPATIENT)
Dept: FAMILY MEDICINE | Facility: CLINIC | Age: 39
End: 2019-06-06

## 2019-06-06 DIAGNOSIS — E11.65 TYPE 2 DIABETES MELLITUS WITH HYPERGLYCEMIA, UNSPECIFIED WHETHER LONG TERM INSULIN USE (H): ICD-10-CM

## 2019-06-06 NOTE — LETTER
June 6, 2019          Wenceslao Hutson,  [address]  [facility] [place] [facility] [address]        Dear Wenceslao Hutson      Monitoring and managing your preventative and chronic health conditions are very important to us. Our records indicate that you have not scheduled for Annual physical exam.     If you have received your health care elsewhere, please call the clinic so the information can be documented in your chart.    Please call [phone] or message us through your [employer] account to schedule an appointment or provide information for your chart.     Feel free to contact us if you have any questions or concerns!    I look forward to seeing you and working with you on your health care needs.     Sincerely,       Your [facility] Team

## 2019-06-06 NOTE — TELEPHONE ENCOUNTER
Prior Authorization Retail Medication Request    Medication/Dose: insulin aspart (NOVOLOG PEN) 100 UNIT/ML pen  ICD code (if different than what is on RX):  Type 2 diabetes mellitus with hyperglycemia, unspecified whether long term insulin use (H) [E11.65]  - Primary   Previously Tried and Failed:    Rationale:  Plan does not cover this medication    Insurance Name:  ANNELISE [[id number]]  Insurance ID:  [id number]      Pharmacy Information (if different than what is on RX)  Name:  [facility]  Phone:  [phone]

## 2019-06-06 NOTE — TELEPHONE ENCOUNTER
Panel Management Review      Patient has the following on his problem list:     Diabetes    ASA: Passed    Last A1C  Lab Results   Component Value Date    A1C 11.2 04/24/2019    A1C 5.7 11/30/2018    A1C 5.6 08/09/2018    A1C 11.9 04/19/2018    A1C 7.4 02/22/2018     A1C tested: FAILED    Last LDL:    Lab Results   Component Value Date    CHOL 134 08/09/2018     Lab Results   Component Value Date    HDL 42 08/09/2018     Lab Results   Component Value Date    LDL 70 08/09/2018     Lab Results   Component Value Date    TRIG 109 08/09/2018     Lab Results   Component Value Date    CHOLHDLRATIO 7.6 03/21/2014     Lab Results   Component Value Date    NHDL 92 08/09/2018       Is the patient on a Statin? YES             Is the patient on Aspirin? YES    Medications     HMG CoA Reductase Inhibitors     rosuvastatin (CRESTOR) 10 MG tablet       Salicylates     aspirin (ASA) 81 MG tablet             Last three blood pressure readings:  BP Readings from Last 3 Encounters:   04/29/19 128/78   04/24/19 116/74   12/19/18 128/82       Date of last diabetes office visit: 4/29/2019     Tobacco History:     History   Smoking Status     Former Smoker     Years: 3.00     Types: Cigarettes     Start date: 6/15/2005   Smokeless Tobacco     Former User     Quit date: 12/22/2011     Comment: smoke free household.         Hypertension   Last three blood pressure readings:  BP Readings from Last 3 Encounters:   04/29/19 128/78   04/24/19 116/74   12/19/18 128/82     Blood pressure: Passed    HTN Guidelines:  Less than 140/90      Composite cancer screening  Chart review shows that this patient is due/due soon for the following None  Summary:    Patient is due/failing the following:   Eye exam and PHYSICAL    Action needed:   Patient needs office visit for Annual Physical Exam.    Type of outreach:    Sent letter.    Questions for provider review:    None                                                                                                                                     EM Arteaga       Chart routed to none .

## 2019-06-12 NOTE — TELEPHONE ENCOUNTER
PRIOR AUTHORIZATION DENIED    Medication: insulin aspart (NOVOLOG PEN) 100 UNIT/ML pen-DENIED    Denial Date: 6/11/2019    Denial Rational: Patient must have tried/failed one formulary, has tried none.  Formulary: Admelog SoloStar or Vial        Appeal Information:     If provider would like to appeal please provide a letter of medical necessity stating why formulary alternatives would not be clinically appropriate for patient and route back to the PA team.

## 2019-06-12 NOTE — TELEPHONE ENCOUNTER
Central Prior Authorization Team   Phone: [phone]      PA Initiation    Medication: insulin aspart (NOVOLOG PEN) 100 UNIT/ML pen-  Insurance Company: [employer] - Phone [phone] Fax [phone]  Pharmacy Filling the Rx: [facility] [address] [place] [address] [address] AT [facility] [address]  Filling Pharmacy Phone: [phone]  Filling Pharmacy Fax:    Start Date: 6/12/2019

## 2019-06-12 NOTE — TELEPHONE ENCOUNTER
JUAN LUIS Ivy please advise if you would Like to do an Appeal, change medication or patient pay out of pocket.  Poonam Napier,

## 2019-06-14 NOTE — TELEPHONE ENCOUNTER
Detailed message left on patient's VM with note as written below.  RN hotline number [phone] given for patient to call back with any questions    Devon Capone RN

## 2019-10-22 ENCOUNTER — OFFICE VISIT (OUTPATIENT)
Dept: FAMILY MEDICINE | Facility: CLINIC | Age: 39
End: 2019-10-22
Payer: COMMERCIAL

## 2019-10-22 VITALS
RESPIRATION RATE: 16 BRPM | OXYGEN SATURATION: 98 % | HEIGHT: 64 IN | DIASTOLIC BLOOD PRESSURE: 80 MMHG | WEIGHT: 193 LBS | SYSTOLIC BLOOD PRESSURE: 112 MMHG | BODY MASS INDEX: 32.95 KG/M2 | HEART RATE: 85 BPM | TEMPERATURE: 98.3 F

## 2019-10-22 DIAGNOSIS — E11.65 TYPE 2 DIABETES MELLITUS WITH HYPERGLYCEMIA, UNSPECIFIED WHETHER LONG TERM INSULIN USE (H): ICD-10-CM

## 2019-10-22 DIAGNOSIS — Z23 NEED FOR PROPHYLACTIC VACCINATION AND INOCULATION AGAINST INFLUENZA: ICD-10-CM

## 2019-10-22 DIAGNOSIS — T78.40XD ALLERGIC STATE, SUBSEQUENT ENCOUNTER: ICD-10-CM

## 2019-10-22 DIAGNOSIS — E04.9 THYROID ENLARGED: ICD-10-CM

## 2019-10-22 DIAGNOSIS — E78.5 HYPERLIPIDEMIA ASSOCIATED WITH TYPE 2 DIABETES MELLITUS (H): ICD-10-CM

## 2019-10-22 DIAGNOSIS — M54.2 NECK PAIN: Primary | ICD-10-CM

## 2019-10-22 DIAGNOSIS — J02.9 SORE THROAT: ICD-10-CM

## 2019-10-22 DIAGNOSIS — I10 ESSENTIAL HYPERTENSION: ICD-10-CM

## 2019-10-22 DIAGNOSIS — E11.69 HYPERLIPIDEMIA ASSOCIATED WITH TYPE 2 DIABETES MELLITUS (H): ICD-10-CM

## 2019-10-22 LAB
ANION GAP SERPL CALCULATED.3IONS-SCNC: 7 MMOL/L (ref 3–14)
BASOPHILS # BLD AUTO: 0 10E9/L (ref 0–0.2)
BASOPHILS NFR BLD AUTO: 0.2 %
BUN SERPL-MCNC: 14 MG/DL (ref 7–30)
CALCIUM SERPL-MCNC: 9 MG/DL (ref 8.5–10.1)
CHLORIDE SERPL-SCNC: 107 MMOL/L (ref 94–109)
CHOLEST SERPL-MCNC: 154 MG/DL
CO2 SERPL-SCNC: 24 MMOL/L (ref 20–32)
CREAT SERPL-MCNC: 0.75 MG/DL (ref 0.66–1.25)
DIFFERENTIAL METHOD BLD: NORMAL
EOSINOPHIL # BLD AUTO: 0.1 10E9/L (ref 0–0.7)
EOSINOPHIL NFR BLD AUTO: 1.6 %
ERYTHROCYTE [DISTWIDTH] IN BLOOD BY AUTOMATED COUNT: 12.7 % (ref 10–15)
GFR SERPL CREATININE-BSD FRML MDRD: >90 ML/MIN/{1.73_M2}
GLUCOSE SERPL-MCNC: 90 MG/DL (ref 70–99)
HBA1C MFR BLD: 5.2 % (ref 0–5.6)
HCT VFR BLD AUTO: 44 % (ref 40–53)
HDLC SERPL-MCNC: 43 MG/DL
HGB BLD-MCNC: 14.4 G/DL (ref 13.3–17.7)
LDLC SERPL CALC-MCNC: 88 MG/DL
LYMPHOCYTES # BLD AUTO: 1.4 10E9/L (ref 0.8–5.3)
LYMPHOCYTES NFR BLD AUTO: 33 %
MCH RBC QN AUTO: 30.8 PG (ref 26.5–33)
MCHC RBC AUTO-ENTMCNC: 32.7 G/DL (ref 31.5–36.5)
MCV RBC AUTO: 94 FL (ref 78–100)
MONOCYTES # BLD AUTO: 0.6 10E9/L (ref 0–1.3)
MONOCYTES NFR BLD AUTO: 14.2 %
NEUTROPHILS # BLD AUTO: 2.2 10E9/L (ref 1.6–8.3)
NEUTROPHILS NFR BLD AUTO: 51 %
NONHDLC SERPL-MCNC: 111 MG/DL
PLATELET # BLD AUTO: 215 10E9/L (ref 150–450)
POTASSIUM SERPL-SCNC: 3.9 MMOL/L (ref 3.4–5.3)
RBC # BLD AUTO: 4.68 10E12/L (ref 4.4–5.9)
SODIUM SERPL-SCNC: 138 MMOL/L (ref 133–144)
TRIGL SERPL-MCNC: 114 MG/DL
TSH SERPL DL<=0.005 MIU/L-ACNC: 0.93 MU/L (ref 0.4–4)
WBC # BLD AUTO: 4.4 10E9/L (ref 4–11)

## 2019-10-22 PROCEDURE — 80048 BASIC METABOLIC PNL TOTAL CA: CPT | Performed by: FAMILY MEDICINE

## 2019-10-22 PROCEDURE — 90686 IIV4 VACC NO PRSV 0.5 ML IM: CPT | Performed by: FAMILY MEDICINE

## 2019-10-22 PROCEDURE — 80061 LIPID PANEL: CPT | Performed by: FAMILY MEDICINE

## 2019-10-22 PROCEDURE — 90471 IMMUNIZATION ADMIN: CPT | Performed by: FAMILY MEDICINE

## 2019-10-22 PROCEDURE — 36415 COLL VENOUS BLD VENIPUNCTURE: CPT | Performed by: FAMILY MEDICINE

## 2019-10-22 PROCEDURE — 83036 HEMOGLOBIN GLYCOSYLATED A1C: CPT | Performed by: FAMILY MEDICINE

## 2019-10-22 PROCEDURE — 85025 COMPLETE CBC W/AUTO DIFF WBC: CPT | Performed by: FAMILY MEDICINE

## 2019-10-22 PROCEDURE — 99214 OFFICE O/P EST MOD 30 MIN: CPT | Mod: 25 | Performed by: FAMILY MEDICINE

## 2019-10-22 PROCEDURE — 84443 ASSAY THYROID STIM HORMONE: CPT | Performed by: FAMILY MEDICINE

## 2019-10-22 RX ORDER — NAPROXEN 500 MG/1
500 TABLET ORAL 2 TIMES DAILY WITH MEALS
Qty: 14 TABLET | Refills: 0 | Status: SHIPPED | OUTPATIENT
Start: 2019-10-22 | End: 2019-10-25

## 2019-10-22 ASSESSMENT — MIFFLIN-ST. JEOR: SCORE: 1701.44

## 2019-10-22 NOTE — PROGRESS NOTES
Subjective     Wenceslao Hutson is a 39 year old male who presents to clinic today for the following health issues:    Sore throat    In the left side. No fever or chills.    Worse with swallowing especially solids.    Seen in ER about 2 weeks ago, strep was negative.    Radiates down the neck.    -  Pain is in the left side of the neck   Feels like a little swollen    Diabetes:   He presents for follow up of diabetes.  He is not checking blood glucose. When his blood glucose is low, the patient is asymptomatic for confusion, blurred vision, lethargy and reports not feeling dizzy, shaky, or weak.  He has no concerns regarding his diabetes at this time.  He is not experiencing numbness or burning in feet, excessive thirst, blurry vision, weight changes or redness, sores or blisters on feet. The patient has not had a diabetic eye exam in the last 12 months.     Diabetes Management Resources    Hypertension: He presents for follow up of hypertension.  He does not check blood pressure  regularly outside of the clinic.  He follows a low salt diet.   History of Present Illness        Diabetes:   He presents for follow up of diabetes.  He is not checking blood glucose. When his blood glucose is low, the patient is asymptomatic for confusion, blurred vision, lethargy and reports not feeling dizzy, shaky, or weak.  He has no concerns regarding his diabetes at this time.  He is not experiencing numbness or burning in feet, excessive thirst, blurry vision, weight changes or redness, sores or blisters on feet. The patient has not had a diabetic eye exam in the last 12 months.     Diabetes Management Resources    Hyperlipidemia:  He presents for follow up of hyperlipidemia.  He is taking medication to lower cholesterol. He is not having myalgia or other side effects to statin medications.He is not reporting shortness of breath, increased sweating or nausea with activity, left-sided neck or arm pain, chest pain or pressure, or pain  "in calves when walking 1-2 blocks.    Hypertension: He presents for follow up of hypertension.  He does not check blood pressure  regularly outside of the clinic.  He follows a low salt diet.     He eats 0-1 servings of fruits and vegetables daily.He consumes 0 sweetened beverage(s) daily.He is missing 2 dose(s) of medications per week.  He is not taking prescribed medications regularly due to remembering to take.    Reviewed and updated as needed this visit by Provider    Review of Systems   ROS COMP: Constitutional, HEENT, cardiovascular, pulmonary, gi and gu systems are negative, except as otherwise noted.      Objective    /80   Pulse 85   Temp 98.3  F (36.8  C) (Oral)   Resp 16   Ht 1.626 m (5' 4\")   Wt 87.5 kg (193 lb)   SpO2 98%   BMI 33.13 kg/m    Body mass index is 33.13 kg/m .  Physical Exam   GENERAL: healthy, alert and mild distress  EYES: Eyes grossly normal to inspection, PERRL and conjunctivae and sclerae normal  HENT: Mild erythema in oropharynx  NECK: Tenderness in the left submandibular area, no obvious adenopathy, diffuse tender swelling over the thyroid gland trevin left lobe    RESP: lungs clear to auscultation - no rales, rhonchi or wheezes  CV: regular rate and rhythm, normal S1 S2, no S3 or S4, no murmur  MS: no gross musculoskeletal defects noted, no edema  NEURO: Normal strength and tone, mentation intact and speech normal    Diagnostic Test Results:  Labs reviewed in Epic  Results for orders placed or performed in visit on 10/22/19   Hemoglobin A1c   Result Value Ref Range    Hemoglobin A1C 5.2 0 - 5.6 %   BASIC METABOLIC PANEL   Result Value Ref Range    Sodium 138 133 - 144 mmol/L    Potassium 3.9 3.4 - 5.3 mmol/L    Chloride 107 94 - 109 mmol/L    Carbon Dioxide 24 20 - 32 mmol/L    Anion Gap 7 3 - 14 mmol/L    Glucose 90 70 - 99 mg/dL    Urea Nitrogen 14 7 - 30 mg/dL    Creatinine 0.75 0.66 - 1.25 mg/dL    GFR Estimate >90 >60 mL/min/[1.73_m2]    GFR Estimate If Black >90 >60 " mL/min/[1.73_m2]    Calcium 9.0 8.5 - 10.1 mg/dL   Lipid panel reflex to direct LDL Fasting   Result Value Ref Range    Cholesterol 154 <200 mg/dL    Triglycerides 114 <150 mg/dL    HDL Cholesterol 43 >39 mg/dL    LDL Cholesterol Calculated 88 <100 mg/dL    Non HDL Cholesterol 111 <130 mg/dL   TSH with free T4 reflex   Result Value Ref Range    TSH 0.93 0.40 - 4.00 mU/L   CBC with platelets differential   Result Value Ref Range    WBC 4.4 4.0 - 11.0 10e9/L    RBC Count 4.68 4.4 - 5.9 10e12/L    Hemoglobin 14.4 13.3 - 17.7 g/dL    Hematocrit 44.0 40.0 - 53.0 %    MCV 94 78 - 100 fl    MCH 30.8 26.5 - 33.0 pg    MCHC 32.7 31.5 - 36.5 g/dL    RDW 12.7 10.0 - 15.0 %    Platelet Count 215 150 - 450 10e9/L    % Neutrophils 51.0 %    % Lymphocytes 33.0 %    % Monocytes 14.2 %    % Eosinophils 1.6 %    % Basophils 0.2 %    Absolute Neutrophil 2.2 1.6 - 8.3 10e9/L    Absolute Lymphocytes 1.4 0.8 - 5.3 10e9/L    Absolute Monocytes 0.6 0.0 - 1.3 10e9/L    Absolute Eosinophils 0.1 0.0 - 0.7 10e9/L    Absolute Basophils 0.0 0.0 - 0.2 10e9/L    Diff Method Automated Method      Assessment & Plan     Wenceslao was seen today for diabetes, hypertension, lipids, refill request, flu shot and imm/inj.    Diagnoses and all orders for this visit:    Neck pain  -     CBC with platelets differential  -     US Thyroid with Soft Tissue Head and Neck; Future  -     naproxen (NAPROSYN) 500 MG tablet; Take 1 tablet (500 mg) by mouth 2 times daily (with meals) for 7 days    Sore throat      -    Likely viral. CBC normal.    Type 2 diabetes mellitus with hyperglycemia, unspecified whether long term insulin use (H)  -     Hemoglobin A1c  -     BASIC METABOLIC PANEL  -     Lipid panel reflex to direct LDL Fasting  -     TSH with free T4 reflex  -     metFORMIN (GLUCOPHAGE) 1000 MG tablet; Take 1 tablet (1,000 mg) by mouth 2 times daily (with meals)    Essential hypertension  -     amLODIPine (NORVASC) 5 MG tablet; Take 1 tablet (5 mg) by mouth  "daily    Thyroid enlarged  -     TSH with free T4 reflex  -     CBC with platelets differential  -     US Thyroid with Soft Tissue Head and Neck; Future    Hyperlipidemia associated with type 2 diabetes mellitus (H)  -     rosuvastatin (CRESTOR) 10 MG tablet; Take 1 tablet (10 mg) by mouth daily    Allergic state, subsequent encounter  -     cetirizine (ZYRTEC) 10 MG tablet; Take 1 tablet (10 mg) by mouth every evening    Need for prophylactic vaccination and inoculation against influenza  -     INFLUENZA VACCINE IM > 6 MONTHS VALENT IIV4 [95662]  -     Vaccine Administration, Initial [10796]    BMI:   Estimated body mass index is 33.13 kg/m  as calculated from the following:    Height as of this encounter: 1.626 m (5' 4\").    Weight as of this encounter: 87.5 kg (193 lb).   Weight management plan: Discussed healthy diet and exercise guidelines    Return in about 1 week (around 10/29/2019) for Follow up for symptoms recheck.    Casey Sheldon MD  [facility]    "

## 2019-10-23 DIAGNOSIS — T78.40XD ALLERGIC STATE, SUBSEQUENT ENCOUNTER: ICD-10-CM

## 2019-10-23 DIAGNOSIS — E11.69 HYPERLIPIDEMIA ASSOCIATED WITH TYPE 2 DIABETES MELLITUS (H): ICD-10-CM

## 2019-10-23 DIAGNOSIS — E11.65 TYPE 2 DIABETES MELLITUS WITH HYPERGLYCEMIA, UNSPECIFIED WHETHER LONG TERM INSULIN USE (H): ICD-10-CM

## 2019-10-23 DIAGNOSIS — M54.2 NECK PAIN: ICD-10-CM

## 2019-10-23 DIAGNOSIS — E78.5 HYPERLIPIDEMIA ASSOCIATED WITH TYPE 2 DIABETES MELLITUS (H): ICD-10-CM

## 2019-10-23 DIAGNOSIS — I10 ESSENTIAL HYPERTENSION: ICD-10-CM

## 2019-10-23 RX ORDER — ROSUVASTATIN CALCIUM 10 MG/1
10 TABLET, COATED ORAL DAILY
Qty: 90 TABLET | Refills: 3 | Status: SHIPPED | OUTPATIENT
Start: 2019-10-23 | End: 2019-12-23

## 2019-10-23 RX ORDER — CETIRIZINE HYDROCHLORIDE 10 MG/1
10 TABLET ORAL EVERY EVENING
Qty: 90 TABLET | Refills: 3 | Status: SHIPPED | OUTPATIENT
Start: 2019-10-23 | End: 2019-10-25

## 2019-10-23 RX ORDER — AMLODIPINE BESYLATE 5 MG/1
5 TABLET ORAL DAILY
Qty: 90 TABLET | Refills: 1 | Status: SHIPPED | OUTPATIENT
Start: 2019-10-23 | End: 2019-10-25

## 2019-10-23 NOTE — TELEPHONE ENCOUNTER
Reason for Call:  Other prescription    Detailed comments: metFORMIN, wants all medication he's had to be refilled except the insulin.    Phone Number Patient can be reached at: Cell number on file:    Telephone Information:   Mobile [phone]       Best Time: any    Can we leave a detailed message on this number? YES    Call taken on 10/23/2019 at 10:27 AM by Leandro Lane

## 2019-10-25 RX ORDER — CETIRIZINE HYDROCHLORIDE 10 MG/1
10 TABLET ORAL EVERY EVENING
Qty: 90 TABLET | Refills: 3 | Status: SHIPPED | OUTPATIENT
Start: 2019-10-25 | End: 2021-03-10

## 2019-10-25 RX ORDER — NAPROXEN 500 MG/1
500 TABLET ORAL 2 TIMES DAILY PRN
Qty: 14 TABLET | Refills: 0 | Status: SHIPPED | OUTPATIENT
Start: 2019-10-25 | End: 2021-01-11

## 2019-10-25 RX ORDER — AMLODIPINE BESYLATE 5 MG/1
5 TABLET ORAL DAILY
Qty: 90 TABLET | Refills: 2 | Status: SHIPPED | OUTPATIENT
Start: 2019-10-25 | End: 2019-12-23

## 2019-10-25 NOTE — TELEPHONE ENCOUNTER
Routing refill request to provider for review/approval because:  Patient was given prescription for naproxen with directions to take for 7 days  Labs not current, no ALT or AST    Eulalia Luis RN

## 2019-10-26 ENCOUNTER — ANCILLARY PROCEDURE (OUTPATIENT)
Dept: ULTRASOUND IMAGING | Facility: CLINIC | Age: 39
End: 2019-10-26
Attending: FAMILY MEDICINE
Payer: COMMERCIAL

## 2019-10-26 DIAGNOSIS — E04.9 THYROID ENLARGED: ICD-10-CM

## 2019-10-26 DIAGNOSIS — M54.2 NECK PAIN: ICD-10-CM

## 2019-10-26 PROCEDURE — 76536 US EXAM OF HEAD AND NECK: CPT

## 2019-10-28 PROBLEM — E78.5 HYPERLIPIDEMIA ASSOCIATED WITH TYPE 2 DIABETES MELLITUS (H): Status: RESOLVED | Noted: 2017-07-10 | Resolved: 2019-10-28

## 2019-10-28 PROBLEM — E11.69 HYPERLIPIDEMIA ASSOCIATED WITH TYPE 2 DIABETES MELLITUS (H): Status: RESOLVED | Noted: 2017-07-10 | Resolved: 2019-10-28

## 2019-12-16 ENCOUNTER — TELEPHONE (OUTPATIENT)
Dept: FAMILY MEDICINE | Facility: CLINIC | Age: 39
End: 2019-12-16

## 2019-12-16 NOTE — TELEPHONE ENCOUNTER
Please contact pt to find out which medications are needing refills, pend prescriptions, then route to RN refill. Thanks.    Emilia Mota RN  [facility]

## 2019-12-16 NOTE — TELEPHONE ENCOUNTER
Reason for Call:  Other prescription    Detailed comments: Pt calling, wants all of his medication refilled except the two insulin on file.    [email or web address] [phone]    Phone Number Patient can be reached at: Cell number on file:    Telephone Information:   Mobile [phone]       Best Time: any    Can we leave a detailed message on this number? YES    Call taken on 12/16/2019 at 9:21 AM by Leandro Lane

## 2019-12-16 NOTE — TELEPHONE ENCOUNTER
Called patient and left detailed VM to have patient return call. Patient should contact pharmacy for refills first. Okay to speak to anyone on purple team.  Emilia MCCRARY CMA ([facility])

## 2019-12-17 NOTE — TELEPHONE ENCOUNTER
2nd attempt. Called patient and left detailed VM to have patient return call. Patient should contact pharmacy for refills first. Okay to speak to anyone on purple team.  Tami Lane CMA on 12/17/2019 at 8:52 AM

## 2019-12-17 NOTE — TELEPHONE ENCOUNTER
Called pharmacy patient has refill on file for all medications and testing supplies. Pharmacy states that they did not receive a refill request from patient. Please have patient contact his pharmacy for refills. Karen Rivera MA

## 2019-12-23 ENCOUNTER — ANCILLARY PROCEDURE (OUTPATIENT)
Dept: GENERAL RADIOLOGY | Facility: CLINIC | Age: 39
End: 2019-12-23
Attending: FAMILY MEDICINE
Payer: COMMERCIAL

## 2019-12-23 ENCOUNTER — OFFICE VISIT (OUTPATIENT)
Dept: FAMILY MEDICINE | Facility: CLINIC | Age: 39
End: 2019-12-23
Payer: COMMERCIAL

## 2019-12-23 VITALS
DIASTOLIC BLOOD PRESSURE: 96 MMHG | HEART RATE: 105 BPM | WEIGHT: 183 LBS | OXYGEN SATURATION: 96 % | SYSTOLIC BLOOD PRESSURE: 142 MMHG | BODY MASS INDEX: 31.41 KG/M2 | TEMPERATURE: 100.2 F

## 2019-12-23 DIAGNOSIS — I10 ESSENTIAL HYPERTENSION: ICD-10-CM

## 2019-12-23 DIAGNOSIS — E78.5 HYPERLIPIDEMIA ASSOCIATED WITH TYPE 2 DIABETES MELLITUS (H): ICD-10-CM

## 2019-12-23 DIAGNOSIS — R19.6 BAD BREATH: ICD-10-CM

## 2019-12-23 DIAGNOSIS — M25.512 CHRONIC LEFT SHOULDER PAIN: Primary | ICD-10-CM

## 2019-12-23 DIAGNOSIS — E11.69 HYPERLIPIDEMIA ASSOCIATED WITH TYPE 2 DIABETES MELLITUS (H): ICD-10-CM

## 2019-12-23 DIAGNOSIS — G89.29 CHRONIC LEFT SHOULDER PAIN: Primary | ICD-10-CM

## 2019-12-23 DIAGNOSIS — R63.4 WEIGHT LOSS: ICD-10-CM

## 2019-12-23 DIAGNOSIS — E11.65 TYPE 2 DIABETES MELLITUS WITH HYPERGLYCEMIA, UNSPECIFIED WHETHER LONG TERM INSULIN USE (H): ICD-10-CM

## 2019-12-23 PROCEDURE — 73030 X-RAY EXAM OF SHOULDER: CPT | Mod: LT

## 2019-12-23 PROCEDURE — 99214 OFFICE O/P EST MOD 30 MIN: CPT | Performed by: FAMILY MEDICINE

## 2019-12-23 PROCEDURE — 71046 X-RAY EXAM CHEST 2 VIEWS: CPT

## 2019-12-23 RX ORDER — ROSUVASTATIN CALCIUM 10 MG/1
10 TABLET, COATED ORAL DAILY
Qty: 90 TABLET | Refills: 1 | Status: SHIPPED | OUTPATIENT
Start: 2019-12-23 | End: 2020-02-11

## 2019-12-23 RX ORDER — AMLODIPINE BESYLATE 5 MG/1
5 TABLET ORAL DAILY
Qty: 90 TABLET | Refills: 1 | Status: SHIPPED | OUTPATIENT
Start: 2019-12-23 | End: 2020-02-11

## 2019-12-23 NOTE — PROGRESS NOTES
Subjective     Wenceslao Hutson is a 39 year old male who presents to clinic today for the following health issues:    HPI     Bad Breathe   Patient says has had this since childhood. He says others around him say his breath smells bad.   His mother also has a similar problem   Has seen dentist and told was not a dental problem.   Not related to any foods.   Denies any cough or chronic sinus issues     Left arm/shoulder x 7 yrs    Left shoulder pain ongoing and getting worse.   Gotten worse lately.  Pain is in the lateral aspect and the back  He works in construction.    Diabetes Follow-up      How often are you checking your blood sugar? Not at all    What concerns do you have today about your diabetes? None     Do you have any of these symptoms? (Select all that apply)  No numbness or tingling in feet.  No redness, sores or blisters on feet.  No complaints of excessive thirst.  No reports of blurry vision.  No significant changes to weight.     Have you had a diabetic eye exam in the last 12 months? No    Diabetes Management Resources    Hyperlipidemia Follow-Up      Are you regularly taking any medication or supplement to lower your cholesterol?   No    Are you having muscle aches or other side effects that you think could be caused by your cholesterol lowering medication?  No    Hypertension Follow-up      Do you check your blood pressure regularly outside of the clinic? No     Are you following a low salt diet? Yes    Are your blood pressures ever more than 140 on the top number (systolic) OR more   than 90 on the bottom number (diastolic), for example 140/90? No    BP Readings from Last 2 Encounters:   10/22/19 112/80   04/29/19 128/78     Hemoglobin A1C (%)   Date Value   10/22/2019 5.2   04/24/2019 11.2 (H)     LDL Cholesterol Calculated (mg/dL)   Date Value   10/22/2019 88   08/09/2018 70     Weight Loss;   He does not eat lunch, works through the day; works 12-14 hrs a day  Wt Readings from Last 10  Encounters:   12/23/19 83 kg (183 lb)   10/22/19 87.5 kg (193 lb)   04/29/19 90.7 kg (200 lb)   04/24/19 90.7 kg (200 lb)   12/19/18 92.5 kg (204 lb)   11/30/18 92.8 kg (204 lb 9.6 oz)   11/19/18 92.1 kg (203 lb)   10/09/18 90.3 kg (199 lb)   09/06/18 90.3 kg (199 lb)   08/09/18 87.1 kg (192 lb)     Patient Active Problem List   Diagnosis     Type 2 diabetes mellitus without complication, with long-term current use of insulin (H)     Hyperlipidemia LDL goal <100     Essential hypertension     Class 1 obesity due to excess calories with serious comorbidity and body mass index (BMI) of 34.0 to 34.9 in adult     Past Surgical History:   Procedure Laterality Date     SHOULDER SURGERY  2007-Left     TONSILLECTOMY  12/22/2011    Procedure:TONSILLECTOMY; Tonsillectomy; Surgeon:NAA CASIANO; Location: OR       Social History     Tobacco Use     Smoking status: Former Smoker     Years: 3.00     Types: Cigarettes     Start date: 6/15/2005     Smokeless tobacco: Former User     Quit date: 12/22/2011     Tobacco comment: smoke free household.   Substance Use Topics     Alcohol use: Yes     Comment: on the weekends     Family History   Problem Relation Age of Onset     Breast Cancer Mother          ROS COMP: Constitutional, HEENT, cardiovascular, pulmonary, gi and gu systems are negative, except as otherwise noted.      Objective    There were no vitals taken for this visit.  There is no height or weight on file to calculate BMI.  Physical Exam   GENERAL: healthy, alert and no distress  HENT: ear canals and TM's normal, nose and mouth without ulcers or lesions  NECK: no adenopathy and thyroid normal to palpation  RESP: lungs clear to auscultation - no rales, rhonchi or wheezes  CV: regular rate and rhythm, normal S1 S2, no S3 or S4, no murmur, click or rub, no peripheral edema   ABDOMEN: soft, nontender, no masses and bowel sounds normal  MS: no gross musculoskeletal defects noted, no edema  NEURO: Normal strength and  tone, mentation intact and speech normal    Diagnostic Test Results:  Labs reviewed in Epic        Assessment & Plan     Wenceslao was seen today for diabetes.    Diagnoses and all orders for this visit:    Chronic left shoulder pain  -     Cancel: XR Chest 2 Views  -     XR Shoulder Left 2 Views  -     [facility] PT, HAND, AND CHIROPRACTIC REFERRAL; Future    Essential hypertension  -     amLODIPine (NORVASC) 5 MG tablet; Take 1 tablet (5 mg) by mouth daily    Weight loss        -   Due to decreased intake and increased physical activity. Ok with this change    Bad breath     Differentials discussed; dental, sinusitis, chronic respiratory infections and halitophobia. Has seen dentist and dental cause ruled out. Has not chronic cough or sinus symptoms and CXR is normal. No symptoms pointing towards GI causes. Halitophobia likely.  -     XR Chest 2 Views    Hyperlipidemia associated with type 2 diabetes mellitus (H)  -     rosuvastatin (CRESTOR) 10 MG tablet; Take 1 tablet (10 mg) by mouth daily    Type 2 diabetes mellitus with hyperglycemia, unspecified whether long term insulin use (H)  -     metFORMIN (GLUCOPHAGE) 1000 MG tablet; Take 1 tablet (1,000 mg) by mouth 2 times daily (with meals)      Return in about 3 months (around 3/23/2020) for Routine Visit.    Casey Sheldon MD  [facility] [place]      Call about bad breath ? GI

## 2020-02-11 ENCOUNTER — OFFICE VISIT (OUTPATIENT)
Dept: FAMILY MEDICINE | Facility: CLINIC | Age: 40
End: 2020-02-11
Payer: COMMERCIAL

## 2020-02-11 VITALS
WEIGHT: 172 LBS | HEART RATE: 80 BPM | SYSTOLIC BLOOD PRESSURE: 118 MMHG | DIASTOLIC BLOOD PRESSURE: 80 MMHG | OXYGEN SATURATION: 96 % | BODY MASS INDEX: 29.52 KG/M2 | TEMPERATURE: 99.5 F

## 2020-02-11 DIAGNOSIS — E78.5 HYPERLIPIDEMIA ASSOCIATED WITH TYPE 2 DIABETES MELLITUS (H): ICD-10-CM

## 2020-02-11 DIAGNOSIS — M25.512 CHRONIC LEFT SHOULDER PAIN: ICD-10-CM

## 2020-02-11 DIAGNOSIS — R14.0 BLOATING SYMPTOM: ICD-10-CM

## 2020-02-11 DIAGNOSIS — I10 HTN, GOAL BELOW 140/90: ICD-10-CM

## 2020-02-11 DIAGNOSIS — G89.29 CHRONIC LEFT SHOULDER PAIN: ICD-10-CM

## 2020-02-11 DIAGNOSIS — R19.6 HALITOSIS: ICD-10-CM

## 2020-02-11 DIAGNOSIS — E11.69 HYPERLIPIDEMIA ASSOCIATED WITH TYPE 2 DIABETES MELLITUS (H): ICD-10-CM

## 2020-02-11 DIAGNOSIS — K59.00 CONSTIPATION, UNSPECIFIED CONSTIPATION TYPE: ICD-10-CM

## 2020-02-11 DIAGNOSIS — M79.644 PAIN OF FINGER OF RIGHT HAND: ICD-10-CM

## 2020-02-11 DIAGNOSIS — E11.9 TYPE 2 DIABETES MELLITUS WITHOUT COMPLICATION, WITHOUT LONG-TERM CURRENT USE OF INSULIN (H): Primary | ICD-10-CM

## 2020-02-11 LAB — HBA1C MFR BLD: 5.6 % (ref 0–5.6)

## 2020-02-11 PROCEDURE — 99214 OFFICE O/P EST MOD 30 MIN: CPT | Performed by: FAMILY MEDICINE

## 2020-02-11 PROCEDURE — 83036 HEMOGLOBIN GLYCOSYLATED A1C: CPT | Performed by: FAMILY MEDICINE

## 2020-02-11 PROCEDURE — 36415 COLL VENOUS BLD VENIPUNCTURE: CPT | Performed by: FAMILY MEDICINE

## 2020-02-11 RX ORDER — LIDOCAINE 40 MG/G
1 CREAM TOPICAL
Qty: 1 TUBE | Refills: 0 | Status: SHIPPED | OUTPATIENT
Start: 2020-02-11 | End: 2021-01-11

## 2020-02-11 RX ORDER — ROSUVASTATIN CALCIUM 10 MG/1
10 TABLET, COATED ORAL DAILY
Qty: 90 TABLET | Refills: 1 | Status: SHIPPED | OUTPATIENT
Start: 2020-02-11 | End: 2021-03-10

## 2020-02-11 RX ORDER — AMLODIPINE BESYLATE 5 MG/1
5 TABLET ORAL DAILY
Qty: 90 TABLET | Refills: 1 | Status: SHIPPED | OUTPATIENT
Start: 2020-02-11 | End: 2021-03-10

## 2020-02-11 NOTE — PROGRESS NOTES
Subjective     Wenceslao Hutson is a 39 year old male who presents to clinic today for the following health issues:    HPI       Finger pain    4 th finger right hand, since Jan 5    Diabetes Follow-up    Not doing insulin; did not use at any time.      How often are you checking your blood sugar? Not at all    What concerns do you have today about your diabetes? None     Do you have any of these symptoms? (Select all that apply)  No numbness or tingling in feet.  No redness, sores or blisters on feet.  No complaints of excessive thirst.  No reports of blurry vision.  No significant changes to weight.    Have you had a diabetic eye exam in the last 12 months? No      Hyperlipidemia Follow-Up      Are you regularly taking any medication or supplement to lower your cholesterol?   No    Are you having muscle aches or other side effects that you think could be caused by your cholesterol lowering medication?  No    Hypertension Follow-up      Do you check your blood pressure regularly outside of the clinic? No     Are you following a low salt diet? Yes    Are your blood pressures ever more than 140 on the top number (systolic) OR more   than 90 on the bottom number (diastolic), for example 140/90? No    BP Readings from Last 2 Encounters:   02/11/20 118/80   12/23/19 (!) 142/96     Hemoglobin A1C (%)   Date Value   10/22/2019 5.2   04/24/2019 11.2 (H)     LDL Cholesterol Calculated (mg/dL)   Date Value   10/22/2019 88   08/09/2018 70         How many servings of fruits and vegetables do you eat daily?  4 or more    On average, how many sweetened beverages do you drink each day (Examples: soda, juice, sweet tea, etc.  Do NOT count diet or artificially sweetened beverages)?   0    How many days per week do you exercise enough to make your heart beat faster? 3 or less    How many minutes a day do you exercise enough to make your heart beat faster? 9 or less  How many days per week do you miss taking your medication? 1    What  makes it hard for you to take your medications?  remembering to take    Constipation    Thinks could be from diet    Halitosis:    Been on going for a long time.    Also reporting bloating sensation with lots of gas.    Lately been having some constipation      Review of Systems   Constitutional, HEENT, cardiovascular, pulmonary, gi and gu systems are negative, except as otherwise noted.      Objective    /80   Pulse 80   Temp 99.5  F (37.5  C) (Oral)   Wt 78 kg (172 lb)   SpO2 96%   BMI 29.52 kg/m    Body mass index is 29.52 kg/m .  Physical Exam   GENERAL: healthy, alert and no distress  NECK: no adenopathy and thyroid normal to palpation  RESP: lungs clear to auscultation - no rales, rhonchi or wheezes  CV: regular rate and rhythm, normal S1 S2, no S3 or S4, no murmur, click or rub, no peripheral edema   ABDOMEN: soft, nontender, no masses and bowel sounds normal  MS: $th finger with tenderness over lateral border of middle phalanx    Results for orders placed or performed in visit on 02/11/20   Hemoglobin A1c     Status: None   Result Value Ref Range    Hemoglobin A1C 5.6 0 - 5.6 %     Assessment & Plan     Wenceslao was seen today for diabetes and constipation.    Diagnoses and all orders for this visit:    Type 2 diabetes mellitus without complication, without long-term current use of insulin (H)   A1c at goal. Made lifestyle adjustments  -     metFORMIN (GLUCOPHAGE) 1000 MG tablet; Take 1 tablet (1,000 mg) by mouth 2 times daily (with meals)    Hyperlipidemia associated with type 2 diabetes mellitus (H)  -     rosuvastatin (CRESTOR) 10 MG tablet; Take 1 tablet (10 mg) by mouth daily    HTN, goal below 140/90  -     amLODIPine (NORVASC) 5 MG tablet; Take 1 tablet (5 mg) by mouth daily    Pain of finger of right hand    Localized pain; neuroma, FB. Local pain medication  -     lidocaine (LMX4) 4 % external cream; Apply 1 g topically once as needed for moderate pain (apply to finger)    Constipation,  unspecified constipation type  -     GASTROENTEROLOGY ADULT REF CONSULT ONLY    Halitosis           Not evident, could be psychogenic. Due bloating ill refer to GI for evaluation  -     GASTROENTEROLOGY ADULT REF CONSULT ONLY    Bloating symptom  -     GASTROENTEROLOGY ADULT REF CONSULT ONLY    Chronic left shoulder pain  -     [facility] PT, HAND, AND CHIROPRACTIC REFERRAL; Future    Other orders  -     Hemoglobin A1c    Return in about 6 months (around 8/11/2020) for Routine Visit.    Casey Sheldon MD  [facility]

## 2020-02-14 ENCOUNTER — TRANSFERRED RECORDS (OUTPATIENT)
Dept: HEALTH INFORMATION MANAGEMENT | Facility: CLINIC | Age: 40
End: 2020-02-14

## 2020-02-14 LAB
ALT SERPL-CCNC: 94 U/L (ref 0–78)
AST SERPL-CCNC: 39 U/L (ref 0–37)
CREAT SERPL-MCNC: 0.78 MG/DL (ref 0.7–1.3)
GLUCOSE SERPL-MCNC: 129 MG/DL (ref 74–100)
POTASSIUM SERPL-SCNC: 3.9 MMOL/L (ref 3.5–5.1)

## 2020-03-02 ENCOUNTER — HEALTH MAINTENANCE LETTER (OUTPATIENT)
Age: 40
End: 2020-03-02

## 2020-03-02 PROBLEM — R59.0 HILAR ADENOPATHY: Status: ACTIVE | Noted: 2020-03-02

## 2020-03-02 PROBLEM — R91.8 PULMONARY NODULES: Status: ACTIVE | Noted: 2020-03-02

## 2020-03-12 ENCOUNTER — TRANSFERRED RECORDS (OUTPATIENT)
Dept: HEALTH INFORMATION MANAGEMENT | Facility: CLINIC | Age: 40
End: 2020-03-12

## 2020-12-14 ENCOUNTER — HEALTH MAINTENANCE LETTER (OUTPATIENT)
Age: 40
End: 2020-12-14

## 2021-01-08 NOTE — PROGRESS NOTES
Assessment & Plan   Wenceslao is a 41 yo M with a hx of DM2, HTN, HPLD, pulmonary nodules (CT 12/2011 and 2/2020) , hilar adenopathy (incidental on CT by GI 2/2020), former smoker (quit 6/2005) here for follow up. He reports that he lost his job and hence insurance, he has been at home doing nothing which has been very stressful. As a result he has also gained a lot of weight, not been exercising and not been eating well; healthy foods are expensive.    Type 2 diabetes mellitus without complication, without long-term current use of insulin (H)    Patient insurance lapsed and not been able to follow up in a while. Got insurance and just started his medications.  - Albumin Random Urine Quantitative with Creat Ratio  - HEMOGLOBIN A1C  - FOOT EXAM  - EYE ADULT REFERRAL; Future  - blood glucose monitoring (NO BRAND SPECIFIED) meter device kit; Use to test blood sugar 1 times daily or as directed. Preferred blood glucose meter OR supplies to accompany: Blood Glucose Monitor Brands: per insurance.  - blood glucose (NO BRAND SPECIFIED) test strip; Use to test blood sugar 1 times daily or as directed. To accompany: Blood Glucose Monitor Brands: per insurance.  - blood glucose calibration (NO BRAND SPECIFIED) solution; To accompany: Blood Glucose Monitor Brands: per insurance.  - thin (NO BRAND SPECIFIED) lancets; Use with lanceting device. To accompany: Blood Glucose Monitor Brands: per insurance.  - alcohol swab prep pads; Use to swab area of injection/sydni as directed.    HTN, goal below 140/90  BP above goal. Restart medication. Check BP in 1-2 weeks  - BASIC METABOLIC PANEL    Hyperlipidemia LDL goal <100     Statin, healthy diet, regular exercise and weight loss  - Lipid panel reflex to direct LDL Fasting    Adjustment disorder with depressed mood    Feeling down and prefers to see a therapist  - MENTAL HEALTH REFERRAL  - Adult; Outpatient Treatment; Individual/Couples/Family/Group Therapy/Health Psychology; [facility]:  [facility] [phone]; We will contact you to schedule the appointment or please call with any questions    Need for prophylactic vaccination and inoculation against influenza  - INFLUENZA VACCINE IM > 6 MONTHS VALENT IIV4 [20210]    Pinworms  - albendazole (ALBENZA) 200 MG tablet; Take 2 tablets (400 mg) by mouth 2 times daily (with meals)    Return in about 2 weeks (around 1/25/2021) for Routine Visit, Follow up for BP recheck with ancillary.    Casey Sheldon MD  [facility] KEESHA Billy is a 40 year old who presents to clinic today for the following health issues:     HPI     He was laid off in May 2020, and insurance ran out and was not able to get medications. He has also gained a lot of weight. Now has insurance and started taking his medications 2 days ago    Diabetes Follow-up      How often are you checking your blood sugar? Not at all    What concerns do you have today about your diabetes? Blood sugar is often over 200     Do you have any of these symptoms? (Select all that apply)  Weight gain    Have you had a diabetic eye exam in the last 12 months? No    BP Readings from Last 2 Encounters:   01/11/21 (!) 142/110   02/11/20 118/80     Hemoglobin A1C (%)   Date Value   01/11/2021 6.6 (H)   02/11/2020 5.6     LDL Cholesterol Calculated (mg/dL)   Date Value   01/11/2021 129 (H)   10/22/2019 88     Weight:   Has gained weight since   Wt Readings from Last 4 Encounters:   01/11/21 97.1 kg (214 lb)   02/11/20 78 kg (172 lb)   12/23/19 83 kg (183 lb)   10/22/19 87.5 kg (193 lb)       How many servings of fruits and vegetables do you eat daily?  0-1    On average, how many sweetened beverages do you drink each day (Examples: soda, juice, sweet tea, etc.  Do NOT count diet or artificially sweetened beverages)?   0    How many days per week do you exercise enough to make your heart beat faster? 3 or less    How many minutes a day do you exercise enough  to make your heart beat faster? 9 or less    How many days per week do you miss taking your medication? 0    Review of Systems   HEENT, cardiovascular, pulmonary, gi and gu systems are negative, except as otherwise noted.      Objective    BP (!) 142/110 (BP Location: Right arm, Patient Position: Chair, Cuff Size: Adult Large)   Pulse 107   Temp 98.8  F (37.1  C) (Oral)   Wt 97.1 kg (214 lb)   SpO2 96%   BMI 36.73 kg/m    Body mass index is 36.73 kg/m .  Physical Exam   GENERAL: healthy, alert and no distress  NECK: no adenopathy and thyroid normal to palpation  RESP: lungs clear to auscultation - no rales, rhonchi or wheezes  CV: regular rate and rhythm, no murmur, click or rub, no peripheral edema   ABDOMEN: soft, nontender, no masses and bowel sounds normal  MS: no gross musculoskeletal defects noted, no edema  PSYCH: Alert, mentation and speech normal, affect flat    Diabetic foot exam: normal DP and PT pulses, no trophic changes or ulcerative lesions and normal sensory exam    Results for orders placed or performed in visit on 01/11/21   Albumin Random Urine Quantitative with Creat Ratio     Status: Abnormal   Result Value Ref Range    Creatinine Urine 443 mg/dL    Albumin Urine mg/L 88 mg/L    Albumin Urine mg/g Cr 19.95 (H) 0 - 17 mg/g Cr   HEMOGLOBIN A1C     Status: Abnormal   Result Value Ref Range    Hemoglobin A1C 6.6 (H) 0 - 5.6 %   BASIC METABOLIC PANEL     Status: Abnormal   Result Value Ref Range    Sodium 137 133 - 144 mmol/L    Potassium 3.7 3.4 - 5.3 mmol/L    Chloride 106 94 - 109 mmol/L    Carbon Dioxide 25 20 - 32 mmol/L    Anion Gap 6 3 - 14 mmol/L    Glucose 131 (H) 70 - 99 mg/dL    Urea Nitrogen 11 7 - 30 mg/dL    Creatinine 0.78 0.66 - 1.25 mg/dL    GFR Estimate >90 >60 mL/min/[1.73_m2]    GFR Estimate If Black >90 >60 mL/min/[1.73_m2]    Calcium 8.9 8.5 - 10.1 mg/dL   Lipid panel reflex to direct LDL Fasting     Status: Abnormal   Result Value Ref Range    Cholesterol 226 (H) <200  mg/dL    Triglycerides 303 (H) <150 mg/dL    HDL Cholesterol 36 (L) >39 mg/dL    LDL Cholesterol Calculated 129 (H) <100 mg/dL    Non HDL Cholesterol 190 (H) <130 mg/dL     Care gaps: prev, eye, foot, A1c, LDL, BMP, A1c, flu, phq2

## 2021-01-11 ENCOUNTER — OFFICE VISIT (OUTPATIENT)
Dept: FAMILY MEDICINE | Facility: CLINIC | Age: 41
End: 2021-01-11
Payer: COMMERCIAL

## 2021-01-11 VITALS
SYSTOLIC BLOOD PRESSURE: 142 MMHG | OXYGEN SATURATION: 96 % | BODY MASS INDEX: 36.73 KG/M2 | WEIGHT: 214 LBS | DIASTOLIC BLOOD PRESSURE: 110 MMHG | TEMPERATURE: 98.8 F | HEART RATE: 107 BPM

## 2021-01-11 DIAGNOSIS — E66.01 MORBID OBESITY (H): ICD-10-CM

## 2021-01-11 DIAGNOSIS — B80 PINWORMS: ICD-10-CM

## 2021-01-11 DIAGNOSIS — F43.21 ADJUSTMENT DISORDER WITH DEPRESSED MOOD: ICD-10-CM

## 2021-01-11 DIAGNOSIS — E11.9 TYPE 2 DIABETES MELLITUS WITHOUT COMPLICATION, WITHOUT LONG-TERM CURRENT USE OF INSULIN (H): Primary | ICD-10-CM

## 2021-01-11 DIAGNOSIS — I10 HTN, GOAL BELOW 140/90: ICD-10-CM

## 2021-01-11 DIAGNOSIS — Z23 NEED FOR PROPHYLACTIC VACCINATION AND INOCULATION AGAINST INFLUENZA: ICD-10-CM

## 2021-01-11 DIAGNOSIS — E78.5 HYPERLIPIDEMIA LDL GOAL <100: ICD-10-CM

## 2021-01-11 LAB
ANION GAP SERPL CALCULATED.3IONS-SCNC: 6 MMOL/L (ref 3–14)
BUN SERPL-MCNC: 11 MG/DL (ref 7–30)
CALCIUM SERPL-MCNC: 8.9 MG/DL (ref 8.5–10.1)
CHLORIDE SERPL-SCNC: 106 MMOL/L (ref 94–109)
CHOLEST SERPL-MCNC: 226 MG/DL
CO2 SERPL-SCNC: 25 MMOL/L (ref 20–32)
CREAT SERPL-MCNC: 0.78 MG/DL (ref 0.66–1.25)
CREAT UR-MCNC: 443 MG/DL
GFR SERPL CREATININE-BSD FRML MDRD: >90 ML/MIN/{1.73_M2}
GLUCOSE SERPL-MCNC: 131 MG/DL (ref 70–99)
HBA1C MFR BLD: 6.6 % (ref 0–5.6)
HDLC SERPL-MCNC: 36 MG/DL
LDLC SERPL CALC-MCNC: 129 MG/DL
MICROALBUMIN UR-MCNC: 88 MG/L
MICROALBUMIN/CREAT UR: 19.95 MG/G CR (ref 0–17)
NONHDLC SERPL-MCNC: 190 MG/DL
POTASSIUM SERPL-SCNC: 3.7 MMOL/L (ref 3.4–5.3)
SODIUM SERPL-SCNC: 137 MMOL/L (ref 133–144)
TRIGL SERPL-MCNC: 303 MG/DL

## 2021-01-11 PROCEDURE — 80048 BASIC METABOLIC PNL TOTAL CA: CPT | Performed by: FAMILY MEDICINE

## 2021-01-11 PROCEDURE — 90471 IMMUNIZATION ADMIN: CPT | Performed by: FAMILY MEDICINE

## 2021-01-11 PROCEDURE — 90686 IIV4 VACC NO PRSV 0.5 ML IM: CPT | Performed by: FAMILY MEDICINE

## 2021-01-11 PROCEDURE — 99214 OFFICE O/P EST MOD 30 MIN: CPT | Mod: 25 | Performed by: FAMILY MEDICINE

## 2021-01-11 PROCEDURE — 80061 LIPID PANEL: CPT | Performed by: FAMILY MEDICINE

## 2021-01-11 PROCEDURE — 83036 HEMOGLOBIN GLYCOSYLATED A1C: CPT | Performed by: FAMILY MEDICINE

## 2021-01-11 PROCEDURE — 99207 PR FOOT EXAM NO CHARGE: CPT | Performed by: FAMILY MEDICINE

## 2021-01-11 PROCEDURE — 82043 UR ALBUMIN QUANTITATIVE: CPT | Performed by: FAMILY MEDICINE

## 2021-01-11 PROCEDURE — 36415 COLL VENOUS BLD VENIPUNCTURE: CPT | Performed by: FAMILY MEDICINE

## 2021-01-11 RX ORDER — LANCETS
EACH MISCELLANEOUS
Qty: 100 EACH | Refills: 6 | Status: SHIPPED | OUTPATIENT
Start: 2021-01-11 | End: 2022-11-29

## 2021-01-11 RX ORDER — ALBENDAZOLE 200 MG/1
400 TABLET, FILM COATED ORAL 2 TIMES DAILY WITH MEALS
Qty: 4 TABLET | Refills: 0 | Status: SHIPPED | OUTPATIENT
Start: 2021-01-11 | End: 2021-03-10

## 2021-01-11 RX ORDER — GLUCOSAMINE HCL/CHONDROITIN SU 500-400 MG
CAPSULE ORAL
Qty: 100 EACH | Refills: 3 | Status: SHIPPED | OUTPATIENT
Start: 2021-01-11 | End: 2024-06-17

## 2021-01-19 ENCOUNTER — TELEPHONE (OUTPATIENT)
Dept: FAMILY MEDICINE | Facility: CLINIC | Age: 41
End: 2021-01-19

## 2021-01-19 NOTE — TELEPHONE ENCOUNTER
Prior Authorization Retail Medication Request    Medication/Dose: albendazole (ALBENZA) 200 MG tablet  ICD code (if different than what is on RX):    Previously Tried and Failed:    Rationale:  Pa is needed    Insurance Name:    Insurance ID:        Pharmacy Information (if different than what is on RX)  Name:    Phone:    Tricia Lane CMA

## 2021-01-22 NOTE — TELEPHONE ENCOUNTER
Prior Authorization Not Needed per Insurance    Medication: albendazole (ALBENZA) 200 MG tablet--NO PA NEEDED  Insurance Company: [place] Medicaid ([facility]) - Phone [phone] Fax [phone]  Expected CoPay:      Pharmacy Filling the Rx: [id number] [place] [address] [place] [facility]  Pharmacy Notified: Yes  Patient Notified: Yes **Instructed pharmacy to notify patient when script is ready to /ship.**

## 2021-01-22 NOTE — TELEPHONE ENCOUNTER
PA Initiation    Medication: albendazole (ALBENZA) 200 MG tablet  Insurance Company: [place] Medicaid ([facility]) - Phone [phone] Fax [phone]  Pharmacy Filling the Rx: [id number] - [place] [address] AT [facility] [place] [place] [facility] [address] [facility]  Filling Pharmacy Phone: [phone]  Filling Pharmacy Fax: [phone]  Start Date: 1/22/2021

## 2021-03-08 DIAGNOSIS — B80 PINWORMS: ICD-10-CM

## 2021-03-08 DIAGNOSIS — E78.5 HYPERLIPIDEMIA ASSOCIATED WITH TYPE 2 DIABETES MELLITUS (H): ICD-10-CM

## 2021-03-08 DIAGNOSIS — T78.2XXD ANAPHYLAXIS, SUBSEQUENT ENCOUNTER: Primary | ICD-10-CM

## 2021-03-08 DIAGNOSIS — I10 HTN, GOAL BELOW 140/90: ICD-10-CM

## 2021-03-08 DIAGNOSIS — E11.69 HYPERLIPIDEMIA ASSOCIATED WITH TYPE 2 DIABETES MELLITUS (H): ICD-10-CM

## 2021-03-08 NOTE — TELEPHONE ENCOUNTER
Patient is calling and would like refills on a pinworms medication.    Please send all of his medications to his pharmacy.    Best number to contact the patient back at is   Home Phone [phone]   Mobile [phone]     Ok to leave a message if you get a voice mail.    Thank you,   Anne Junior,

## 2021-03-10 RX ORDER — ALBENDAZOLE 200 MG/1
400 TABLET, FILM COATED ORAL 2 TIMES DAILY WITH MEALS
Qty: 4 TABLET | Refills: 0 | Status: SHIPPED | OUTPATIENT
Start: 2021-03-10 | End: 2021-04-14

## 2021-03-10 RX ORDER — AMLODIPINE BESYLATE 5 MG/1
5 TABLET ORAL DAILY
Qty: 90 TABLET | Refills: 1 | Status: SHIPPED | OUTPATIENT
Start: 2021-03-10 | End: 2021-10-06

## 2021-03-10 RX ORDER — CETIRIZINE HYDROCHLORIDE 10 MG/1
10 TABLET ORAL EVERY EVENING
Qty: 90 TABLET | Refills: 3 | Status: SHIPPED | OUTPATIENT
Start: 2021-03-10 | End: 2024-06-17

## 2021-03-10 RX ORDER — ROSUVASTATIN CALCIUM 10 MG/1
TABLET, COATED ORAL
Qty: 90 TABLET | Refills: 1 | Status: SHIPPED | OUTPATIENT
Start: 2021-03-10 | End: 2021-10-06

## 2021-03-10 RX ORDER — EPINEPHRINE 0.3 MG/.3ML
0.3 INJECTION SUBCUTANEOUS PRN
Qty: 2 EACH | Refills: 4 | Status: SHIPPED | OUTPATIENT
Start: 2021-03-10 | End: 2024-06-17

## 2021-03-10 NOTE — TELEPHONE ENCOUNTER
Routing refill request to provider for review/approval because:  Drug not on the [facility] refill protocol   BP Readings from Last 3 Encounters:   01/11/21 (!) 142/110   02/11/20 118/80   12/23/19 (!) 142/96

## 2021-04-14 ENCOUNTER — OFFICE VISIT (OUTPATIENT)
Dept: FAMILY MEDICINE | Facility: CLINIC | Age: 41
End: 2021-04-14
Payer: COMMERCIAL

## 2021-04-14 VITALS
HEIGHT: 65 IN | HEART RATE: 96 BPM | WEIGHT: 202 LBS | BODY MASS INDEX: 33.66 KG/M2 | TEMPERATURE: 97.4 F | SYSTOLIC BLOOD PRESSURE: 125 MMHG | DIASTOLIC BLOOD PRESSURE: 83 MMHG

## 2021-04-14 DIAGNOSIS — E11.9 TYPE 2 DIABETES MELLITUS WITHOUT COMPLICATION, WITH LONG-TERM CURRENT USE OF INSULIN (H): ICD-10-CM

## 2021-04-14 DIAGNOSIS — B80 PINWORMS: ICD-10-CM

## 2021-04-14 DIAGNOSIS — F43.21 SITUATIONAL DEPRESSION: Primary | ICD-10-CM

## 2021-04-14 DIAGNOSIS — Z79.4 TYPE 2 DIABETES MELLITUS WITHOUT COMPLICATION, WITH LONG-TERM CURRENT USE OF INSULIN (H): ICD-10-CM

## 2021-04-14 PROCEDURE — 99214 OFFICE O/P EST MOD 30 MIN: CPT | Performed by: PHYSICIAN ASSISTANT

## 2021-04-14 RX ORDER — ALBENDAZOLE 200 MG/1
TABLET, FILM COATED ORAL
Qty: 4 TABLET | Refills: 0 | Status: SHIPPED | OUTPATIENT
Start: 2021-04-14 | End: 2022-08-02

## 2021-04-14 ASSESSMENT — MIFFLIN-ST. JEOR: SCORE: 1753.15

## 2021-04-14 NOTE — PROGRESS NOTES
{PROVIDER CHARTING PREFERENCE:453689}    Cyndee Billy is a 40 year old who presents for the following health issues {ACCOMPANIED BY STATEMENT (Optional):759998}    HPI     Diabetes Follow-up      How often are you checking your blood sugar? Not at all    What concerns do you have today about your diabetes? None     Do you have any of these symptoms? (Select all that apply)  Numbness in fingers,lips and blurry vision     Have you had a diabetic eye exam in the last 12 months? No        BP Readings from Last 2 Encounters:   01/11/21 (!) 142/110   02/11/20 118/80     Hemoglobin A1C (%)   Date Value   01/11/2021 6.6 (H)   02/11/2020 5.6     LDL Cholesterol Calculated (mg/dL)   Date Value   01/11/2021 129 (H)   10/22/2019 88       {Reference  Diabetes Management Resources :559432}    {Reference  Diabetes Log - 7 days :253308}      How many servings of fruits and vegetables do you eat daily?  { :443259}    On average, how many sweetened beverages do you drink each day (Examples: soda, juice, sweet tea, etc.  Do NOT count diet or artificially sweetened beverages)?   { 1-11:369181}    How many days per week do you exercise enough to make your heart beat faster? { :442371}    How many minutes a day do you exercise enough to make your heart beat faster? { :252300}    How many days per week do you miss taking your medication? {0-7 :880063}    {additonal problems for provider to add (Optional):192463}    Review of Systems   {ROS COMP (Optional):845225}      Objective    There were no vitals taken for this visit.  There is no height or weight on file to calculate BMI.  Physical Exam   {Exam List (Optional):646969}    {Diagnostic Test Results (Optional):402613}    {AMBULATORY ATTESTATION (Optional):822999}

## 2021-04-14 NOTE — PROGRESS NOTES
"    Assessment & Plan     Situational depression  Wenceslao states that his mental health has been especially difficult since he got laid off from his job, and he does not have someone that he feels comfortable talking about his mental health to. He expresses interest in cognitive behavioral therapy. Therefore, a referral was sent today to mental health.     - MENTAL HEALTH REFERRAL  - Adult; Outpatient Treatment; Individual/Couples/Family/Group Therapy/Health Psychology; [facility] [phone]; We will contact you to schedule the appointment or please call with any questions    Type 2 diabetes mellitus without complication, with long-term current use of insulin (H)  Wenceslao has gained 40 lbs since COVID-19 started. Encourage him to cook more at home and do takeout less. Encourage him to get outside and engage in physical activity, such as walking, on a weekly basis. Send referral for routine eye exam.     - EYE ADULT REFERRAL; Future    Pinworms  Wenceslao admits that he did not understand that he was supposed to take Albendazole one week later, so he threw away his leftover medication after taking the initial 2 tabs. Wenceslao states that his symptoms of pinworm have returned and has requested a new prescription of Albendazole today. Refilled medication today. Explained the dosing directions thoroughly to him, to which he expressed understanding.     - albendazole (ALBENZA) 200 MG tablet; 2 tabs once then repeat a week later             BMI:   Estimated body mass index is 33.61 kg/m  as calculated from the following:    Height as of this encounter: 1.651 m (5' 5\").    Weight as of this encounter: 91.6 kg (202 lb).           Return in about 3 months (around 7/14/2021) for diabetes.    JUAN LUIS Kimble-S2  The above student acted as scribe and the encounter documented above was  performed by myself and the documentation reflects the work I have performed today.  Katie Rucker PA-C  M " [facility] KEESHA Billy is a 40 year old who presents for the following health issues     HPI   Wenceslao comes to clinic for a diabetes check. He states that since COVID-19 began, he has gained 40 lbs, as he is eating fast food every day and does not exercise regularly. He also endorses drinking alcoholic beverages more during COVID; he states that he used to drink only on weekends, but now drinks [place] Petros/Coke several times a week. 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 He states that he does not have anyone he feels comfortable talking to, and he is not seeing as many people as he used to with COVID. He is hoping to get hired again in the near future now that the weather is getting warmer for construction season.    Wenceslao reports that he has bleeding in stool sometimes.  Sometimes notice lumps in the buttock. He recently had a upper endoscopy and colonoscopy. He is currently being treated for a pinworm infection, but noticed that he did not take the medication as directed and his symptoms have returned, so he is asking for a refill today.  He is very hesitant to talk about his symptoms.        Diabetes Follow-up and refill       How often are you checking your blood sugar? Not at all    What concerns do you have today about your diabetes? None     Do you have any of these symptoms? (Select all that apply)  Numbness in feet and Blurry vision,numbness in lips     Have you had a diabetic eye exam in the last 12 months? No        BP Readings from Last 2 Encounters:   04/14/21 125/83   01/11/21 (!) 142/110     Hemoglobin A1C (%)   Date Value   01/11/2021 6.6 (H)   02/11/2020 5.6     LDL Cholesterol Calculated (mg/dL)   Date Value   01/11/2021 129 (H)   10/22/2019 88                 How many servings of fruits and vegetables do you eat daily?  1    On average, how many sweetened beverages do you  "drink each day (Examples: soda, juice, sweet tea, etc.  Do NOT count diet or artificially sweetened beverages)?   0    How many days per week do you exercise enough to make your heart beat faster? none    How many minutes a day do you exercise enough to make your heart beat faster? none    How many days per week do you miss taking your medication? 0    Medication Followup of MAIRA     Taking Medication as prescribed: Did not finish (threw away by accident)    Side Effects:  None    Medication Helping Symptoms:  yes         Review of Systems   As above      Objective    /83   Pulse 96   Temp 97.4  F (36.3  C) (Tympanic)   Ht 1.651 m (5' 5\")   Wt 91.6 kg (202 lb)   BMI 33.61 kg/m    Body mass index is 33.61 kg/m .  Physical Exam  Constitutional:       Appearance: Normal appearance.   HENT:      Head: Normocephalic.      Right Ear: Tympanic membrane, ear canal and external ear normal.      Left Ear: Tympanic membrane, ear canal and external ear normal.      Nose: Nose normal.      Mouth/Throat:      Mouth: Mucous membranes are moist.      Pharynx: Oropharynx is clear.   Eyes:      Conjunctiva/sclera: Conjunctivae normal.      Pupils: Pupils are equal, round, and reactive to light.   Cardiovascular:      Rate and Rhythm: Normal rate and regular rhythm.      Pulses:           Dorsalis pedis pulses are 2+ on the right side and 2+ on the left side.        Posterior tibial pulses are 2+ on the right side and 2+ on the left side.   Pulmonary:      Effort: Pulmonary effort is normal.      Breath sounds: Normal breath sounds.   Feet:      Right foot:      Protective Sensation: 5 sites tested. 5 sites sensed.      Skin integrity: Skin integrity normal.      Left foot:      Protective Sensation: 5 sites tested. 5 sites sensed.      Skin integrity: Skin integrity normal.   Skin:     General: Skin is warm.      Capillary Refill: Capillary refill takes less than 2 seconds.   Neurological:      Mental Status: He is " alert and oriented to person, place, and time.   Psychiatric:         Mood and Affect: Mood normal.

## 2021-04-18 ENCOUNTER — HEALTH MAINTENANCE LETTER (OUTPATIENT)
Age: 41
End: 2021-04-18

## 2021-04-22 ENCOUNTER — IMMUNIZATION (OUTPATIENT)
Dept: NURSING | Facility: CLINIC | Age: 41
End: 2021-04-22
Payer: COMMERCIAL

## 2021-04-22 ENCOUNTER — OFFICE VISIT (OUTPATIENT)
Dept: FAMILY MEDICINE | Facility: CLINIC | Age: 41
End: 2021-04-22
Payer: COMMERCIAL

## 2021-04-22 VITALS
HEART RATE: 112 BPM | WEIGHT: 193.8 LBS | BODY MASS INDEX: 33.09 KG/M2 | DIASTOLIC BLOOD PRESSURE: 84 MMHG | RESPIRATION RATE: 28 BRPM | OXYGEN SATURATION: 95 % | HEIGHT: 64 IN | TEMPERATURE: 97.8 F | SYSTOLIC BLOOD PRESSURE: 102 MMHG

## 2021-04-22 DIAGNOSIS — E11.9 TYPE 2 DIABETES MELLITUS WITHOUT COMPLICATION, WITH LONG-TERM CURRENT USE OF INSULIN (H): ICD-10-CM

## 2021-04-22 DIAGNOSIS — J01.10 ACUTE NON-RECURRENT FRONTAL SINUSITIS: ICD-10-CM

## 2021-04-22 DIAGNOSIS — Z79.4 TYPE 2 DIABETES MELLITUS WITHOUT COMPLICATION, WITH LONG-TERM CURRENT USE OF INSULIN (H): ICD-10-CM

## 2021-04-22 DIAGNOSIS — K21.00 GASTROESOPHAGEAL REFLUX DISEASE WITH ESOPHAGITIS WITHOUT HEMORRHAGE: ICD-10-CM

## 2021-04-22 DIAGNOSIS — B37.0 ORAL THRUSH: Primary | ICD-10-CM

## 2021-04-22 PROCEDURE — 99214 OFFICE O/P EST MOD 30 MIN: CPT | Performed by: FAMILY MEDICINE

## 2021-04-22 PROCEDURE — 0001A PR COVID VAC PFIZER DIL RECON 30 MCG/0.3 ML IM: CPT

## 2021-04-22 PROCEDURE — 91300 PR COVID VAC PFIZER DIL RECON 30 MCG/0.3 ML IM: CPT

## 2021-04-22 RX ORDER — DOXYCYCLINE 100 MG/1
100 CAPSULE ORAL 2 TIMES DAILY
Qty: 20 CAPSULE | Refills: 0 | Status: SHIPPED | OUTPATIENT
Start: 2021-04-22 | End: 2021-05-12

## 2021-04-22 RX ORDER — FLUCONAZOLE 150 MG/1
150 TABLET ORAL DAILY
Qty: 3 TABLET | Refills: 0 | Status: SHIPPED | OUTPATIENT
Start: 2021-04-22 | End: 2021-04-25

## 2021-04-22 ASSESSMENT — MIFFLIN-ST. JEOR: SCORE: 1705.94

## 2021-04-22 ASSESSMENT — PAIN SCALES - GENERAL: PAINLEVEL: NO PAIN (0)

## 2021-04-22 NOTE — PATIENT INSTRUCTIONS
Patient Education     Candida Infection: Thrush  Thrush is a fungal infection in the mouth and throat. Thrush doesn't usually affect healthy adults. It's more common in people with a weak immune system. It's also more likely if you take antibiotics. Thrush is normally not contagious.   Understanding fungus in the mouth and throat  Your mouth and throat normally contain millions of tiny organisms. These include bacteria and yeasts. Many of these don't cause any problems. In fact, they may help fight disease.   Yeasts are a type of fungus. A type of yeast called Candida normally lives on the membranes of your mouth and throat. It also lives in the digestive tract and on your skin. Usually, this yeast grows only in small amounts and is harmless. But in some cases, Candida can grow out of control and cause thrush. Thrush is related to other kinds of Candida infections that can occur at other parts of the body. Thrush refers to an infection of only the mouth and throat.   What causes thrush?  Thrush happens when something lets too much Candida grow inside your mouth and throat. Certain things that change the normal balance of organisms in the mouth can lead to thrush. One example is antibiotic medicine. This medicine may kill some of the normal bacteria in your mouth. Candida can then grow freely. People on antibiotics have an increased risk for thrush.   You have a higher risk for thrush if you:    Wear dentures    Are getting chemotherapy or radiation therapy    Have diabetes    Have a transplanted organ    Use corticosteroids, including inhaled corticosteroids for lung disease    Have a weak immune system, such as from HIV infection or AIDS    Are an older adult  Symptoms of thrush  Symptoms of thrush can include:    A dry, cottony feeling in your mouth    Cracking at the corners of the mouth    Loss of taste    Pain while eating or swallowing    White patches on the tongue and around the sides of the  "mouth  Diagnosing thrush  Your healthcare provider will ask about your medical history and your symptoms. He or she will look closely at your mouth and throat. White or red patches will be found and may be scraped with a tongue depressor. A sample may be looked at under a microscope or sent to a lab to test. Most cases are confirmed just by their appearance; testing can sometimes help to confirm thrush.   If you have thrush, you may also have esophageal candidiasis. This is more common in people who have AIDS or a weak immune system for another reason. Your healthcare provider may diagnose this based on your symptoms, and may check for this condition with an upper endoscopy. This is a procedure to look at the esophagus. A tissue sample may be taken to test.   Treatment for thrush  Thrush is usually treated with antifungal medicine. For mild cases, the medicine is often applied directly in your mouth and throat. This may be in the form of a  swish and swallow  medicine or an antifungal lozenge to suck on and dissolve in your mouth.   In more extensive cases, or if you have a weakened immune system, you may instead be treated with an antifungal pill. This can be a stronger treatment than a \"swish and swallow\" or lozenge antifungal. Or you may need medicine through an IV (intravenous) line. These treatments depend on how severe your infection is, and what other health conditions you have.   If you are at high risk for thrush, you may need to keep taking oral antifungal medicine. This is to help prevent thrush in the future.   What happens if you don t get treated for thrush?  If untreated, the Candida may make it difficult to eat or drink. Or it can spread to the esophagus and rarely to other parts your body.   Preventing thrush  You may be able to help prevent some cases of thrush. Make sure to:    Practice good oral hygiene.    Clean your dentures regularly as instructed. Make sure they fit you correctly.    After " using a corticosteroid inhaler, rinse out your mouth with water or mouthwash.    Don't use broad-spectrum antibiotics, if possible.    Get treated for health problems that increase your risk for thrush, such as diabetes or HIV.  When to call the healthcare provider  Call your healthcare provider right away if you have any of these:    Cottony feeling in your mouth    Loss of taste    Pain while eating or swallowing    White patches or plaques on your tongue or inside your mouth  Samantha last reviewed this educational content on 4/1/2020 2000-2021 [employer]. All rights reserved. This information is not intended as a substitute for professional medical care. Always follow your healthcare professional's instructions.

## 2021-04-22 NOTE — PROGRESS NOTES
"    Assessment & Plan     Oral thrush  Advised with good hygiene, wash, brush tongue every time of breath smells.  90s.  - fluconazole (DIFLUCAN) 150 MG tablet; Take 1 tablet (150 mg) by mouth daily for 3 days    Gastroesophageal reflux disease with esophagitis without hemorrhage  Advised with diet modification, avoid spicy food, acidic food.  Avoid late meals.  - omeprazole (PRILOSEC) 20 MG DR capsule; Take 1 capsule (20 mg) by mouth daily    Acute non-recurrent frontal sinusitis    - doxycycline hyclate (VIBRAMYCIN) 100 MG capsule; Take 1 capsule (100 mg) by mouth 2 times daily    Type 2 diabetes mellitus without complication, with long-term current use of insulin (H)  Been stable, last hemoglobin A1c was 6.6.  Advised with diet medication, weight loss.    Follow-up with primary care physician in 3 months.    BMI:   Estimated body mass index is 32.88 kg/m  as calculated from the following:    Height as of this encounter: 1.635 m (5' 4.37\").    Weight as of this encounter: 87.9 kg (193 lb 12.8 oz).   Weight management plan: Discussed healthy diet and exercise guidelines    Patient Instructions     Patient Education     Candida Infection: Thrush  Thrush is a fungal infection in the mouth and throat. Thrush doesn't usually affect healthy adults. It's more common in people with a weak immune system. It's also more likely if you take antibiotics. Thrush is normally not contagious.   Understanding fungus in the mouth and throat  Your mouth and throat normally contain millions of tiny organisms. These include bacteria and yeasts. Many of these don't cause any problems. In fact, they may help fight disease.   Yeasts are a type of fungus. A type of yeast called Candida normally lives on the membranes of your mouth and throat. It also lives in the digestive tract and on your skin. Usually, this yeast grows only in small amounts and is harmless. But in some cases, Candida can grow out of control and cause thrush. Thrush is " related to other kinds of Candida infections that can occur at other parts of the body. Thrush refers to an infection of only the mouth and throat.   What causes thrush?  Thrush happens when something lets too much Candida grow inside your mouth and throat. Certain things that change the normal balance of organisms in the mouth can lead to thrush. One example is antibiotic medicine. This medicine may kill some of the normal bacteria in your mouth. Candida can then grow freely. People on antibiotics have an increased risk for thrush.   You have a higher risk for thrush if you:    Wear dentures    Are getting chemotherapy or radiation therapy    Have diabetes    Have a transplanted organ    Use corticosteroids, including inhaled corticosteroids for lung disease    Have a weak immune system, such as from HIV infection or AIDS    Are an older adult  Symptoms of thrush  Symptoms of thrush can include:    A dry, cottony feeling in your mouth    Cracking at the corners of the mouth    Loss of taste    Pain while eating or swallowing    White patches on the tongue and around the sides of the mouth  Diagnosing thrush  Your healthcare provider will ask about your medical history and your symptoms. He or she will look closely at your mouth and throat. White or red patches will be found and may be scraped with a tongue depressor. A sample may be looked at under a microscope or sent to a lab to test. Most cases are confirmed just by their appearance; testing can sometimes help to confirm thrush.   If you have thrush, you may also have esophageal candidiasis. This is more common in people who have AIDS or a weak immune system for another reason. Your healthcare provider may diagnose this based on your symptoms, and may check for this condition with an upper endoscopy. This is a procedure to look at the esophagus. A tissue sample may be taken to test.   Treatment for thrush  Thrush is usually treated with antifungal medicine. For  "mild cases, the medicine is often applied directly in your mouth and throat. This may be in the form of a  swish and swallow  medicine or an antifungal lozenge to suck on and dissolve in your mouth.   In more extensive cases, or if you have a weakened immune system, you may instead be treated with an antifungal pill. This can be a stronger treatment than a \"swish and swallow\" or lozenge antifungal. Or you may need medicine through an IV (intravenous) line. These treatments depend on how severe your infection is, and what other health conditions you have.   If you are at high risk for thrush, you may need to keep taking oral antifungal medicine. This is to help prevent thrush in the future.   What happens if you don t get treated for thrush?  If untreated, the Candida may make it difficult to eat or drink. Or it can spread to the esophagus and rarely to other parts your body.   Preventing thrush  You may be able to help prevent some cases of thrush. Make sure to:    Practice good oral hygiene.    Clean your dentures regularly as instructed. Make sure they fit you correctly.    After using a corticosteroid inhaler, rinse out your mouth with water or mouthwash.    Don't use broad-spectrum antibiotics, if possible.    Get treated for health problems that increase your risk for thrush, such as diabetes or HIV.  When to call the healthcare provider  Call your healthcare provider right away if you have any of these:    Cottony feeling in your mouth    Loss of taste    Pain while eating or swallowing    White patches or plaques on your tongue or inside your mouth  [employer] last reviewed this educational content on 4/1/2020 2000-2021 [employer]. All rights reserved. This information is not intended as a substitute for professional medical care. Always follow your healthcare professional's instructions.               Return in about 3 months (around 7/22/2021) for Routine preventive.    Maranda Mcelroy, " MD [facility] BREANNE Billy is a 40 year old who presents for the following health issues:  Patient reports he has been having dryness mouth, soreness in his mouth for many months.  He has no fever, no chills.  He reports sometimes in the morning when he wake up he has congestion, and drainage.    He does have acid reflux, he denies any nausea or vomiting, denies blood in his urine or stool.    Patient reports he had upper and lower endoscopy a year ago. Normal results    Concern - dry throat  Onset: ongoing  Description: very dry and sore throat  Intensity: moderate, severe  Progression of Symptoms:  worsening and constant  Accompanying Signs & Symptoms: mucus spit  Previous history of similar problem: yes  Precipitating factors:        Worsened by: none  Alleviating factors:        Improved by: none  Therapies tried and outcome: OTC numbing spray for throat    Patient denies having strep.    Review of Systems   Constitutional, HEENT, cardiovascular, pulmonary, GI, , musculoskeletal, neuro, skin, endocrine and psych systems are negative, except as otherwise noted.      Objective    There were no vitals taken for this visit.  There is no height or weight on file to calculate BMI.  Physical Exam   GENERAL: healthy, alert and no distress  HENT: Oral thrush.  RESP: lungs clear to auscultation - no rales, rhonchi or wheezes  CV: regular rate and rhythm, normal S1 S2, no S3 or S4, no murmur, click or rub, no peripheral edema and peripheral pulses strong  ABDOMEN: soft, nontender, no hepatosplenomegaly, no masses and bowel sounds normal  MS: no gross musculoskeletal defects noted, no edema  PSYCH: mentation appears normal, affect normal/bright    Orders Placed This Encounter   Procedures     REVIEW OF HEALTH MAINTENANCE PROTOCOL ORDERS       Maranda Mcelroy MD

## 2021-05-11 NOTE — PROGRESS NOTES
Assessment & Plan   Wenceslao is a 39 yo M with a f=hx of DM2, HTN, HPLD, pulmonary nodules (CT 12/2011 and 2/2020) , hilar adenopathy (incidental on CT by GI 2/2020), former smoker (quit 6/2005) presenting for diabetes follow up.    Type 2 diabetes mellitus without complication, without long-term current use of insulin (H)     Blood sugars very high and having symptoms of hyperglycemia. He has not been following a healthy diet and doing alcohol as well. Has not had refillls for Metformin though says has some.    Reviewed the nature of diabetes and its complications.  Went over co morbidities, need for good blood sugar control as well as BP and Cholesterol control with a healthy diet andregular exercise.  Discussed the recheck schedule; if symptoms worsen will call back.  - Hemoglobin A1c  - insulin glargine (LANTUS PEN) 100 UNIT/ML pen; Inject 20 Units Subcutaneous At Bedtime  - blood glucose (NO BRAND SPECIFIED) test strip; Use to test blood sugar 1 times daily or as directed. To accompany: Blood Glucose Monitor Brands: per insurance.  - metFORMIN (GLUCOPHAGE) 1000 MG tablet; Take 1 tablet (1,000 mg) by mouth 2 times daily (with meals)    Hyperlipidemia associated with type 2 diabetes mellitus (H)    - recent LDL was good. On crestor    Urinary Frequency:      -  Due to glycosuria.    Return for call with update. in 1 week    Casey Sheldon MD  [facility] KEESHA Billy is a 40 year old who presents for the following health issues     HPI     Diabetes Follow-up   Been having frequency   Feels thirsty   Blood sugars frequently in the 400's   Not been eating right; eating out a lot and doing alcohol  How often are you checking your blood sugar? Two times daily  Blood sugar testing frequency justification:  Uncontrolled diabetes  What time of day are you checking your blood sugars (select all that apply)?  Before meals  Have you had any blood sugars above 200?  Yes 400  Have  you had any blood sugars below 70?  No    What symptoms do you notice when your blood sugar is low?  None    What concerns do you have today about your diabetes? None     Do you have any of these symptoms? (Select all that apply)  No numbness or tingling in feet.  No redness, sores or blisters on feet.  No complaints of excessive thirst.  No reports of blurry vision.  No significant changes to weight.    Have you had a diabetic eye exam in the last 12 months? No        BP Readings from Last 2 Encounters:   05/12/21 124/82   04/22/21 102/84     Hemoglobin A1C (%)   Date Value   05/12/2021 12.1 (H)   01/11/2021 6.6 (H)     LDL Cholesterol Calculated (mg/dL)   Date Value   01/11/2021 129 (H)   10/22/2019 88         How many servings of fruits and vegetables do you eat daily?  0-1    On average, how many sweetened beverages do you drink each day (Examples: soda, juice, sweet tea, etc.  Do NOT count diet or artificially sweetened beverages)?   0    How many days per week do you exercise enough to make your heart beat faster? 3 or less    How many minutes a day do you exercise enough to make your heart beat faster? 9 or less    How many days per week do you miss taking your medication? 0      Review of Systems   HEENT, cardiovascular, pulmonary and gi systems are negative, except as otherwise noted.      Objective    /82   Pulse 99   Temp 97.5  F (36.4  C) (Oral)   Wt 87.5 kg (193 lb)   SpO2 99%   BMI 32.75 kg/m    Body mass index is 32.75 kg/m .  Physical Exam   GENERAL: healthy, alert and no distress  RESP: lungs clear to auscultation - no rales, rhonchi or wheezes  CV: regular rate and rhythm, no murmur, click or rub, no peripheral edema   MS: no gross musculoskeletal defects noted, no edema

## 2021-05-12 ENCOUNTER — OFFICE VISIT (OUTPATIENT)
Dept: FAMILY MEDICINE | Facility: CLINIC | Age: 41
End: 2021-05-12
Payer: COMMERCIAL

## 2021-05-12 VITALS
SYSTOLIC BLOOD PRESSURE: 124 MMHG | BODY MASS INDEX: 32.75 KG/M2 | HEART RATE: 99 BPM | DIASTOLIC BLOOD PRESSURE: 82 MMHG | WEIGHT: 193 LBS | OXYGEN SATURATION: 99 % | TEMPERATURE: 97.5 F

## 2021-05-12 DIAGNOSIS — R35.0 URINARY FREQUENCY: ICD-10-CM

## 2021-05-12 DIAGNOSIS — E11.69 HYPERLIPIDEMIA ASSOCIATED WITH TYPE 2 DIABETES MELLITUS (H): ICD-10-CM

## 2021-05-12 DIAGNOSIS — E11.9 TYPE 2 DIABETES MELLITUS WITHOUT COMPLICATION, WITHOUT LONG-TERM CURRENT USE OF INSULIN (H): Primary | ICD-10-CM

## 2021-05-12 DIAGNOSIS — E78.5 HYPERLIPIDEMIA ASSOCIATED WITH TYPE 2 DIABETES MELLITUS (H): ICD-10-CM

## 2021-05-12 LAB — HBA1C MFR BLD: 12.1 % (ref 0–5.6)

## 2021-05-12 PROCEDURE — 36415 COLL VENOUS BLD VENIPUNCTURE: CPT | Performed by: FAMILY MEDICINE

## 2021-05-12 PROCEDURE — 99214 OFFICE O/P EST MOD 30 MIN: CPT | Performed by: FAMILY MEDICINE

## 2021-05-12 PROCEDURE — 83036 HEMOGLOBIN GLYCOSYLATED A1C: CPT | Performed by: FAMILY MEDICINE

## 2021-05-14 ENCOUNTER — IMMUNIZATION (OUTPATIENT)
Dept: NURSING | Facility: CLINIC | Age: 41
End: 2021-05-14
Attending: FAMILY MEDICINE
Payer: COMMERCIAL

## 2021-05-14 PROCEDURE — 91300 PR COVID VAC PFIZER DIL RECON 30 MCG/0.3 ML IM: CPT

## 2021-05-14 PROCEDURE — 0002A PR COVID VAC PFIZER DIL RECON 30 MCG/0.3 ML IM: CPT

## 2021-05-20 ENCOUNTER — NURSE TRIAGE (OUTPATIENT)
Dept: FAMILY MEDICINE | Facility: CLINIC | Age: 41
End: 2021-05-20

## 2021-05-20 DIAGNOSIS — E11.65 TYPE 2 DIABETES MELLITUS WITH HYPERGLYCEMIA, WITH LONG-TERM CURRENT USE OF INSULIN (H): Primary | ICD-10-CM

## 2021-05-20 DIAGNOSIS — Z79.4 TYPE 2 DIABETES MELLITUS WITH HYPERGLYCEMIA, WITH LONG-TERM CURRENT USE OF INSULIN (H): Primary | ICD-10-CM

## 2021-05-20 NOTE — TELEPHONE ENCOUNTER
"Routing to Dr. Sheldon to review and advise.      Pt called to leave message for Dr. Sheldon to update with elevated BG of 457 this afternoon and symptomatic with frequent urination. Wanted to have Dr. Sheldon adjust insulin but provider is out of the office today.     Writer huddled with Adelia Quinteros on Red Team. Writer had pt recheck . Pt notified to increase to 22 units Lantus for next dose and keep eye on BG this evening then call clinic tomorrow to discuss further plan of care with Dr. Sheldon. Adelia did advise for Pt to go to ER if BG greater than 400 and symptomatic but at this time BG was trending down. Writer advised pt to recheck BG tonight and if back above 400, go to ER.     Pt was agreeable to all recommendations. Also advised to call clinic if he feels worse or BG increase this evening if he would like to speak to a triage RN.    Writer will route to Dr. Sheldon to review and and advise on next steps for pt when he returns to clinic tomorrow.     Initial Assessment Questions  1. BLOOD GLUCOSE: \"What is your blood glucose level?\" - 457; recheck 338  2. ONSET: \"When did you check the blood glucose?\" - noon on 5/20/21; recheck @ 1555 on 5/20/21  3. USUAL RANGE: \"What is your glucose level usually?\" (e.g., usual fasting morning value, usual evening value)  4. KETONES: \"Do you check for ketones (urine or blood test strips)?\" If yes, ask: \"What does the test show now?\" - n/a  5. TYPE 1 or 2: \"Do you know what type of diabetes you have?\" (e.g., Type 1, Type 2, Gestational; doesn't know) - type 2  6. INSULIN: \"Do you take insulin?\" \"What type of insulin(s) do you use? What is the mode of delivery? (syringe, pen; injection or pump)?\" - 20 units Lantus every evening  7. DIABETES PILLS: \"Do you take any pills for your diabetes?\" If yes, ask: \"Have you missed taking any pills recently?\" - 1000 mg Metformin BID  8. OTHER SYMPTOMS: \"Do you have any symptoms?\" (e.g., fever, frequent urination, difficulty " breathing, dizziness, weakness, vomiting) - frequent urination      Reason for Disposition    Blood glucose > 400 mg/dL (22.2 mmol/L)    Additional Information    Negative: Caller has URGENT medication or insulin pump question and triager unable to answer question    Negative: Fever > 100.4 F (38.0 C)    Negative: Blood glucose > 500 mg/dL (27.8 mmol/L)    Negative: Blood glucose > 240 mg/dL (13.3 mmol/L) AND urine ketones moderate-large (or more than 1+)    Negative: Blood glucose > 240 mg/dL (13.3 mmol/L) and blood ketones > 1.4 mmol/L    Negative: Blood glucose > 240 mg/dL (13.3 mmol/L) AND vomiting AND unable to check for ketones (in blood or urine)    Negative: Vomiting lasting > 4 hours    Negative: Patient sounds very sick or weak to the triager    Negative: Unconscious or difficult to awaken    Negative: Acting confused (e.g., disoriented, slurred speech)    Negative: Very weak (can't stand)    Negative: Sounds like a life-threatening emergency to the triager    Negative: Vomiting and signs of dehydration (e.g., very dry mouth, lightheaded, dark urine)    Negative: Blood glucose > 240 mg/dL (13.3 mmol/L) and rapid breathing    Protocols used: DIABETES - HIGH BLOOD SUGAR-A-OH

## 2021-05-27 NOTE — TELEPHONE ENCOUNTER
We can add meal time insulin and escalate lantus insulin as these are fast acting; if patient willing I would like to see him tomorrow at 8.20 AM (have opening and schedule him) to discuss an insulin escalation plan or adding another medication.   For tonight increase Lantus insulin to 26 units (increase of 4 units).  If feeling very fatigued will need to go to ER

## 2021-05-27 NOTE — TELEPHONE ENCOUNTER
Pt was given provider's message as written. Agrees with plan. Appt scheduled.    Emilia Mota RN  [facility]

## 2021-05-27 NOTE — TELEPHONE ENCOUNTER
Patient calling with update on blood sugars. He has been monitoring twice a day. Readings are typically ranging from high 300's into the 400's he has had a reading of 485. He continues to feel sluggish with frequent urination. This morning his reading was 306. Patient is asking about adding another medication.     Routing to PCP.     Joanna Turk RN

## 2021-05-28 ENCOUNTER — OFFICE VISIT (OUTPATIENT)
Dept: FAMILY MEDICINE | Facility: CLINIC | Age: 41
End: 2021-05-28
Payer: COMMERCIAL

## 2021-05-28 VITALS
OXYGEN SATURATION: 96 % | TEMPERATURE: 98.2 F | BODY MASS INDEX: 32.07 KG/M2 | SYSTOLIC BLOOD PRESSURE: 114 MMHG | HEART RATE: 109 BPM | DIASTOLIC BLOOD PRESSURE: 84 MMHG | WEIGHT: 189 LBS

## 2021-05-28 DIAGNOSIS — Z79.4 TYPE 2 DIABETES MELLITUS WITH HYPERGLYCEMIA, WITH LONG-TERM CURRENT USE OF INSULIN (H): Primary | ICD-10-CM

## 2021-05-28 DIAGNOSIS — E11.65 TYPE 2 DIABETES MELLITUS WITH HYPERGLYCEMIA, WITH LONG-TERM CURRENT USE OF INSULIN (H): Primary | ICD-10-CM

## 2021-05-28 LAB — KETONES UR STRIP-MCNC: NEGATIVE MG/DL

## 2021-05-28 PROCEDURE — 99213 OFFICE O/P EST LOW 20 MIN: CPT | Performed by: FAMILY MEDICINE

## 2021-05-28 PROCEDURE — 81003 URINALYSIS AUTO W/O SCOPE: CPT | Performed by: FAMILY MEDICINE

## 2021-05-28 NOTE — PATIENT INSTRUCTIONS
Type 2 Diabetes uncontrolled:   Plan:  1. Goal morning blood sugar: below 180.  2. Will Lantus insulin by 2 units every 3 days until get morning blood sugars below 108 or reach Lantus 40 units.  3. Follow up in 2 weeks.  4. Will check urine ketones today

## 2021-05-28 NOTE — PROGRESS NOTES
Assessment & Plan      Wenceslao is a 39 yo M with a f=hx of DM2, HTN, HPLD, pulmonary nodules (CT 12/2011 and 2/2020) , hilar adenopathy (incidental on CT by GI 2/2020), former smoker (quit 6/2005) here because high blood sugars    Type 2 diabetes mellitus with hyperglycemia, with long-term current use of insulin (H)  Patient has been off his diabetes medication; was restarted recently on Lantus insulin and Metformin, insulin dose was escalated and the blood sugar this morning was 217.  Discussed with the patient about continue to escalate the insulin doses by 2 units every 3 days until we get blood sugars below 180 mg/dl or get to Lantus insulin 40 units daily.  If has any new symptoms or concerns will call at the plan follow-up in 2 weeks.  We will check ketone in urine today  - Ketones urine    BMI 32.0-32.9,adult    -  Counseled to make better food choices, exercise as tolerated, and lose weight.     Return in about 2 weeks (around 6/11/2021) for Follow up for symptoms recheck.    Casey Sheldon MD  [facility] KEESHA Billy is a 41 year old who presents for the following health issues:      HPI       Chief Complaint   Patient presents with     Hyperglycemia     discuss meds and plan     Patient with diabetes was off medications for a while and returned recenytly with very high blood sugars and started on Lantus Insulin 20 units, increased to 22 units daily and to 26 units yesterday. Also taking Metformin 1000 mg twice daily.  Blood sugar was 217  No longer feeling very thirsty, frequency of urination going down and blurry vision has resolved  Denies any numbness or tingling in the feet.    Review of Systems   HEENT, cardiovascular, pulmonary and gi systems are negative, except as otherwise noted.      Objective    /84   Pulse 109   Temp 98.2  F (36.8  C) (Oral)   Wt 85.7 kg (189 lb)   SpO2 96%   BMI 32.07 kg/m    Body mass index is 32.07 kg/m .  Physical Exam    GENERAL: anxious, alert and no distress  RESP: lungs clear to auscultation - no rales, rhonchi or wheezes  CV: regular rate and rhythm, normal S1 S2, no S3 or S4, no murmur, click or rub.  PSYCH: mentation appears normal, affect normal/bright

## 2021-06-30 ENCOUNTER — OFFICE VISIT (OUTPATIENT)
Dept: FAMILY MEDICINE | Facility: CLINIC | Age: 41
End: 2021-06-30
Payer: COMMERCIAL

## 2021-06-30 VITALS
BODY MASS INDEX: 33.55 KG/M2 | HEART RATE: 111 BPM | WEIGHT: 196.5 LBS | DIASTOLIC BLOOD PRESSURE: 72 MMHG | SYSTOLIC BLOOD PRESSURE: 113 MMHG | HEIGHT: 64 IN | OXYGEN SATURATION: 96 % | TEMPERATURE: 98.8 F | RESPIRATION RATE: 20 BRPM

## 2021-06-30 DIAGNOSIS — M79.644 PAIN OF FINGER OF RIGHT HAND: Primary | ICD-10-CM

## 2021-06-30 DIAGNOSIS — M54.9 ACUTE BILATERAL BACK PAIN, UNSPECIFIED BACK LOCATION: ICD-10-CM

## 2021-06-30 DIAGNOSIS — Z11.4 SCREENING FOR HIV (HUMAN IMMUNODEFICIENCY VIRUS): ICD-10-CM

## 2021-06-30 PROCEDURE — 80053 COMPREHEN METABOLIC PANEL: CPT | Performed by: NURSE PRACTITIONER

## 2021-06-30 PROCEDURE — 87389 HIV-1 AG W/HIV-1&-2 AB AG IA: CPT | Performed by: NURSE PRACTITIONER

## 2021-06-30 PROCEDURE — 99213 OFFICE O/P EST LOW 20 MIN: CPT | Performed by: NURSE PRACTITIONER

## 2021-06-30 PROCEDURE — 36415 COLL VENOUS BLD VENIPUNCTURE: CPT | Performed by: NURSE PRACTITIONER

## 2021-06-30 RX ORDER — CYCLOBENZAPRINE HCL 5 MG
5 TABLET ORAL 3 TIMES DAILY PRN
Qty: 30 TABLET | Refills: 0 | Status: SHIPPED | OUTPATIENT
Start: 2021-06-30 | End: 2022-08-18

## 2021-06-30 ASSESSMENT — PAIN SCALES - GENERAL: PAINLEVEL: EXTREME PAIN (8)

## 2021-06-30 ASSESSMENT — MIFFLIN-ST. JEOR: SCORE: 1713.19

## 2021-06-30 NOTE — PATIENT INSTRUCTIONS
Patient Education     Relieving Back Pain  Back pain is a common problem. You can strain back muscles by lifting too much weight or just by moving the wrong way. Back strain can be uncomfortable, even painful. And it can take weeks or months to improve. To help yourself feel better and prevent future back strains, try these tips.  Important: Don't give aspirin to children or teens without first discussing it with your child's healthcare provider.  Ice    Ice reduces muscle pain and swelling. It helps most during the first 24 to 48 hours after an injury.    Wrap an ice pack or a bag of frozen peas in a thin towel. Never put ice directly on your skin.    Place the ice where your back hurts the most.    Don t ice for more than 20 minutes at a time.    You can use ice several times a day.  Medicines  Over-the-counter pain relievers include acetaminophen and anti-inflammatory medicines, which includes aspirin, naproxen, or ibuprofen. They can help ease discomfort. Some also reduce swelling.    Tell your healthcare provider about any medicines you are already taking.    Take medicines only as directed.  Manipulation and massage  Having manipulation by an osteopathic doctor or chiropractor may be helpful. Getting a massage also may help.   Heat  After the first 48 hours, heat can relax sore muscles and improve blood flow.    Try a warm bath or shower. Or use a heating pad set on low. To prevent a burn, keep a cloth between you and the heating pad.    Don t use a heating pad for more than 15 minutes at a time. Never sleep on a heating pad.  [employer] last reviewed this educational content on 6/1/2018 2000-2021 [employer]. All rights reserved. This information is not intended as a substitute for professional medical care. Always follow your healthcare professional's instructions.

## 2021-06-30 NOTE — PROGRESS NOTES
Assessment & Plan     Pain of finger of right hand  Jammed his right pinky finger on keyboard 3 weeks ago and continues to have swelling, pain, and stiffness. Xray 6/21/2021 no acute fracture or joint malalignment. Splint is not helping.   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 Counseled on self-care measures including: ice/heat, OTC pain medications, frequent short walks, comfortable positions; and warning signs of when to seek urgent medical care including: sudden change in bowel or bladder, fever, new numbness/tingling/weakness, worsening symptoms. Will also get labs to evaluate for dehydration as contributing source of cramping.   - Comprehensive metabolic panel  - PHYSICAL THERAPY REFERRAL; Future  - diclofenac (VOLTAREN) 1 % topical gel; Apply 2 g topically 4 times daily for 7 days  - cyclobenzaprine (FLEXERIL) 5 MG tablet; Take 1 tablet (5 mg) by mouth 3 times daily as needed for muscle spasms    Screening for HIV (human immunodeficiency virus)  Routine  - HIV Antigen Antibody Combo             See Patient Instructions    No follow-ups on file.    PATRICE [facility] KEESHA Billy is a 41 year old who presents for the following health issues     HPI     ED/UC Followup:    Facility:  [facility]   Date of visit: 6/21/2021  Reason for visit: Finger injury/Right pinky   Current Status: Patient reported still having sharp pain, red, swelling, and stiff. Patient used a finger splint, but that does not seem to help.   Jammed his right pinky finger on keyboard 3 weeks ago.      ED 6/21/2021 xray results reviewed:  Imaging:  XR Finger 3 Views Right:  1.  No acute fracture or joint malalignment.   2.  Tiny focus of calcification at the palmar margin of the  "fifth PIP joint, which could be secondary to old soft tissue or osseous injury.   3.  Fifth finger soft tissue swelling.   Results per radiology.        Back Pain  Onset/Duration: 3 days after starting back to work last Friday after being out of work for a year due to the COVID pandemic.   Description:   Location of pain: entire back. His job is outdoors and recently been having back pain cramps.  Character of pain: \"It aches.\"   Pain radiation: none  New numbness or weakness in legs, not attributed to pain: no   Is worried that he is dehydrated because he works outside in construction and has noticed that the back cramping is worse around the noon hour. Drinking water and Gatorade is not helpful. Does a lot of heavy lifting, all day long moving bricks up and down and left and right. Better at night when he is home resting. Urine is very dark during the day and will get lighter yellow in the evening after he drinks a lot of water.  Therapies tried and outcome: Stretches, does not seem to help.   Accompanying Signs & Symptoms:  Risk of Fracture: None  Risk of Cauda Equina: None  Risk of Infection: None  Risk of Cancer: None  Risk of Ankylosing Spondylitis: Onset at age <35, male, AND morning back stiffness no    Review of Systems   Constitutional, HEENT, cardiovascular, pulmonary, gi and gu systems are negative, except as otherwise noted.      Objective    /72 (BP Location: Right arm, Patient Position: Sitting, Cuff Size: Adult Large)   Pulse 111   Temp 98.8  F (37.1  C) (Oral)   Resp 20   Ht 1.635 m (5' 4.37\")   Wt 89.1 kg (196 lb 8 oz)   SpO2 96%   BMI 33.34 kg/m    Body mass index is 33.34 kg/m .  Physical Exam   GENERAL: healthy, alert and no distress  RESP: lungs clear to auscultation - no rales, rhonchi or wheezes  CV: regular rate and rhythm, normal S1 S2, no S3 or S4, no murmur, click or rub, no peripheral edema and peripheral pulses strong  MS: decreased range of motion right 5th finger with " edema and tenderness to palpation.  NEURO: Normal strength and tone, sensory exam grossly normal and mentation intact  PSYCH: mentation appears normal, affect normal/bright

## 2021-07-01 LAB
ALBUMIN SERPL-MCNC: 4.4 G/DL (ref 3.4–5)
ALP SERPL-CCNC: 58 U/L (ref 40–150)
ALT SERPL W P-5'-P-CCNC: 56 U/L (ref 0–70)
ANION GAP SERPL CALCULATED.3IONS-SCNC: 7 MMOL/L (ref 3–14)
AST SERPL W P-5'-P-CCNC: 31 U/L (ref 0–45)
BILIRUB SERPL-MCNC: 0.5 MG/DL (ref 0.2–1.3)
BUN SERPL-MCNC: 17 MG/DL (ref 7–30)
CALCIUM SERPL-MCNC: 8.4 MG/DL (ref 8.5–10.1)
CHLORIDE SERPL-SCNC: 104 MMOL/L (ref 94–109)
CO2 SERPL-SCNC: 24 MMOL/L (ref 20–32)
CREAT SERPL-MCNC: 1.07 MG/DL (ref 0.66–1.25)
GFR SERPL CREATININE-BSD FRML MDRD: 86 ML/MIN/{1.73_M2}
GLUCOSE SERPL-MCNC: 148 MG/DL (ref 70–99)
HIV 1+2 AB+HIV1 P24 AG SERPL QL IA: NONREACTIVE
POTASSIUM SERPL-SCNC: 3.8 MMOL/L (ref 3.4–5.3)
PROT SERPL-MCNC: 7.6 G/DL (ref 6.8–8.8)
SODIUM SERPL-SCNC: 135 MMOL/L (ref 133–144)

## 2021-07-06 ENCOUNTER — TELEPHONE (OUTPATIENT)
Dept: FAMILY MEDICINE | Facility: CLINIC | Age: 41
End: 2021-07-06

## 2021-07-06 NOTE — TELEPHONE ENCOUNTER
Prior Authorization Retail Medication Request    Medication/Dose: diclofenac (VOLTAREN) 1 % topical gel  ICD code (if different than what is on RX):  M54.9      Insurance Name:  [employer] [place]   Insurance ID:  [id number]       Pharmacy Information (if different than what is on RX)  Name:  [facility]  Phone:  [phone]

## 2021-07-07 ENCOUNTER — OFFICE VISIT (OUTPATIENT)
Dept: ORTHOPEDICS | Facility: CLINIC | Age: 41
End: 2021-07-07
Attending: NURSE PRACTITIONER
Payer: COMMERCIAL

## 2021-07-07 VITALS
BODY MASS INDEX: 33.46 KG/M2 | DIASTOLIC BLOOD PRESSURE: 91 MMHG | HEIGHT: 64 IN | OXYGEN SATURATION: 98 % | HEART RATE: 97 BPM | WEIGHT: 196 LBS | SYSTOLIC BLOOD PRESSURE: 135 MMHG

## 2021-07-07 DIAGNOSIS — M79.644 PAIN OF FINGER OF RIGHT HAND: Primary | ICD-10-CM

## 2021-07-07 DIAGNOSIS — M20.021 ACQUIRED BOUTONNIERE DEFORMITY OF FINGER OF RIGHT HAND: ICD-10-CM

## 2021-07-07 PROCEDURE — 99243 OFF/OP CNSLTJ NEW/EST LOW 30: CPT | Performed by: ORTHOPAEDIC SURGERY

## 2021-07-07 ASSESSMENT — PAIN SCALES - GENERAL: PAINLEVEL: EXTREME PAIN (9)

## 2021-07-07 ASSESSMENT — MIFFLIN-ST. JEOR: SCORE: 1710.92

## 2021-07-07 NOTE — PROGRESS NOTES
SUBJECTIVE:   Wenceslao Hutson is a 41 year old male who is seen in consultation at the request of Ashley Klein for evaluation of right 5th finger pain.  Problems noted for 4 weeks.  Jammed his right pinky finger on keyboard and continues to have swelling, pain, and stiffness. Xray 6/21/2021 no acute fracture or joint malalignment. Splint is not helping.     Present symptoms: pain and swelling PIP of right 5th finger. Pain to flex.    He works finishing brick work.     Treatments tried to this point: alumafoam splint    Relevant Orthopedic PMH:     Past Medical History:   Past Medical History:   Diagnosis Date     Chronic tonsillitis      Past Surgical History:   Past Surgical History:   Procedure Laterality Date     SHOULDER SURGERY  2007-Left     TONSILLECTOMY  12/22/2011    Procedure:TONSILLECTOMY; Tonsillectomy; Surgeon:NAA CASIANO; Location: OR     Family History:   Family History   Problem Relation Age of Onset     Breast Cancer Mother      Social History:   Social History     Tobacco Use     Smoking status: Former Smoker     Years: 3.00     Types: Cigarettes     Start date: 6/15/2005     Smokeless tobacco: Former User     Quit date: 12/22/2011     Tobacco comment: smoke free household.   Substance Use Topics     Alcohol use: Yes     Comment: on the weekends       Review of Systems:  Constitutional:  NEGATIVE for fever, chills, change in weight  Integumentary/Skin:  NEGATIVE for worrisome rashes, moles or lesions  Eyes:  NEGATIVE for vision changes or irritation  ENT/Mouth:  NEGATIVE for ear, mouth and throat problems  Resp:  NEGATIVE for significant cough or SOB  Breast:  NEGATIVE for masses, tenderness or discharge  CV:  NEGATIVE for chest pain, palpitations or peripheral edema  GI:  NEGATIVE for nausea, abdominal pain, heartburn, or change in bowel habits  :  Negative   Musculoskeletal:  See HPI above  Neuro:  NEGATIVE for weakness, dizziness.  Reports numbness/tingling all fingers bilateral  "hands.  Endocrine:  NEGATIVE for temperature intolerance, skin/hair changes  Heme/allergy/immune:  NEGATIVE for bleeding problems  Psychiatric:  NEGATIVE for changes in mood or affect    OBJECTIVE:  Physical Exam:  BP (!) 135/91 (BP Location: Left arm, Patient Position: Sitting, Cuff Size: Adult Regular)   Pulse 97   Ht 1.635 m (5' 4.37\")   Wt 88.9 kg (196 lb)   SpO2 98%   BMI 33.26 kg/m    General Appearance: healthy, alert and no distress   Skin: no suspicious lesions or rashes  Neuro: Normal strength and tone, mentation intact and speech normal. Sensation intact in fingers today.  Vascular: good pulses, and cappillary refill   Lymph: no lymphadenopathy   Psych:  mentation appears normal and affect normal/bright  Resp: no increased work of breathing     Right Hand Exam:  Inspection: mild flexion deformity of the PIP, can't fully extend the PIP actively with swelling.  Masses: none  ROM: unable to make a full fist with 5th finger  Tenderness: PIP joint   Collateral ligament stability is good  Good flexor profundus function, but has pain. Normal DIP extension strength    X-rays:  Obtained 6/21/21 elsewhere.  Not available to view. Reportedly normal :  1.  No acute fracture or joint malalignment.   2.  Tiny focus of calcification at the palmar margin of the fifth PIP joint, which could be secondary to old soft tissue or osseous injury.   3.  Fifth finger soft tissue swelling.   Results per radiology.        ASSESSMENT:   Encounter Diagnoses   Name Primary?     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    AVRIL Marquez MD  Dept. Orthopedic Surgery  [facility]     "

## 2021-07-07 NOTE — LETTER
7/7/2021         RE: Wenceslao Hutson  [address] [facility] [address]        Dear Colleague,    Thank you for referring your patient, Wenceslao Htuson, to the [facility]. Please see a copy of my visit note below.    SUBJECTIVE:   Wenceslao Hutson is a 41 year old male who is seen in consultation at the request of Ashley Klien for evaluation of right 5th finger pain.  Problems noted for 4 weeks.  Jammed his right pinky finger on keyboard and continues to have swelling, pain, and stiffness. Xray 6/21/2021 no acute fracture or joint malalignment. Splint is not helping.     Present symptoms: pain and swelling PIP of right 5th finger     Treatments tried to this point: alumafoam splint    Relevant Orthopedic PMH:     Past Medical History:   Past Medical History:   Diagnosis Date     Chronic tonsillitis      Past Surgical History:   Past Surgical History:   Procedure Laterality Date     SHOULDER SURGERY  2007-Left     TONSILLECTOMY  12/22/2011    Procedure:TONSILLECTOMY; Tonsillectomy; Surgeon:NAA CASIANO; Location: OR     Family History:   Family History   Problem Relation Age of Onset     Breast Cancer Mother      Social History:   Social History     Tobacco Use     Smoking status: Former Smoker     Years: 3.00     Types: Cigarettes     Start date: 6/15/2005     Smokeless tobacco: Former User     Quit date: 12/22/2011     Tobacco comment: smoke free household.   Substance Use Topics     Alcohol use: Yes     Comment: on the weekends       Review of Systems:  Constitutional:  NEGATIVE for fever, chills, change in weight  Integumentary/Skin:  NEGATIVE for worrisome rashes, moles or lesions  Eyes:  NEGATIVE for vision changes or irritation  ENT/Mouth:  NEGATIVE for ear, mouth and throat problems  Resp:  NEGATIVE for significant cough or SOB  Breast:  NEGATIVE for masses, tenderness or discharge  CV:  NEGATIVE for chest pain, palpitations or peripheral edema  GI:  NEGATIVE for  "nausea, abdominal pain, heartburn, or change in bowel habits  :  Negative   Musculoskeletal:  See HPI above  Neuro:  NEGATIVE for weakness, dizziness or paresthesias  Endocrine:  NEGATIVE for temperature intolerance, skin/hair changes  Heme/allergy/immune:  NEGATIVE for bleeding problems  Psychiatric:  NEGATIVE for changes in mood or affect    OBJECTIVE:  Physical Exam:  BP (!) 135/91 (BP Location: Left arm, Patient Position: Sitting, Cuff Size: Adult Regular)   Pulse 97   Ht 1.635 m (5' 4.37\")   Wt 88.9 kg (196 lb)   SpO2 98%   BMI 33.26 kg/m    General Appearance: healthy, alert and no distress   Skin: no suspicious lesions or rashes  Neuro: Normal strength and tone, mentation intact and speech normal  Vascular: good pulses, and cappillary refill   Lymph: no lymphadenopathy   Psych:  mentation appears normal and affect normal/bright  Resp: no increased work of breathing     Right Hand Exam:  Inspection: mild flexion deformity of the PIP, can't fully extend the PIP actively with swelling.  Masses: none  ROM: unable to make a full fist with 5th finger  Tenderness: PIP joint   Collateral ligament stability is good  Good flexor profundus function, but has pain. Normal DIP extension strength    X-rays:  Obtained 6/21/21 elsewhere.  Not available to view. Reportedly normal :  1.  No acute fracture or joint malalignment.   2.  Tiny focus of calcification at the palmar margin of the fifth PIP joint, which could be secondary to old soft tissue or osseous injury.   3.  Fifth finger soft tissue swelling.   Results per radiology.        ASSESSMENT:   No diagnosis found.   Extensor chau disruption right 5th PIP joint  Early boutonierre    PLAN:   Boutonierre program--Hand therapy/Occ therapy  Oval 8 brace full time, off for hygiene, on PIP joint  Encouraged DIP range of motion   I think he can use these splints on the job  Nsaids, ice as needed.    AVRIL Marquez MD  Dept. Orthopedic Surgery  [facility] "         Again, thank you for allowing me to participate in the care of your patient.        Sincerely,        Sven Marquez MD

## 2021-07-07 NOTE — TELEPHONE ENCOUNTER
Central Prior Authorization Team   Phone: [phone]        PA Initiation    Medication: diclofenac 1 %  Insurance Company: [employer] [place] - Phone [phone] Fax [phone]  Pharmacy Filling the Rx: [id number] - [place] [address] [address] AT [facility] OF [address]  Filling Pharmacy Phone: [phone]  Filling Pharmacy Fax:    Start Date: 7/7/2021

## 2021-07-08 NOTE — TELEPHONE ENCOUNTER
PRIOR AUTHORIZATION DENIED    Medication: diclofenac 1 %    Denial Date: 7/8/2021    Denial Rational:                Appeal Information:    If you would like to appeal, please supply P/A team with a letter of medical necessity with clinical reason.

## 2021-07-09 NOTE — TELEPHONE ENCOUNTER
Left message on patient's identified voice mail.  Advised patient to call [phone] at his earliest convenience and speak to one of the nurses.    Sent [employer] message.

## 2021-07-15 NOTE — TELEPHONE ENCOUNTER
7/9/2021  8:32 AM Y Ethel Lazaro RN Patient Medical Advice Request  Insurance coverage for diclofenac (VOLTAREN) 1 % topical gel     Patient read [employer] message, encounter closed    Adelia Winchester RN on 7/15/2021 at 8:55 AM

## 2021-07-20 ENCOUNTER — OFFICE VISIT (OUTPATIENT)
Dept: OPTOMETRY | Facility: CLINIC | Age: 41
End: 2021-07-20
Payer: COMMERCIAL

## 2021-07-20 ENCOUNTER — APPOINTMENT (OUTPATIENT)
Dept: OPTOMETRY | Facility: CLINIC | Age: 41
End: 2021-07-20
Payer: COMMERCIAL

## 2021-07-20 DIAGNOSIS — H52.13 MYOPIA OF BOTH EYES: ICD-10-CM

## 2021-07-20 DIAGNOSIS — E11.9 TYPE 2 DIABETES MELLITUS WITHOUT RETINOPATHY (H): ICD-10-CM

## 2021-07-20 DIAGNOSIS — H52.223 REGULAR ASTIGMATISM OF BOTH EYES: ICD-10-CM

## 2021-07-20 DIAGNOSIS — Z01.01 ENCOUNTER FOR EXAMINATION OF EYES AND VISION WITH ABNORMAL FINDINGS: Primary | ICD-10-CM

## 2021-07-20 PROCEDURE — V2100 LENS SPHER SINGLE PLANO 4.00: HCPCS | Mod: LT | Performed by: OPTOMETRIST

## 2021-07-20 PROCEDURE — 92015 DETERMINE REFRACTIVE STATE: CPT | Performed by: OPTOMETRIST

## 2021-07-20 PROCEDURE — 92004 COMPRE OPH EXAM NEW PT 1/>: CPT | Performed by: OPTOMETRIST

## 2021-07-20 PROCEDURE — V2020 VISION SVCS FRAMES PURCHASES: HCPCS | Performed by: OPTOMETRIST

## 2021-07-20 ASSESSMENT — CONF VISUAL FIELD
OS_NORMAL: 1
OD_NORMAL: 1
METHOD: COUNTING FINGERS

## 2021-07-20 ASSESSMENT — VISUAL ACUITY
OD_SC: 20/40
OD_SC: 20/20
OS_SC: 20/20
METHOD: SNELLEN - LINEAR
OD_SC+: -2
OS_SC: 20/125

## 2021-07-20 ASSESSMENT — REFRACTION_MANIFEST
OD_CYLINDER: +0.25
OD_SPHERE: -0.75
OS_CYLINDER: +0.50
OS_AXIS: 161
OD_AXIS: 043
OD_AXIS: 033
OS_CYLINDER: +0.75
OS_SPHERE: -3.50
OS_SPHERE: -4.00
OS_AXIS: 156
OD_SPHERE: -2.25
METHOD_AUTOREFRACTION: 1
OD_CYLINDER: +0.50

## 2021-07-20 ASSESSMENT — TONOMETRY
IOP_METHOD: APPLANATION
OD_IOP_MMHG: 18
OS_IOP_MMHG: 18

## 2021-07-20 ASSESSMENT — EXTERNAL EXAM - LEFT EYE: OS_EXAM: NORMAL

## 2021-07-20 ASSESSMENT — SLIT LAMP EXAM - LIDS
COMMENTS: NORMAL
COMMENTS: NORMAL

## 2021-07-20 ASSESSMENT — CUP TO DISC RATIO
OD_RATIO: 0.25
OS_RATIO: 0.2

## 2021-07-20 ASSESSMENT — EXTERNAL EXAM - RIGHT EYE: OD_EXAM: NORMAL

## 2021-07-20 NOTE — PROGRESS NOTES
Chief Complaint   Patient presents with     Diabetic Eye Exam        Hemoglobin A1C   Date Value Ref Range Status   05/12/2021 12.1 (H) 0 - 5.6 % Final     Comment:     Normal <5.7% Prediabetes 5.7-6.4%  Diabetes 6.5% or higher - adopted from ADA   consensus guidelines.     01/11/2021 6.6 (H) 0 - 5.6 % Final     Comment:     Normal <5.7% Prediabetes 5.7-6.4%  Diabetes 6.5% or higher - adopted from ADA   consensus guidelines.     02/11/2020 5.6 0 - 5.6 % Final     Comment:     Normal <5.7% Prediabetes 5.7-6.4%  Diabetes 6.5% or higher - adopted from ADA   consensus guidelines.         Last Eye Exam: 3 years  Dilated Previously: Yes, side effects of dilation explained today    What are you currently using to see?  does not use glasses or contacts - broke glasses about 3 years ago - hasn't used any since    Distance Vision Acuity: Noticed gradual change in both eyes    Near Vision Acuity: Not satisfied     Eye Comfort: Burning occasionally  Do you use eye drops? : No    Phyllis Burger     Medical, surgical and family histories reviewed and updated 7/20/2021.       OBJECTIVE: See Ophthalmology exam    ASSESSMENT:    ICD-10-CM    1. Encounter for examination of eyes and vision with abnormal findings  Z01.01    2. Type 2 diabetes mellitus without retinopathy (H)  E11.9    3. Myopia of both eyes  H52.13    4. Regular astigmatism of both eyes  H52.223       PLAN:    Wenceslao Hutson aware  eye exam results will be sent to Casey Sheldon.  Patient Instructions   Patient educated on importance of good blood sugar control.  Letter sent to primary care provider with diabetic eye exam report.     Wenceslao was advised of today's exam findings.  Fill glasses prescription  Allow 2 weeks to adapt to change in glasses  Wear glasses for driving  Copy of glasses Rx provided today.    Return in 1 year for eye exam, or sooner if needed.    The effects of the dilating drops last for 4- 6 hours.  You will be more sensitive to light and  vision will be blurry up close.  Mydriatic sunglasses were given if needed.    Eliezer Lo O.D.  [address]  [place]  [id number] [phone]

## 2021-07-20 NOTE — PATIENT INSTRUCTIONS
Patient educated on importance of good blood sugar control.  Letter sent to primary care provider with diabetic eye exam report.     Wenceslao was advised of today's exam findings.  Fill glasses prescription  Allow 2 weeks to adapt to change in glasses  Wear glasses for driving  Copy of glasses Rx provided today.    Return in 1 year for eye exam, or sooner if needed.    The effects of the dilating drops last for 4- 6 hours.  You will be more sensitive to light and vision will be blurry up close.  Mydriatic sunglasses were given if needed.    Eliezer Lo O.D.  [address]  [place]  [address]    [phone]

## 2021-07-20 NOTE — LETTER
7/20/2021         RE: Wenceslao Hutson  [address] [facility] [address]        Dear Colleague,    Thank you for referring your patient, Wenceslao Hutson, to the [facility]. Please see a copy of my visit note below.    Chief Complaint   Patient presents with     Diabetic Eye Exam        Hemoglobin A1C   Date Value Ref Range Status   05/12/2021 12.1 (H) 0 - 5.6 % Final     Comment:     Normal <5.7% Prediabetes 5.7-6.4%  Diabetes 6.5% or higher - adopted from ADA   consensus guidelines.     01/11/2021 6.6 (H) 0 - 5.6 % Final     Comment:     Normal <5.7% Prediabetes 5.7-6.4%  Diabetes 6.5% or higher - adopted from ADA   consensus guidelines.     02/11/2020 5.6 0 - 5.6 % Final     Comment:     Normal <5.7% Prediabetes 5.7-6.4%  Diabetes 6.5% or higher - adopted from ADA   consensus guidelines.         Last Eye Exam: 3 years  Dilated Previously: Yes, side effects of dilation explained today    What are you currently using to see?  does not use glasses or contacts - broke glasses about 3 years ago - hasn't used any since    Distance Vision Acuity: Noticed gradual change in both eyes    Near Vision Acuity: Not satisfied     Eye Comfort: Burning occasionally  Do you use eye drops? : No    Phyllis Jennyfer     Medical, surgical and family histories reviewed and updated 7/20/2021.       OBJECTIVE: See Ophthalmology exam    ASSESSMENT:    ICD-10-CM    1. Encounter for examination of eyes and vision with abnormal findings  Z01.01    2. Type 2 diabetes mellitus without retinopathy (H)  E11.9    3. Myopia of both eyes  H52.13    4. Regular astigmatism of both eyes  H52.223       PLAN:    Wenceslao Haresh aware  eye exam results will be sent to Casey Sheldon.  Patient Instructions   Patient educated on importance of good blood sugar control.  Letter sent to primary care provider with diabetic eye exam report.     Wenceslao was advised of today's exam findings.  Fill glasses prescription  Allow 2  weeks to adapt to change in glasses  Wear glasses for driving  Copy of glasses Rx provided today.    Return in 1 year for eye exam, or sooner if needed.    The effects of the dilating drops last for 4- 6 hours.  You will be more sensitive to light and vision will be blurry up close.  Mydriatic sunglasses were given if needed.    Eliezer Lo O.D.  [address]  [place]  [address]    [phone]               Again, thank you for allowing me to participate in the care of your patient.        Sincerely,        Eliezer Lo, OD

## 2021-07-26 ENCOUNTER — THERAPY VISIT (OUTPATIENT)
Dept: OCCUPATIONAL THERAPY | Facility: CLINIC | Age: 41
End: 2021-07-26
Attending: ORTHOPAEDIC SURGERY
Payer: COMMERCIAL

## 2021-07-26 DIAGNOSIS — M79.644 PAIN OF FINGER OF RIGHT HAND: Primary | ICD-10-CM

## 2021-07-26 DIAGNOSIS — M25.641 STIFFNESS OF FINGER JOINT OF RIGHT HAND: ICD-10-CM

## 2021-07-26 PROCEDURE — 97165 OT EVAL LOW COMPLEX 30 MIN: CPT | Mod: GO | Performed by: OCCUPATIONAL THERAPIST

## 2021-07-26 PROCEDURE — 97760 ORTHOTIC MGMT&TRAING 1ST ENC: CPT | Mod: GO | Performed by: OCCUPATIONAL THERAPIST

## 2021-07-26 PROCEDURE — 97110 THERAPEUTIC EXERCISES: CPT | Mod: GO | Performed by: OCCUPATIONAL THERAPIST

## 2021-07-26 NOTE — PROGRESS NOTES
Hand Therapy Initial Evaluation    Current Date:  7/26/2021    Diagnosis: R SF PIPj injury/Extensor chau disruption right 5th PIP joint/Early boutonierre   Also B hand numbness / tingling  DOI: ~June 7, 2021    Post:  ~7w     Precautions: none    Subjective:  Wenceslao Hutson is a 41 year old male.    Patient reports symptoms of the right SF which occurred due to jammed it on keyboard. Since onset symptoms are Unchanged  General health as reported by patient is good.   Pertinent medical history includes:DM; HTN  Medical allergies:none.  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 Also B hand numbness / tingling  Special tests:  x-ray.    Previous treatment: oval 8  Barriers include:none  Mobility: No difficulty  Transportation: drives  Currently working in normal job without restrictions      Objective:  Pain Level (Scale 0-10)   7/26/2021   At Rest 9   With Use 10     Pain Description  Date 7/26/2021   Location small finger PIPj, also B hand paresthesias at finger tips   Pain Quality Aching and Tender   Frequency intermittent     Pain is worst  daytime or nighttime   Exacerbated by  flexion   Relieved by rest   Progression Not improving       Sensation   Pt reports b finger tip numbness / tingling    ROM  small Finger 7/26/2021 7/26/2021   AROM (PROM) R L   MCP HE/59 HE/90   PIP 5/50 /90   DIP /20 /79   LEIVA         Landen s Test:   An abnormal test indicates disruption of the central slip of the extensor tendon    7/26/2021    Right  Small finger: Mild tautness to the DIP, some active ext available of the PIP. This indicates  the central slip of the extensor tendon is most likely intact       Edema (Circumference measured in cm)   7/26/2021 7/26/2021   small R L   P1 6.7 5.5   PIP 6.3 5.8   P2 5 4.7     Strength  Testing deferred    Assessment:  Patient presents with symptoms consistent with diagnosis of right small finger  R SF PIPj injury (possible extensor chau disruption / boutonierre). He also reports bilateral hand numbness / tingling. Pt is participating in conservative treatment.    Patient's limitations or Problem List includes:  Pain, Decreased ROM/motion, Increased edema and Weakness of the right small finger which interferes with the patient's ability to perform Self Care Tasks , Work Tasks, Sleep Patterns, Recreational Activities, Household Chores and Driving  as compared to previous level of function.    Rehab Potential:  Excellent - Return to full activity, no limitations    Patient will benefit from skilled Occupational Therapy to increase ROM and decrease edema and paresthesias to return to previous activity level and resume normal daily tasks and to reach their rehab potential.    Barriers to Learning:  No barrier    Communication Issues:  Patient appears to be able to clearly communicate and understand verbal and written communication and follow directions correctly.    Chart Review: Brief history including review of medical and/or therapy records relating to the presenting problem and Simple history review with patient    Identified Performance Deficits: dressing, hygiene and grooming, home establishment and management, meal preparation and cleanup, work and leisure activities    Assessment of Occupational Performance:  3-5 Performance Deficits    Clinical Decision Making (Complexity): Low complexity    Treatment Explanation:  The following has been discussed with the patient:  RX ordered/plan of care  Anticipated  outcomes  Possible risks and side effects    Plan:  Frequency:  1 X week, once daily  Duration:  for 4 weeks tapering to 2 X a month over 4 weeks    Treatment Plan:   Modalities:  US  Therapeutic Exercise:  AROM, Tendon Gliding, Isotonics, Isometrics and Stabilization  Neuromuscular re-education:  Coordination/Dexterity and Proprioceptive Training  Manual Techniques:  Joint mobilization, Friction massage, Myofascial release and Manual edema mobilization  Orthotic Fabrication:  Static and Finger based  Self Care:  Self Care Tasks, Ergonomic Considerations and Work Tasks    Discharge Plan:  Achieve all LTG.  Independent in home treatment program.  Reach maximal therapeutic benefit.    Home Program: See flowsheet    Next visit/ Plan: See flowsheet    Thank you for the opportunity to work with this patient.   If you have questions or concerns, please contact me with an in basket message or via the information below.     Emilia Winston MS, OTR/L, CHT  Certified Hand Therapist    Email: [email or web address]   Phone / Voicemail: [phone]     Hand Therapy  phone: [phone] (staffed M-F)  Hand Hotline (urgent hand therapy questions): [phone]  Fax: [phone]    [facility] - Hand Therapy  Clinics and [facility] Center  [address] [facility], [address] [place] [address]

## 2021-08-02 ENCOUNTER — THERAPY VISIT (OUTPATIENT)
Dept: OCCUPATIONAL THERAPY | Facility: CLINIC | Age: 41
End: 2021-08-02
Payer: COMMERCIAL

## 2021-08-02 DIAGNOSIS — M25.641 STIFFNESS OF FINGER JOINT OF RIGHT HAND: Primary | ICD-10-CM

## 2021-08-02 PROCEDURE — 97035 APP MDLTY 1+ULTRASOUND EA 15: CPT | Mod: GO | Performed by: OCCUPATIONAL THERAPIST

## 2021-08-02 PROCEDURE — 97110 THERAPEUTIC EXERCISES: CPT | Mod: GO | Performed by: OCCUPATIONAL THERAPIST

## 2021-08-02 PROCEDURE — 97535 SELF CARE MNGMENT TRAINING: CPT | Mod: GO | Performed by: OCCUPATIONAL THERAPIST

## 2021-08-02 NOTE — PROGRESS NOTES
SOAP note information for 8/2/2021.  Please refer to the daily flowsheet for treatment today, total treatment time and time spent performing 1:1 timed codes.       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)    Objective:  Pain Level (Scale 0-10)   7/26/2021 8/2/2021     At Rest 9  same   With Use 10      Pain Description  Date 7/26/2021   Location small finger PIPj, also B hand paresthesias at finger tips   Pain Quality Aching and Tender   Frequency intermittent     Pain is worst  daytime or nighttime   Exacerbated by  flexion   Relieved by rest   Progression Not improving       Sensation   Pt reports bilateral finger tip numbness / tingling    ROM  small Finger 7/26/2021 7/26/2021 8/2/2021     AROM (PROM) R L R   MCP HE/59 HE/90 /83   PIP 5/50 /90 17/77   DIP /20 /79 /43   LEIVA          Landen s Test:   7/26/2021   Right  Small finger: Mild tautness to the DIP, some active ext available of the PIP. This indicates  the central slip of the extensor tendon is most likely intact     VISUAL INSPECTION:  DATE 8/2/2021    Right   General posture of the upper extremity: [x]   Normal   []  Comments:   Joint alignment: []   Normal   [x]  Comments: mild flexion of the PIP joint at rest, no DIP joint hyperextension noted -patient does have DIP joint flexion actively with mild stiffness   Color:  []   Normal   [x]  Comments: The radial portion of the small finger PIP joint is somewhat pinkish - red   Joint enlargements:  []   Normal   [x]  Comments: Mild/moderate edema to the radial portion of the small finger PIP joint   Skin Appearance: [x]   Normal   []  Comments:   Other observations:  PIPj edema  remains post session, but skin is pink/ normal in color           Edema (Circumference measured in cm)   7/26/2021 7/26/2021   small R L   P1 6.7 5.5   PIP 6.3 5.8   P2 5 4.7     Strength  Testing deferred

## 2021-08-03 ENCOUNTER — APPOINTMENT (OUTPATIENT)
Dept: OPTOMETRY | Facility: CLINIC | Age: 41
End: 2021-08-03
Payer: COMMERCIAL

## 2021-08-03 PROCEDURE — 92340 FIT SPECTACLES MONOFOCAL: CPT | Performed by: OPTOMETRIST

## 2021-08-10 ENCOUNTER — THERAPY VISIT (OUTPATIENT)
Dept: OCCUPATIONAL THERAPY | Facility: CLINIC | Age: 41
End: 2021-08-10
Payer: COMMERCIAL

## 2021-08-10 DIAGNOSIS — M25.641 STIFFNESS OF FINGER JOINT OF RIGHT HAND: Primary | ICD-10-CM

## 2021-08-10 PROCEDURE — 99207 PR NO CHARGE LOS: CPT | Mod: GO | Performed by: OCCUPATIONAL THERAPIST

## 2021-08-10 NOTE — PROGRESS NOTES
8/10/2021  Note: Provider was running ~ 8 minutes late. Pt had checked in 16 minutes early. Pt was not in lobby when provider came to lobby. Pt had not received a green badge - may have been out. Pt may have left.   Left VM for pt apologizing for my delay, and provided info about next appts.    Emilia Winston MS, OTR/L, CHT

## 2021-09-07 ENCOUNTER — OFFICE VISIT (OUTPATIENT)
Dept: FAMILY MEDICINE | Facility: CLINIC | Age: 41
End: 2021-09-07
Payer: COMMERCIAL

## 2021-09-07 VITALS
WEIGHT: 197.4 LBS | HEIGHT: 64 IN | HEART RATE: 108 BPM | BODY MASS INDEX: 33.7 KG/M2 | OXYGEN SATURATION: 96 % | SYSTOLIC BLOOD PRESSURE: 132 MMHG | RESPIRATION RATE: 19 BRPM | DIASTOLIC BLOOD PRESSURE: 89 MMHG

## 2021-09-07 DIAGNOSIS — Z79.4 TYPE 2 DIABETES MELLITUS WITHOUT COMPLICATION, WITH LONG-TERM CURRENT USE OF INSULIN (H): Primary | ICD-10-CM

## 2021-09-07 DIAGNOSIS — E11.9 TYPE 2 DIABETES MELLITUS WITHOUT COMPLICATION, WITH LONG-TERM CURRENT USE OF INSULIN (H): Primary | ICD-10-CM

## 2021-09-07 DIAGNOSIS — M79.644 PAIN OF FINGER OF RIGHT HAND: ICD-10-CM

## 2021-09-07 LAB
HBA1C MFR BLD: 6.1 % (ref 0–5.6)
HOLD SPECIMEN: NORMAL
HOLD SPECIMEN: NORMAL

## 2021-09-07 PROCEDURE — 83036 HEMOGLOBIN GLYCOSYLATED A1C: CPT | Performed by: FAMILY MEDICINE

## 2021-09-07 PROCEDURE — 99214 OFFICE O/P EST MOD 30 MIN: CPT | Performed by: FAMILY MEDICINE

## 2021-09-07 PROCEDURE — 36415 COLL VENOUS BLD VENIPUNCTURE: CPT | Performed by: FAMILY MEDICINE

## 2021-09-07 ASSESSMENT — MIFFLIN-ST. JEOR: SCORE: 1707.91

## 2021-10-02 ENCOUNTER — HEALTH MAINTENANCE LETTER (OUTPATIENT)
Age: 41
End: 2021-10-02

## 2021-10-04 ENCOUNTER — TRANSFERRED RECORDS (OUTPATIENT)
Dept: HEALTH INFORMATION MANAGEMENT | Facility: CLINIC | Age: 41
End: 2021-10-04

## 2021-12-06 PROBLEM — M25.641 STIFFNESS OF FINGER JOINT OF RIGHT HAND: Status: RESOLVED | Noted: 2021-07-26 | Resolved: 2021-12-06

## 2021-12-06 NOTE — PROGRESS NOTES
Discharge Summary - Hand Therapy    12/6/2021    Patient did not return to therapy.    This episode of care will be resolved.  Plan: Discharge from hand therapy.    Emilia Winston MS, OTR/L, CHT       Cheek Interpolation Flap Text: A decision was made to reconstruct the defect utilizing an interpolation axial flap and a staged reconstruction.  A telfa template was made of the defect.  This telfa template was then used to outline the Cheek Interpolation flap.  The donor area for the pedicle flap was then injected with anesthesia.  The flap was excised through the skin and subcutaneous tissue down to the layer of the underlying musculature.  The interpolation flap was carefully excised within this deep plane to maintain its blood supply.  The edges of the donor site were undermined.   The donor site was closed in a primary fashion.  The pedicle was then rotated into position and sutured.  Once the tube was sutured into place, adequate blood supply was confirmed with blanching and refill.  The pedicle was then wrapped with xeroform gauze and dressed appropriately with a telfa and gauze bandage to ensure continued blood supply and protect the attached pedicle.

## 2022-02-10 DIAGNOSIS — E11.9 TYPE 2 DIABETES MELLITUS WITHOUT COMPLICATION, WITHOUT LONG-TERM CURRENT USE OF INSULIN (H): ICD-10-CM

## 2022-02-11 NOTE — TELEPHONE ENCOUNTER
"Prescription approved per [facility] Refill Protocol.  Requested Prescriptions   Pending Prescriptions Disp Refills     metFORMIN (GLUCOPHAGE) 1000 MG tablet [Pharmacy Med Name: METFORMIN 1000MG TABLETS] 180 tablet 1     Sig: TAKE 1 TABLET(1000 MG) BY MOUTH TWICE DAILY WITH MEALS       Biguanide Agents Passed - 2/10/2022  6:03 AM        Passed - Patient is age 10 or older        Passed - Patient has documented A1c within the specified period of time.     If HgbA1C is 8 or greater, it needs to be on file within the past 3 months.  If less than 8, must be on file within the past 6 months.     Recent Labs   Lab Test 09/07/21  1624   A1C 6.1*             Passed - Patient's CR is NOT>1.4 OR Patient's EGFR is NOT<45 within past 12 mos.     Recent Labs   Lab Test 06/30/21  1745   GFRESTIMATED 86   GFRESTBLACK >90       Recent Labs   Lab Test 06/30/21  1745   CR 1.07             Passed - Patient does NOT have a diagnosis of CHF.        Passed - Medication is active on med list        Passed - Recent (6 mo) or future (30 days) visit within the authorizing provider's specialty     Patient had office visit in the last 6 months or has a visit in the next 30 days with authorizing provider or within the authorizing provider's specialty.  See \"Patient Info\" tab in inbasket, or \"Choose Columns\" in Meds & Orders section of the refill encounter.                 "

## 2022-02-17 PROBLEM — E11.65 TYPE 2 DIABETES MELLITUS WITH HYPERGLYCEMIA (H): Status: RESOLVED | Noted: 2018-04-19 | Resolved: 2019-10-28

## 2022-03-08 ENCOUNTER — TELEPHONE (OUTPATIENT)
Dept: FAMILY MEDICINE | Facility: CLINIC | Age: 42
End: 2022-03-08
Payer: COMMERCIAL

## 2022-03-08 NOTE — LETTER
March 8, 2022      Wenceslaoterry Hutson  [address] [address]      Your healthcare team cares about your health. To provide you with the best care,   we have reviewed your chart and based on our findings, we see that you are due to:     - DIABETES FOLLOW UP: Schedule a diabetic follow up appointment as a Office Visit. Patients with diabetes should generally see their provider every 3-6 months.    - ANNUAL WELLNESS FOLLOW UP:   Schedule an Annual Medicare Wellness Exam. This can be done by in person visit or virtual video visit.     If you have already completed these items, please contact the clinic via phone or   Mychart so your care team can review and update your records. Thank you for   choosing [facility] for your healthcare needs. For any questions,   concerns, or to schedule an appointment please contact the clinic.       Healthy Regards,      Your [facility] Care Team

## 2022-03-08 NOTE — TELEPHONE ENCOUNTER
Patient Quality Outreach    Patient is due for the following:   Diabetes -  A1C, LDL (Fasting) and Microalbumin  IVD  -  LDL (Fasting)  Physical  - due now     NEXT STEPS:   Schedule a yearly physical    Type of outreach:    Sent letter.    Next Steps:  Reach out within 90 days via Phone.    Max number of attempts reached: No. Will try again in 90 days if patient still on fail list.    Questions for provider review:    None     CHRISTINA KENNEDY, EZEKIEL  Chart routed to none, chart closed.

## 2022-03-14 DIAGNOSIS — E78.5 HYPERLIPIDEMIA ASSOCIATED WITH TYPE 2 DIABETES MELLITUS (H): ICD-10-CM

## 2022-03-14 DIAGNOSIS — E11.69 HYPERLIPIDEMIA ASSOCIATED WITH TYPE 2 DIABETES MELLITUS (H): ICD-10-CM

## 2022-03-14 DIAGNOSIS — I10 HTN, GOAL BELOW 140/90: ICD-10-CM

## 2022-03-14 NOTE — LETTER
March 17, 2022      Wenceslao Hutson  [address] [facility] [address]      Dear Wenceslao Hutson,       Your provider has sent a 1 time tomás refill of Amlodipine and Rosuvastatin. You are due for an appointment for further refills. We are currently only able to see select in person visits. We ask that you schedule a telephone visit, video visit or evisit (through [employer]) with your provider.  Please contact the clinic to schedule an appointment for further refills.     Sincerely,     Your Health Care Team

## 2022-03-15 NOTE — TELEPHONE ENCOUNTER
Last Written Amlodipine Prescription Date:  10/6/21  Last Fill Quantity: 90,  # refills: 0     Last Written Rosuvastatin Prescription Date:  10/6/21  Last Fill Quantity: 90,  # refills: 0   Last office visit: 9/7/2021 with prescribing provider:  Dr. Sheldon with advised F/U in 3 months for routine OV.  Future Office Visit: none    Routing refill request to provider for review/approval because:  Labs out of range:  Blood pressure not at goal  Allergy warning  Break in medications  Overdue for routine visit.    Med pended with reminder due for appt. Please advise.    Tamie Jernigan, RN, BSN  [facility]

## 2022-03-16 RX ORDER — ROSUVASTATIN CALCIUM 10 MG/1
10 TABLET, COATED ORAL DAILY
Qty: 90 TABLET | Refills: 0 | Status: SHIPPED | OUTPATIENT
Start: 2022-03-16 | End: 2022-08-02

## 2022-03-16 RX ORDER — AMLODIPINE BESYLATE 5 MG/1
5 TABLET ORAL DAILY
Qty: 90 TABLET | Refills: 0 | Status: SHIPPED | OUTPATIENT
Start: 2022-03-16 | End: 2022-08-02

## 2022-03-16 NOTE — TELEPHONE ENCOUNTER
TC please call pt to schedule an appointment for refills. Does not appear he is actively using his [employer] account.    Yanique TAVAREZ RN, BSN  [facility]

## 2022-03-19 ENCOUNTER — HEALTH MAINTENANCE LETTER (OUTPATIENT)
Age: 42
End: 2022-03-19

## 2022-05-11 DIAGNOSIS — E78.5 HYPERLIPIDEMIA ASSOCIATED WITH TYPE 2 DIABETES MELLITUS (H): ICD-10-CM

## 2022-05-11 DIAGNOSIS — I10 HTN, GOAL BELOW 140/90: ICD-10-CM

## 2022-05-11 DIAGNOSIS — E11.69 HYPERLIPIDEMIA ASSOCIATED WITH TYPE 2 DIABETES MELLITUS (H): ICD-10-CM

## 2022-05-11 RX ORDER — AMLODIPINE BESYLATE 5 MG/1
TABLET ORAL
Qty: 90 TABLET | Refills: 0 | OUTPATIENT
Start: 2022-05-11

## 2022-05-11 RX ORDER — ROSUVASTATIN CALCIUM 10 MG/1
TABLET, COATED ORAL
Qty: 90 TABLET | Refills: 0 | OUTPATIENT
Start: 2022-05-11

## 2022-05-11 NOTE — LETTER
[facility]  [address] [facility] [facility] [address]  [phone]          May 25, 2022    Wenceslao Hutson                                                                                                                     [address] [facility] [address]            Dear Wenceslao,    We have been unsuccessful reaching you by telephone. Please call the clinic at [phone] to schedule an appointment with a provider or let us know if you are getting care elsewhere.        Sincerely,         Casey Sheldon MD

## 2022-05-11 NOTE — TELEPHONE ENCOUNTER
"Routing refill request to provider for review/approval because:  Pamela given x1 and patient did not follow up, please advise  Labs not current:  LDL      Requested Prescriptions   Pending Prescriptions Disp Refills     amLODIPine (NORVASC) 5 MG tablet [Pharmacy Med Name: AMLODIPINE BESYLATE 5MG TABLETS] 90 tablet 0     Sig: TAKE 1 TABLET(5 MG) BY MOUTH DAILY       Calcium Channel Blockers Protocol  Passed - 5/11/2022  6:31 AM        Passed - Blood pressure under 140/90 in past 12 months     BP Readings from Last 3 Encounters:   09/07/21 132/89   07/07/21 (!) 135/91   06/30/21 113/72                 Passed - Recent (12 mo) or future (30 days) visit within the authorizing provider's specialty     Patient has had an office visit with the authorizing provider or a provider within the authorizing providers department within the previous 12 mos or has a future within next 30 days. See \"Patient Info\" tab in inbasket, or \"Choose Columns\" in Meds & Orders section of the refill encounter.              Passed - Medication is active on med list        Passed - Patient is age 18 or older        Passed - Normal serum creatinine on file in past 12 months     Recent Labs   Lab Test 06/30/21  1745   CR 1.07       Ok to refill medication if creatinine is low             rosuvastatin (CRESTOR) 10 MG tablet [Pharmacy Med Name: ROSUVASTATIN 10MG TABLETS] 90 tablet 0     Sig: TAKE 1 TABLET(10 MG) BY MOUTH DAILY       Statins Protocol Failed - 5/11/2022  6:31 AM        Failed - LDL on file in past 12 months     Recent Labs   Lab Test 01/11/21  1136   *             Passed - No abnormal creatine kinase in past 12 months     No lab results found.             Passed - Recent (12 mo) or future (30 days) visit within the authorizing provider's specialty     Patient has had an office visit with the authorizing provider or a provider within the authorizing providers department within the previous 12 mos or has a future within next 30 days. " "See \"Patient Info\" tab in inbasket, or \"Choose Columns\" in Meds & Orders section of the refill encounter.              Passed - Medication is active on med list        Passed - Patient is age 18 or older           Eulalia Vanessa RN      "

## 2022-05-12 NOTE — TELEPHONE ENCOUNTER
Called patient to schedule. No answer. If patient calls back please assist with scheduling     Parminder-

## 2022-05-14 ENCOUNTER — HEALTH MAINTENANCE LETTER (OUTPATIENT)
Age: 42
End: 2022-05-14

## 2022-07-18 DIAGNOSIS — M54.9 ACUTE BILATERAL BACK PAIN, UNSPECIFIED BACK LOCATION: ICD-10-CM

## 2022-07-20 NOTE — TELEPHONE ENCOUNTER
Last Written Prescription Date:  6/30/21  Last Fill Quantity: 56 g,  # refills: 0   Last office visit: 9/7/2021 with prescribing provider:  Dr. Sheldon with advised F/U in 3 months for Routine visit, patient did not follow up.    Future Office Visit: none    Routing refill request to provider for review/approval because:  Medication is reported/historical  Patient overdue for follow up.    Routing to provider for advise.    Tamie Jernigan, RN, BSN  [facility]

## 2022-07-21 NOTE — TELEPHONE ENCOUNTER
I called and spoke with the patient. Let him know that his prescription has been sent to the pharmacy and help him get scheduled to see Dr. Sheldon on 08/02/2022 at 4:30pm. I told the patient to arrive at 4:10pm for check in.    Thu Foster [facility]

## 2022-08-02 ENCOUNTER — OFFICE VISIT (OUTPATIENT)
Dept: FAMILY MEDICINE | Facility: CLINIC | Age: 42
End: 2022-08-02
Payer: COMMERCIAL

## 2022-08-02 VITALS
OXYGEN SATURATION: 97 % | TEMPERATURE: 99 F | SYSTOLIC BLOOD PRESSURE: 148 MMHG | HEART RATE: 87 BPM | RESPIRATION RATE: 16 BRPM | WEIGHT: 209.6 LBS | BODY MASS INDEX: 35.78 KG/M2 | HEIGHT: 64 IN | DIASTOLIC BLOOD PRESSURE: 94 MMHG

## 2022-08-02 DIAGNOSIS — E78.5 HYPERLIPIDEMIA LDL GOAL <100: ICD-10-CM

## 2022-08-02 DIAGNOSIS — E11.69 HYPERLIPIDEMIA ASSOCIATED WITH TYPE 2 DIABETES MELLITUS (H): ICD-10-CM

## 2022-08-02 DIAGNOSIS — Z13.220 SCREENING FOR HYPERLIPIDEMIA: ICD-10-CM

## 2022-08-02 DIAGNOSIS — E11.9 TYPE 2 DIABETES MELLITUS WITHOUT COMPLICATION, WITHOUT LONG-TERM CURRENT USE OF INSULIN (H): ICD-10-CM

## 2022-08-02 DIAGNOSIS — R35.0 URINARY FREQUENCY: ICD-10-CM

## 2022-08-02 DIAGNOSIS — T78.40XD ALLERGY, SUBSEQUENT ENCOUNTER: ICD-10-CM

## 2022-08-02 DIAGNOSIS — Z79.4 TYPE 2 DIABETES MELLITUS WITH HYPERGLYCEMIA, WITH LONG-TERM CURRENT USE OF INSULIN (H): Primary | ICD-10-CM

## 2022-08-02 DIAGNOSIS — K21.00 GASTROESOPHAGEAL REFLUX DISEASE WITH ESOPHAGITIS WITHOUT HEMORRHAGE: ICD-10-CM

## 2022-08-02 DIAGNOSIS — E66.01 MORBID OBESITY (H): ICD-10-CM

## 2022-08-02 DIAGNOSIS — E11.65 TYPE 2 DIABETES MELLITUS WITH HYPERGLYCEMIA, WITH LONG-TERM CURRENT USE OF INSULIN (H): Primary | ICD-10-CM

## 2022-08-02 DIAGNOSIS — E78.5 HYPERLIPIDEMIA ASSOCIATED WITH TYPE 2 DIABETES MELLITUS (H): ICD-10-CM

## 2022-08-02 DIAGNOSIS — I10 HTN, GOAL BELOW 140/90: ICD-10-CM

## 2022-08-02 LAB — HBA1C MFR BLD: 6 % (ref 0–5.6)

## 2022-08-02 PROCEDURE — G0103 PSA SCREENING: HCPCS | Performed by: FAMILY MEDICINE

## 2022-08-02 PROCEDURE — 80061 LIPID PANEL: CPT | Performed by: FAMILY MEDICINE

## 2022-08-02 PROCEDURE — 80053 COMPREHEN METABOLIC PANEL: CPT | Performed by: FAMILY MEDICINE

## 2022-08-02 PROCEDURE — 36415 COLL VENOUS BLD VENIPUNCTURE: CPT | Performed by: FAMILY MEDICINE

## 2022-08-02 PROCEDURE — 99207 PR FOOT EXAM NO CHARGE: CPT | Performed by: FAMILY MEDICINE

## 2022-08-02 PROCEDURE — 83036 HEMOGLOBIN GLYCOSYLATED A1C: CPT | Performed by: FAMILY MEDICINE

## 2022-08-02 PROCEDURE — 99214 OFFICE O/P EST MOD 30 MIN: CPT | Performed by: FAMILY MEDICINE

## 2022-08-02 PROCEDURE — 82043 UR ALBUMIN QUANTITATIVE: CPT | Performed by: FAMILY MEDICINE

## 2022-08-02 PROCEDURE — 83721 ASSAY OF BLOOD LIPOPROTEIN: CPT | Performed by: FAMILY MEDICINE

## 2022-08-02 RX ORDER — ROSUVASTATIN CALCIUM 10 MG/1
10 TABLET, COATED ORAL DAILY
Qty: 90 TABLET | Refills: 1 | Status: SHIPPED | OUTPATIENT
Start: 2022-08-02 | End: 2022-08-04

## 2022-08-02 RX ORDER — AMLODIPINE BESYLATE 5 MG/1
5 TABLET ORAL DAILY
Qty: 90 TABLET | Refills: 1 | Status: SHIPPED | OUTPATIENT
Start: 2022-08-02 | End: 2022-08-04

## 2022-08-02 ASSESSMENT — PAIN SCALES - GENERAL: PAINLEVEL: NO PAIN (0)

## 2022-08-02 NOTE — PROGRESS NOTES
Assessment & Plan     Type 2 diabetes mellitus with hyperglycemia, with long-term current use of insulin (H)   A1c at goal.  Reviewed the nature of diabetes and its complications.  Went over co morbidities, need for good BP and Cholesterol control.  Discussed the recheck schedule  - metFORMIN (GLUCOPHAGE) 1000 MG tablet; TAKE 1 TABLET(1000 MG) BY MOUTH TWICE DAILY WITH MEALS  - Albumin Random Urine Quantitative with Creat Ratio; Future  - HEMOGLOBIN A1C; Future  - FOOT EXAM  - Comprehensive metabolic panel (BMP + Alb, Alk Phos, ALT, AST, Total. Bili, TP); Future  - Comprehensive metabolic panel (BMP + Alb, Alk Phos, ALT, AST, Total. Bili, TP)  - HEMOGLOBIN A1C  - Albumin Random Urine Quantitative with Creat Ratio    HTN, goal below 140/90   BP not at goal; not been taking medication for BP; will restart medication and recheck BP in 2 weeks.  - Comprehensive metabolic panel (BMP + Alb, Alk Phos, ALT, AST, Total. Bili, TP); Future  - Comprehensive metabolic panel (BMP + Alb, Alk Phos, ALT, AST, Total. Bili, TP)  - amLODIPine (NORVASC) 5 MG tablet; Take 1 tablet (5 mg) by mouth daily    Hyperlipidemia associated with type 2 diabetes mellitus (H)    Lipid panel reflex to direct LDL Non-fasting; Future  - rosuvastatin (CRESTOR) 10 MG tablet; Take 1 tablet (10 mg) by mouth daily --,     Gastroesophageal reflux disease with esophagitis without hemorrhage    Doing Omeprazole as needed  - omeprazole (PRILOSEC) 20 MG DR capsule; Take 1 capsule (20 mg) by mouth daily    Screening for hyperlipidemia  - Lipid panel reflex to direct LDL Non-fasting  - LDL cholesterol direct    Morbid obesity (H)   Counseled to make better food choices, exercise as tolerated, and lose weight.     Allergy, subsequent encounter   Been on going would like to discuss with allergy if allergy shots appropriate  - Adult Allergy/Asthma Referral; Future    Urinary frequency  - PSA, screen; Future  - PSA, screen     BMI:   Estimated body mass index is  "35.61 kg/m  as calculated from the following:    Height as of this encounter: 1.634 m (5' 4.33\").    Weight as of this encounter: 95.1 kg (209 lb 9.6 oz).   Weight management plan: Discussed healthy diet and exercise guidelines      Return in about 2 weeks (around 8/16/2022) for Follow up for BP recheck with ancillary.    Casey Sheldon MD  [facility] KEESHA Billy is a 42 year old, presenting for the following health issues:  Diabetes and Recheck Medication   Not been taking medications: except for Metformin for past 1.5 months     HTN; Not had BP pills      BP Readings from Last 6 Encounters:   08/02/22 (!) 168/106   09/07/21 132/89   07/07/21 (!) 135/91   06/30/21 113/72   05/28/21 114/84   05/12/21 124/82     Weight:    Has gained some weight  Wt Readings from Last 4 Encounters:   08/02/22 95.1 kg (209 lb 9.6 oz)   09/07/21 89.5 kg (197 lb 6.4 oz)   07/07/21 88.9 kg (196 lb)   06/30/21 89.1 kg (196 lb 8 oz)     History of Present Illness       Diabetes:   He presents for follow up of diabetes.  He is checking home blood glucose a few times a month. He checks blood glucose before meals.  Blood glucose is never over 200 and never under 70. He is aware of hypoglycemia symptoms including confusion. He is concerned about other.  He is having excessive thirst and weight gain. The patient has had a diabetic eye exam in the last 12 months.         Hypertension: He presents for follow up of hypertension.  He does not check blood pressure  regularly outside of the clinic. Outside blood pressures have been over 140/90. He does not follow a low salt diet.       Review of Systems   Constitutional, HEENT, cardiovascular, pulmonary, gi and gu systems are negative, except as otherwise noted.      Objective    BP (!) 168/106   Pulse 87   Temp 99  F (37.2  C) (Oral)   Resp 16   Ht 1.634 m (5' 4.33\")   Wt 95.1 kg (209 lb 9.6 oz)   SpO2 97%   BMI 35.61 kg/m    Body mass index is 35.61 " kg/m .  Physical Exam   GENERAL: healthy, alert and no distress  RESP: lungs clear to auscultation - no rales, rhonchi or wheezes  CV: regular rate and rhythm, no murmur, click or rub, no peripheral edema   ABDOMEN: soft, nontender, no masses and bowel sounds normal  MS: no gross musculoskeletal defects noted, no edema  PSYCH: mentation appears normal, affect normal/bright

## 2022-08-03 LAB
ALBUMIN SERPL-MCNC: 3.9 G/DL (ref 3.4–5)
ALP SERPL-CCNC: 56 U/L (ref 40–150)
ALT SERPL W P-5'-P-CCNC: 45 U/L (ref 0–70)
ANION GAP SERPL CALCULATED.3IONS-SCNC: 6 MMOL/L (ref 3–14)
AST SERPL W P-5'-P-CCNC: 19 U/L (ref 0–45)
BILIRUB SERPL-MCNC: 0.2 MG/DL (ref 0.2–1.3)
BUN SERPL-MCNC: 12 MG/DL (ref 7–30)
CALCIUM SERPL-MCNC: 8.9 MG/DL (ref 8.5–10.1)
CHLORIDE BLD-SCNC: 109 MMOL/L (ref 94–109)
CHOLEST SERPL-MCNC: 267 MG/DL
CO2 SERPL-SCNC: 27 MMOL/L (ref 20–32)
CREAT SERPL-MCNC: 0.84 MG/DL (ref 0.66–1.25)
CREAT UR-MCNC: 178 MG/DL
FASTING STATUS PATIENT QL REPORTED: ABNORMAL
GFR SERPL CREATININE-BSD FRML MDRD: >90 ML/MIN/1.73M2
GLUCOSE BLD-MCNC: 100 MG/DL (ref 70–99)
HDLC SERPL-MCNC: 33 MG/DL
LDLC SERPL CALC-MCNC: 164 MG/DL
LDLC SERPL CALC-MCNC: ABNORMAL MG/DL
MICROALBUMIN UR-MCNC: 20 MG/L
MICROALBUMIN/CREAT UR: 11.24 MG/G CR (ref 0–17)
NONHDLC SERPL-MCNC: 234 MG/DL
POTASSIUM BLD-SCNC: 3.8 MMOL/L (ref 3.4–5.3)
PROT SERPL-MCNC: 6.9 G/DL (ref 6.8–8.8)
PSA SERPL-MCNC: 0.7 UG/L (ref 0–4)
SODIUM SERPL-SCNC: 142 MMOL/L (ref 133–144)
TRIGL SERPL-MCNC: 625 MG/DL

## 2022-08-04 RX ORDER — ROSUVASTATIN CALCIUM 10 MG/1
10 TABLET, COATED ORAL DAILY
Qty: 90 TABLET | Refills: 1 | Status: SHIPPED | OUTPATIENT
Start: 2022-08-04 | End: 2023-01-25

## 2022-08-04 RX ORDER — AMLODIPINE BESYLATE 5 MG/1
5 TABLET ORAL DAILY
Qty: 90 TABLET | Refills: 1 | Status: SHIPPED | OUTPATIENT
Start: 2022-08-04 | End: 2023-01-25

## 2022-11-28 DIAGNOSIS — E11.9 TYPE 2 DIABETES MELLITUS WITHOUT COMPLICATION, WITHOUT LONG-TERM CURRENT USE OF INSULIN (H): ICD-10-CM

## 2022-11-28 NOTE — TELEPHONE ENCOUNTER
Requested Prescriptions   Pending Prescriptions Disp Refills     insulin pen needle (31G X 6 MM) 31G X 6 MM miscellaneous 100 each 1     Sig: Use 1 pen needles daily or as directed.       There is no refill protocol information for this order        blood glucose monitoring (NO BRAND SPECIFIED) meter device kit 1 kit 0     Sig: Use to test blood sugar 1 times daily or as directed. Preferred blood glucose meter OR supplies to accompany: Blood Glucose Monitor Brands: per insurance.       There is no refill protocol information for this order        thin (NO BRAND SPECIFIED) lancets 100 each 6     Sig: Use with lanceting device. To accompany: Blood Glucose Monitor Brands: per insurance.       There is no refill protocol information for this order        blood glucose calibration (NO BRAND SPECIFIED) solution 1 each 3     Sig: To accompany: Blood Glucose Monitor Brands: per insurance.       There is no refill protocol information for this order          Last office visit: 8/2/2022 with prescribing provider:  Casey Sheldon MD   Future Office Visit:   Next 5 appointments (look out 90 days)    Dec 28, 2022  7:30 AM  (Arrive by 7:10 AM)  Provider Visit with Casey Sheldon MD  [facility] ([facility] ) [address] [facility]  [place] [address] [phone]         Poonam Llanos   [employer]

## 2022-11-29 RX ORDER — LANCETS
EACH MISCELLANEOUS
Qty: 100 EACH | Refills: 6 | Status: SHIPPED | OUTPATIENT
Start: 2022-11-29 | End: 2024-06-17

## 2022-12-01 ENCOUNTER — TELEPHONE (OUTPATIENT)
Dept: FAMILY MEDICINE | Facility: CLINIC | Age: 42
End: 2022-12-01

## 2022-12-01 DIAGNOSIS — E66.09 CLASS 1 OBESITY DUE TO EXCESS CALORIES WITH SERIOUS COMORBIDITY AND BODY MASS INDEX (BMI) OF 34.0 TO 34.9 IN ADULT: Primary | ICD-10-CM

## 2022-12-01 DIAGNOSIS — E66.811 CLASS 1 OBESITY DUE TO EXCESS CALORIES WITH SERIOUS COMORBIDITY AND BODY MASS INDEX (BMI) OF 34.0 TO 34.9 IN ADULT: Primary | ICD-10-CM

## 2022-12-01 DIAGNOSIS — E11.9 TYPE 2 DIABETES MELLITUS WITHOUT COMPLICATION, WITHOUT LONG-TERM CURRENT USE OF INSULIN (H): ICD-10-CM

## 2022-12-01 DIAGNOSIS — E11.9 TYPE 2 DIABETES MELLITUS WITHOUT COMPLICATION, WITH LONG-TERM CURRENT USE OF INSULIN (H): ICD-10-CM

## 2022-12-01 DIAGNOSIS — Z79.4 TYPE 2 DIABETES MELLITUS WITHOUT COMPLICATION, WITH LONG-TERM CURRENT USE OF INSULIN (H): ICD-10-CM

## 2022-12-01 NOTE — TELEPHONE ENCOUNTER
Signed Prescriptions:                        Disp   Refills    blood glucose (NO BRAND SPECIFIED) test st*100 st*1        Sig: Use to test blood sugar 1 times daily or as directed.           To accompany: Blood Glucose Monitor Brands: per           insurance.  Authorizing Provider: REX AGUILA

## 2022-12-01 NOTE — TELEPHONE ENCOUNTER
Medication Question or Refill        What medication are you calling about (include dose and sig)?: test strips    Controlled Substance Agreement on file:   CSA -- Patient Level:    CSA: None found at the patient level.       Who prescribed the medication?: PCP    Do you need a refill? Yes:     When did you use the medication last? It has been a long time since these were ordered and PT is now in need of them     Patient offered an appointment? Yes: 12.28.22    Do you have any questions or concerns?  No    Preferred Pharmacy:     [id number] - [place] [address] AT [place] [id number]  [facility] [address]  Phone: [phone] Fax: [phone]      Could we send this information to you in [facility] or would you prefer to receive a phone call?:   Patient would prefer a phone call   Okay to leave a detailed message?: Yes at Cell number on file:    Telephone Information:   Mobile [phone]

## 2022-12-01 NOTE — TELEPHONE ENCOUNTER
In person diabetes follow-up appointment required - lab orders placed for lab only appointment - I can offer diabetes education referral (order placed)  Michelle Roth MD

## 2022-12-01 NOTE — TELEPHONE ENCOUNTER
Medication Question or Refill        What medication are you calling about (include dose and sig)?: insulin shots    Controlled Substance Agreement on file:   CSA -- Patient Level:    CSA: None found at the patient level.       Who prescribed the medication?: PCP    Do you need a refill? Yes:   When did you use the medication last? 9 months to 1 yr ago    Patient offered an appointment? Yes: 12.28.22 scheduled    Do you have any questions or concerns?  Yes: Pt is requesting some insulin shots now. He has scheduled an a appt put his urinating frequently and wants to get insulin shots prior to appointment    Preferred Pharmacy:         Could we send this information to you in [facility] or would you prefer to receive a phone call?:   Patient would prefer a phone call   Okay to leave a detailed message?: Yes at Cell number on file:    Telephone Information:   Mobile [phone]

## 2022-12-02 NOTE — TELEPHONE ENCOUNTER
Pt notified of provider's message as written. Assisted with scheduling lab only appt for A1C.     Pt was given number for diabetic education.      Emilia Mota RN  [facility]

## 2022-12-05 ENCOUNTER — MEDICAL CORRESPONDENCE (OUTPATIENT)
Dept: HEALTH INFORMATION MANAGEMENT | Facility: CLINIC | Age: 42
End: 2022-12-05

## 2022-12-05 ENCOUNTER — TELEPHONE (OUTPATIENT)
Dept: FAMILY MEDICINE | Facility: CLINIC | Age: 42
End: 2022-12-05

## 2022-12-05 ENCOUNTER — LAB (OUTPATIENT)
Dept: LAB | Facility: CLINIC | Age: 42
End: 2022-12-05
Payer: COMMERCIAL

## 2022-12-05 DIAGNOSIS — E11.69 HYPERLIPIDEMIA ASSOCIATED WITH TYPE 2 DIABETES MELLITUS (H): Primary | ICD-10-CM

## 2022-12-05 DIAGNOSIS — E11.9 TYPE 2 DIABETES MELLITUS WITHOUT COMPLICATION, WITH LONG-TERM CURRENT USE OF INSULIN (H): Primary | ICD-10-CM

## 2022-12-05 DIAGNOSIS — Z79.4 TYPE 2 DIABETES MELLITUS WITHOUT COMPLICATION, WITH LONG-TERM CURRENT USE OF INSULIN (H): Primary | ICD-10-CM

## 2022-12-05 DIAGNOSIS — E78.5 HYPERLIPIDEMIA ASSOCIATED WITH TYPE 2 DIABETES MELLITUS (H): Primary | ICD-10-CM

## 2022-12-05 LAB — HBA1C MFR BLD: 11.7 % (ref 0–5.6)

## 2022-12-05 PROCEDURE — 36415 COLL VENOUS BLD VENIPUNCTURE: CPT

## 2022-12-05 PROCEDURE — 83036 HEMOGLOBIN GLYCOSYLATED A1C: CPT

## 2022-12-05 NOTE — TELEPHONE ENCOUNTER
Spoke with patient.    Patient calling regarding recent lab results. Patient had hemoglobin A1C labs drawn this morning with results of 11.7.     Patient is requesting lab results to be reviewed and to further advise. Patient inquiring whether insulin may be prescribed to better manage his blood sugars. Patient states he has been urinating more often and believes it is correlated with high blood glucose levels.     Patient requesting urgent message to be sent, as he would like to be placed on insulin if appropriate as soon as possible especially due to urinating frequently.     Patient requesting prescription to be sent to [facility] in [place].    Routing to covering provider pool to please advise.    Art Reynoso RN  [facility]

## 2022-12-05 NOTE — TELEPHONE ENCOUNTER
Please advise the patient that Dr. Sheldon is out of the office for a couple of days.  The patient could go on insulin, but does not necessarily need to.  I note that his hemoglobin A1c was higher than this a year ago and was similarly elevated a few years ago, so allowing Dr. Sheldon to determine appropriate therapy for the patient in 2 days when he returns would be reasonable.    Foster Csatro MD

## 2022-12-05 NOTE — TELEPHONE ENCOUNTER
Spoke with patient. Relayed provider's message as written. Patient verbalized understanding and has no further questions at this time.    Art Reynoso RN  [facility]

## 2022-12-05 NOTE — RESULT ENCOUNTER NOTE
Your A1C has increased significantly, please keep your appointment with Dr. Sheldon.   Precious Garnica PA-C

## 2022-12-05 NOTE — TELEPHONE ENCOUNTER
Okay, I will start him on Lantus insulin in Dr. Sheldon's absence.  He should start with 10 units nightly.  I will also make a referral for our diabetic education team to reach out to him to assist him with insulin dosing.  I would advise him to make an appointment to see Dr. Sheldon in a couple of weeks or so in follow-up.    Foster Castro MD

## 2022-12-05 NOTE — TELEPHONE ENCOUNTER
Called patient and notified him of message. Patient states that he would like to start insulin for the elevated A1c and would prefer to do this now instead of waiting for treatment recommendations from PCP. Cued preferred pharmacy per patient.    Norah Hutchinson RN   [facility]

## 2023-01-02 DIAGNOSIS — E11.9 TYPE 2 DIABETES MELLITUS WITHOUT COMPLICATION, WITH LONG-TERM CURRENT USE OF INSULIN (H): ICD-10-CM

## 2023-01-02 DIAGNOSIS — Z79.4 TYPE 2 DIABETES MELLITUS WITHOUT COMPLICATION, WITH LONG-TERM CURRENT USE OF INSULIN (H): ICD-10-CM

## 2023-01-02 DIAGNOSIS — E11.9 TYPE 2 DIABETES MELLITUS WITHOUT COMPLICATION, WITHOUT LONG-TERM CURRENT USE OF INSULIN (H): ICD-10-CM

## 2023-01-02 RX ORDER — INSULIN GLARGINE-YFGN 100 [IU]/ML
10 INJECTION, SOLUTION SUBCUTANEOUS AT BEDTIME
Qty: 9 ML | Refills: 0 | Status: SHIPPED | OUTPATIENT
Start: 2023-01-02 | End: 2023-01-25

## 2023-01-02 NOTE — TELEPHONE ENCOUNTER
Reason for Call:  Other prescription    Detailed comments: Per patient: He needs a medication refill for insulin I asked the patient the name of the medication but he did not provided the name. Please call back for any questions.    Phone Number Patient can be reached at: Home number on file [phone]    Best Time: Any time     Can we leave a detailed message on this number? YES    Call taken on 1/2/2023 at 12:47 PM by Anisa Frias

## 2023-01-02 NOTE — TELEPHONE ENCOUNTER
Called pharmacy they state they do have a refill on file but according to them and insurance its too earlier to fill until 1/17/2023 called patient to inform him and he states that with this insulin he has been taking 22 units because its not helping/affecting him so he has been taking more units and that's why he ran out he is wondering if we can make the units a higher dose for him?  Please advise  Thank you  Sandra WELLINGTON

## 2023-01-03 NOTE — TELEPHONE ENCOUNTER
Pt has appt schedule for   Future Appointments 1/3/2023 - 7/2/2023      Date Visit Type Length Department Provider     1/25/2023  1:30 PM OFFICE VISIT 30 min [facility] Casey Sheldon MD    Location Instructions:     [facility] is located at [address] [place] [address] [place], one mile north of the exit off of [place] [address]. From [facility], turn east on [address] [place] [address] or [facility] to access the service road that connects to the parking lot. Be sure to enter the building labeled [facility], as another [place] building is across [facility] from the clinic.&nbsp;                  Harriet Yung MA

## 2023-01-15 ENCOUNTER — HEALTH MAINTENANCE LETTER (OUTPATIENT)
Age: 43
End: 2023-01-15

## 2023-01-25 ENCOUNTER — OFFICE VISIT (OUTPATIENT)
Dept: FAMILY MEDICINE | Facility: CLINIC | Age: 43
End: 2023-01-25
Payer: COMMERCIAL

## 2023-01-25 VITALS
OXYGEN SATURATION: 98 % | DIASTOLIC BLOOD PRESSURE: 88 MMHG | WEIGHT: 191.8 LBS | HEIGHT: 64 IN | RESPIRATION RATE: 16 BRPM | SYSTOLIC BLOOD PRESSURE: 134 MMHG | BODY MASS INDEX: 32.74 KG/M2 | HEART RATE: 106 BPM

## 2023-01-25 DIAGNOSIS — R39.15 URINARY URGENCY: ICD-10-CM

## 2023-01-25 DIAGNOSIS — Z79.4 TYPE 2 DIABETES MELLITUS WITH HYPERGLYCEMIA, WITH LONG-TERM CURRENT USE OF INSULIN (H): Primary | ICD-10-CM

## 2023-01-25 DIAGNOSIS — E66.01 MORBID OBESITY (H): ICD-10-CM

## 2023-01-25 DIAGNOSIS — I10 HTN, GOAL BELOW 140/90: ICD-10-CM

## 2023-01-25 DIAGNOSIS — M25.522 LEFT ELBOW PAIN: ICD-10-CM

## 2023-01-25 DIAGNOSIS — E11.9 TYPE 2 DIABETES MELLITUS WITHOUT COMPLICATION, WITHOUT LONG-TERM CURRENT USE OF INSULIN (H): ICD-10-CM

## 2023-01-25 DIAGNOSIS — E78.5 HYPERLIPIDEMIA LDL GOAL <100: ICD-10-CM

## 2023-01-25 DIAGNOSIS — K64.4 EXTERNAL HEMORRHOIDS: ICD-10-CM

## 2023-01-25 DIAGNOSIS — E11.65 TYPE 2 DIABETES MELLITUS WITH HYPERGLYCEMIA, WITH LONG-TERM CURRENT USE OF INSULIN (H): Primary | ICD-10-CM

## 2023-01-25 LAB
ALBUMIN UR-MCNC: NEGATIVE MG/DL
APPEARANCE UR: CLEAR
BILIRUB UR QL STRIP: NEGATIVE
CHOLEST SERPL-MCNC: 193 MG/DL
COLOR UR AUTO: YELLOW
FASTING STATUS PATIENT QL REPORTED: YES
GLUCOSE UR STRIP-MCNC: >=1000 MG/DL
HBA1C MFR BLD: 11.5 % (ref 0–5.6)
HDLC SERPL-MCNC: 41 MG/DL
HGB UR QL STRIP: NEGATIVE
KETONES UR STRIP-MCNC: 15 MG/DL
LDLC SERPL CALC-MCNC: 101 MG/DL
LEUKOCYTE ESTERASE UR QL STRIP: NEGATIVE
NITRATE UR QL: NEGATIVE
NONHDLC SERPL-MCNC: 152 MG/DL
PH UR STRIP: 5.5 [PH] (ref 5–7)
SP GR UR STRIP: <=1.005 (ref 1–1.03)
TRIGL SERPL-MCNC: 254 MG/DL
UROBILINOGEN UR STRIP-ACNC: 0.2 E.U./DL

## 2023-01-25 PROCEDURE — 36415 COLL VENOUS BLD VENIPUNCTURE: CPT | Performed by: FAMILY MEDICINE

## 2023-01-25 PROCEDURE — 83036 HEMOGLOBIN GLYCOSYLATED A1C: CPT | Performed by: FAMILY MEDICINE

## 2023-01-25 PROCEDURE — 99214 OFFICE O/P EST MOD 30 MIN: CPT | Performed by: FAMILY MEDICINE

## 2023-01-25 PROCEDURE — 81003 URINALYSIS AUTO W/O SCOPE: CPT | Performed by: FAMILY MEDICINE

## 2023-01-25 PROCEDURE — 80061 LIPID PANEL: CPT | Performed by: FAMILY MEDICINE

## 2023-01-25 RX ORDER — ROSUVASTATIN CALCIUM 10 MG/1
10 TABLET, COATED ORAL DAILY
Qty: 90 TABLET | Refills: 1 | Status: SHIPPED | OUTPATIENT
Start: 2023-01-25 | End: 2024-02-13

## 2023-01-25 RX ORDER — INSULIN GLARGINE-YFGN 100 [IU]/ML
20 INJECTION, SOLUTION SUBCUTANEOUS AT BEDTIME
Qty: 15 ML | Refills: 1 | Status: SHIPPED | OUTPATIENT
Start: 2023-01-25 | End: 2023-06-23

## 2023-01-25 RX ORDER — AMLODIPINE BESYLATE 5 MG/1
5 TABLET ORAL DAILY
Qty: 90 TABLET | Refills: 1 | Status: SHIPPED | OUTPATIENT
Start: 2023-01-25 | End: 2024-01-23

## 2023-01-25 ASSESSMENT — PAIN SCALES - GENERAL: PAINLEVEL: EXTREME PAIN (9)

## 2023-01-25 NOTE — PROGRESS NOTES
Assessment & Plan     Type 2 diabetes mellitus with hyperglycemia, with long-term current use of insulin (H)  A1c not at goal. Not been taking medications regularly, missed insulin for a while, not watching diet.  Reviewed the nature of diabetes and its complications, need for compliance and diet. Went over co morbidities, need for good blood sugar control as well as BP and Cholesterol control. Referral to diabetes education given. Discussed the recheck schedule    - Adult Eye  Referral; Future  - insulin pen needle (31G X 6 MM) 31G X 6 MM miscellaneous; Use 1 pen needles daily or as directed.  - Insulin Glargine-yfgn 100 UNIT/ML SOPN; Inject 20 Units Subcutaneous At Bedtime  - [facility] Adult Diabetes Educator Referral; Future    Type 2 diabetes mellitus without complication, without long-term current use of insulin (H)  - Adult Eye  Referral; Future  - insulin pen needle (31G X 6 MM) 31G X 6 MM miscellaneous; Use 1 pen needles daily or as directed.  - Insulin Glargine-yfgn 100 UNIT/ML SOPN; Inject 20 Units Subcutaneous At Bedtime  - blood glucose (NO BRAND SPECIFIED) test strip; Use to test blood sugar 2 times daily or as directed. To accompany: Blood Glucose Monitor Brands: per insurance.  - metFORMIN (GLUCOPHAGE) 1000 MG tablet; TAKE 1 TABLET(1000 MG) BY MOUTH TWICE DAILY WITH MEALS  - [facility] Adult Diabetes Educator Referral; Future    Morbid obesity (H)    -  Counseled to make better food choices, exercise as tolerated, and lose weight.     Hyperlipidemia LDL goal <100    Needs refill for statin  - rosuvastatin (CRESTOR) 10 MG tablet; Take 1 tablet (10 mg) by mouth daily --, PLEASE SCHEDULE PRIOR TO FURTHER REFILLS.  - [facility] Adult Diabetes Educator Referral; Future    Urinary urgency   Likely from hyperglycemia  - UA reflex to Microscopic - lab collect; Future  - UA reflex to Microscopic - lab collect    HTN, goal below 140/90   BP is at goal. Refill medication.  - amLODIPine (NORVASC) 5 MG tablet;  "Take 1 tablet (5 mg) by mouth daily  - [facility] Adult Diabetes Educator Referral; Future    Left elbow pain    Consistent with lateral epicondylitis and bursitis; discussed pathophysiology and treatment with elbow exorcise, elbow brace and Tylenol/Advil as needed    Internal hemorrhoids    Seen in ER and started on Anusol HC suppository and MiraLAX. Follow-up with colorectal surgery for definitive care.    Improving at this time; if persisting will evaluate with a colonoscopy, is okay with plan.    BMI:   Estimated body mass index is 32.75 kg/m  as calculated from the following:    Height as of this encounter: 1.63 m (5' 4.17\").    Weight as of this encounter: 87 kg (191 lb 12.8 oz).   Weight management plan: Discussed healthy diet and exercise guidelines    Return in about 2 months (around 3/25/2023) for Routine Visit.    Casey Sheldon MD  [facility] KEESHA Billy is a 42 year old, presenting for the following health issues:  Diabetes   Used to pee a lot but has slowed   Still feels thirsty   Vision; same wears glasses    Meds: Not had insulin for 3 weeks. Insulin 1/2/23 10 units at bedtime  Diet: stopped meat, cut back on lots of    EtoH; 1-2 times a week takila    Wt Readings from Last 4 Encounters:   01/25/23 87 kg (191 lb 12.8 oz)   08/02/22 95.1 kg (209 lb 9.6 oz)   09/07/21 89.5 kg (197 lb 6.4 oz)   07/07/21 88.9 kg (196 lb)     Lt Elbow Pain: Having pain in the left e;lbow area; started after had been working where was throwing stuff for a very long time.    History of Present Illness       Diabetes:   He presents for follow up of diabetes.  He is checking home blood glucose a few times a month. He checks blood glucose before meals.  Blood glucose is sometimes over 200 and never under 70. When his blood glucose is low, the patient is asymptomatic for confusion, blurred vision, lethargy and reports not feeling dizzy, shaky, or weak.  He is concerned about blood sugar " "frequently over 200.  He is having excessive thirst. The patient has not had a diabetic eye exam in the last 12 months.         Hypertension: He presents for follow up of hypertension.  He does not check blood pressure  regularly outside of the clinic. Outside blood pressures have been over 140/90. He follows a low salt diet.       Hemorroids:  ED/UC Followup:  Facility:  [facility] ER   Date of visit: 1/10/2023  Reason for visit: Internal hemorrhoid - no active bleeding. (Primary Dx)  Current Status: lots of pain and feeling in the penis area   ER Recs: Patient will be started on Anusol HC suppository and MiraLAX. Follow-up with colorectal surgery for definitive care.    Urinary Urgency:     Having some discomfort in the urethra, with urgency and being unable to hold urine.   PSA from last Aug was normal.      Review of Systems   Constitutional, HEENT, cardiovascular, pulmonary, gi and gu systems are negative, except as otherwise noted.      Objective    BP (!) 142/96 (BP Location: Right arm, Cuff Size: Adult Regular)   Pulse 106   Resp 16   Ht 1.63 m (5' 4.17\")   Wt 87 kg (191 lb 12.8 oz)   SpO2 98%   BMI 32.75 kg/m    Body mass index is 32.75 kg/m .  Physical Exam   GENERAL: healthy, alert and no distress  RESP: lungs clear to auscultation - no rales, rhonchi or wheezes  CV: regular rate and rhythm, no murmur, click or rub, no peripheral edema  MS: Lt Elbow: tenderness in back of elbow and lateral epicondyl    "

## 2023-02-01 ENCOUNTER — VIRTUAL VISIT (OUTPATIENT)
Dept: EDUCATION SERVICES | Facility: CLINIC | Age: 43
End: 2023-02-01
Payer: COMMERCIAL

## 2023-02-01 DIAGNOSIS — I10 HTN, GOAL BELOW 140/90: ICD-10-CM

## 2023-02-01 DIAGNOSIS — E11.65 TYPE 2 DIABETES MELLITUS WITH HYPERGLYCEMIA, WITH LONG-TERM CURRENT USE OF INSULIN (H): ICD-10-CM

## 2023-02-01 DIAGNOSIS — E11.9 TYPE 2 DIABETES MELLITUS WITHOUT COMPLICATION, WITHOUT LONG-TERM CURRENT USE OF INSULIN (H): ICD-10-CM

## 2023-02-01 DIAGNOSIS — Z79.4 TYPE 2 DIABETES MELLITUS WITH HYPERGLYCEMIA, WITH LONG-TERM CURRENT USE OF INSULIN (H): ICD-10-CM

## 2023-02-01 DIAGNOSIS — E78.5 HYPERLIPIDEMIA LDL GOAL <100: ICD-10-CM

## 2023-02-01 PROCEDURE — G0108 DIAB MANAGE TRN  PER INDIV: HCPCS | Mod: 93 | Performed by: DIETITIAN, REGISTERED

## 2023-02-01 NOTE — LETTER
2/1/2023         RE: Wenceslao Hutson  [address] [address]        Dear Colleague,    Thank you for referring your patient, Wenceslao Hutson, to the [facility]. Please see a copy of my visit note below.    Diabetes Self-Management Education & Support    Presents for: Individual review    Type of Service: Telephone Visit    Originating Location (Patient Location): Home  Distant Location (Provider Location): [facility]  Mode of Communication:  Telephone    Telephone Visit Start Time: 12:34p  Telephone Visit End Time (telephone visit stop time): 1:24p    How would patient like to obtain AVS? MyChart    Assessment Type:   ASSESSMENT:  Wenceslao has had very high A1C, restarted on insulin just 3 days ago. He has fear of medicines, that he might get addicted to them. 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 He will stop insulin in the summer when he goes back to work. He is only eating meals once a day to lose weight, has cut out meat to help do this. Discussed healthy diet for blood sugar control & weight loss. Discussed possible meds that could help him lose weight without causing low blood sugars. Encouraged him to stay on insulin for now to get blood sugars down and discussed slow titration plan. Encouraged him to test blood sugars BID, fasting & 2 hrs after start of biggest meal of the day. He is agreeable.     Patient's most recent   Lab Results   Component Value Date    A1C 11.5 01/25/2023    A1C 12.1 05/12/2021     is not meeting goal of <7.0    Diabetes knowledge and skills assessment:   Patient is knowledgeable in diabetes management concepts related to: none    Continue education with the following diabetes management concepts: Healthy Eating, Being Active, Monitoring, Taking Medication, Problem Solving, Reducing Risks and Healthy Coping    Based on learning  "assessment above, most appropriate setting for further diabetes education would be: Group class or Individual setting.      PLAN    Check blood sugars twice daily, fasting & 2 hrs after start of biggest meal of the day  Continue Lantus 20 units daily, increase by 2 units every 3-4 days until blood sugars are around 150 in AM  Recommend use of Ozempic 0.5 mg/week, starting with 0.25 mg x first 4 weeks to help support weight loss & blood sugar control - will reach out to PCP for sign off and follow up with patient     Follow-up: 3/8/23    See Care Plan for co-developed, patient-state behavior change goals.  AVS provided for patient today.    Education Materials Provided:  [facility] Understanding Diabetes Booklet and BG Log Sheet      SUBJECTIVE/OBJECTIVE:  Presents for: Individual review  Accompanied by: Self, Other  Diabetes education in the past 24mo: No  Diabetes type: Type 2  Date of diagnosis: 6+ years  Disease course: Getting harder to manage  How confident are you filling out medical forms by yourself:: Not Assessed  Diabetes management related comments/concerns: A1C is up very high, BGs up quite high too, needing help to get them down  Transportation concerns: No  Difficulty affording diabetes medication?: Sometimes  Difficulty affording diabetes testing supplies?: Sometimes  Other concerns:: Glasses  Cultural Influences/Ethnic Background:  Choose not to answer      Diabetes Symptoms & Complications:  Fatigue: Yes  Polydipsia: Yes  Polyuria: Yes  Visual change: No  Symptom course: Worsening  Weight trend: Decreasing (Possibly intentionally)  Complications assessed today?: No    Patient Problem List and Family Medical History reviewed for relevant medical history, current medical status, and diabetes risk factors.    Vitals:  There were no vitals taken for this visit.  Estimated body mass index is 32.75 kg/m  as calculated from the following:    Height as of 1/25/23: 1.63 m (5' 4.17\").    Weight as of " 1/25/23: 87 kg (191 lb 12.8 oz).   Last 3 BP:   BP Readings from Last 3 Encounters:   01/25/23 134/88   08/02/22 (!) 148/94   09/07/21 132/89       History   Smoking Status     Former     Years: 3.00     Types: Cigarettes     Start date: 6/15/2005     Quit date: 7/20/2011   Smokeless Tobacco     Former     Quit date: 12/22/2011     Comment: smoke free household.       Labs:  Lab Results   Component Value Date    A1C 11.5 01/25/2023    A1C 12.1 05/12/2021     Lab Results   Component Value Date     08/02/2022     06/30/2021     Lab Results   Component Value Date     01/25/2023     08/02/2022     01/11/2021     HDL Cholesterol   Date Value Ref Range Status   01/11/2021 36 (L) >39 mg/dL Final     Direct Measure HDL   Date Value Ref Range Status   01/25/2023 41 >=40 mg/dL Final   ]  GFR Estimate   Date Value Ref Range Status   08/02/2022 >90 >60 mL/min/1.73m2 Final     Comment:     Effective December 21, 2021 eGFRcr in adults is calculated using the 2021 CKD-EPI creatinine equation which includes age and gender (Michaelle et al., NEJM, DOI: 10.1056/IMXFml7519750)   06/30/2021 86 >60 mL/min/[1.73_m2] Final     Comment:     Non  GFR Calc  Starting 12/18/2018, serum creatinine based estimated GFR (eGFR) will be   calculated using the Chronic Kidney Disease Epidemiology Collaboration   (CKD-EPI) equation.       GFR Estimate If Black   Date Value Ref Range Status   06/30/2021 >90 >60 mL/min/[1.73_m2] Final     Comment:      GFR Calc  Starting 12/18/2018, serum creatinine based estimated GFR (eGFR) will be   calculated using the Chronic Kidney Disease Epidemiology Collaboration   (CKD-EPI) equation.       Lab Results   Component Value Date    CR 0.84 08/02/2022    CR 1.07 06/30/2021     No results found for: MICROALBUMIN    Healthy Eating:  Healthy Eating Assessed Today: Yes  Cultural/Synagogue diet restrictions?: Yes  Meal planning/habits: Smaller portions  Meals  include: Dinner  Breakfast: skips  Lunch: skips  Dinner: 5p - Impossible whopper  Snacks: sometimes chips  Other: eats only 1 meal/day, has cut out meat to support weight loss  Beverages: Water  Has patient met with a dietitian in the past?: Yes    Being Active:  Being Active Assessed Today: Yes  Exercise:: Currently not exercising (works in construction, fairly active in the summer)  Barrier to exercise: None    Monitoring:  Monitoring Assessed Today: Yes  Did patient bring glucose meter to appointment? : No  Blood Glucose Meter: Unknown  Times checking blood sugar at home (number): 1  Times checking blood sugar at home (per): Day  Blood glucose trend: Fluctuating    Blood sugars - 200-300+    Taking Medications:  Diabetes Medication(s)     Biguanides       metFORMIN (GLUCOPHAGE) 1000 MG tablet    TAKE 1 TABLET(1000 MG) BY MOUTH TWICE DAILY WITH MEALS    Insulin       Insulin Glargine-yfgn 100 UNIT/ML SOPN    Inject 20 Units Subcutaneous At Bedtime          Taking Medication Assessed Today: Yes  Current Treatments: Oral Medication (taken by mouth), Insulin Injections  Dose schedule: At bedtime  Given by: Patient  Problems taking diabetes medications regularly?: Yes  Diabetes medication side effects?: Yes    Problem Solving:  Problem Solving Assessed Today: Yes  Is the patient at risk for hypoglycemia?: Yes  Hypoglycemia Frequency: Rarely (Rarely right now, frequently when working in the summer months)  Medical ID: No  Does patient have glucagon emergency kit?: No  Is the patient at risk for DKA?: No  Does patient have severe weather/disaster plan for diabetes management?: No  Does patient have sick day plan for diabetes management?: No    Hypoglycemia symptoms  Confusion: Yes  Nervousness/Anxiety: Yes  Speech difficulty: Yes    Hypoglycemia Complications  Blackouts: No  Hospitalization: No  Nocturnal hypoglycemia: No  Required assistance: No  Required glucagon injection: No  Seizures: No    Reducing  Risks:  Reducing Risks Assessed Today: Yes  Diabetes Risks: Family History  CAD Risks: Diabetes Mellitus, Male sex  Has dilated eye exam at least once a year?: No (upcoming appt this month)  Sees dentist every 6 months?: No  Feet checked by healthcare provider in the last year?: Yes    Healthy Coping:  Healthy Coping Assessed Today: Yes  Emotional response to diabetes: Concern for health and well-being, Depression, Guilt/Self-blame, Shock/Denial/Bargaining, Fear/Anxiety  Informal Support system:: Other  Stage of change: PREPARATION (Decided to change - considering how)  Support resources: Other (Patient interested in returning to his Syn[employer] community for support)  Patient Activation Measure Survey Score:  No flowsheet data found.      Care Plan and Education Provided:  Care Plan: Diabetes   Updates made by Michelle Browne RD since 2/1/2023 12:00 AM      Problem: HbA1C Not In Goal       Goal: Establish Regular Follow-Ups with PCP       Task: Discuss with PCP the recommended timing for patient's next follow up visit(s)    Responsible User: Michelle Browne RD      Task: Discuss schedule for PCP visits with patient    Responsible User: Michelle Browne RD      Goal: Get HbA1C Level in Goal       Task: Educate patient on diabetes education self-management topics    Responsible User: Michelle Browne RD      Task: Educate patient on benefits of regular glucose monitoring    Responsible User: Michelle Browne RD      Task: Refer patient to appropriate extended care team member, as needed (Medication Therapy Management, Behavioral Health, Physical Therapy, etc.)    Responsible User: Michelle Browne RD      Task: Discuss diabetes treatment plan with patient    Responsible User: Michelle Browne RD      Problem: Diabetes Self-Management Education Needed to Optimize Self-Care Behaviors       Goal: Understand diabetes pathophysiology and disease progression       Task: Provide education on diabetes pathophysiology and disease progression  specfic to patient's diabetes type    Responsible User: Michelle Browne RD      Goal: Healthy Eating - follow a healthy eating pattern for diabetes       Task: Provide education on portion control and consistency in amount, composition and timing of food intake Completed 2/1/2023   Responsible User: Michelle Browne RD      Task: Provide education on managing carbohydrate intake (carbohydrate counting, plate planning method, etc.)    Responsible User: Michelle Browne RD      Task: Provide education on weight management Completed 2/1/2023   Responsible User: Michelle Browne RD      Task: Provide education on heart healthy eating    Responsible User: Michelle Browne RD      Task: Provide education on eating out    Responsible User: Michelle Browne RD      Task: Develop individualized healthy eating plan with patient    Responsible User: Michelle Browne RD      Goal: Being Active - get regular physical activity, working up to at least 150 minutes per week       Task: Provide education on relationship of activity to glucose and precautions to take if at risk for low glucose Completed 2/1/2023   Responsible User: Michelle Browne RD      Task: Discuss barriers to physical activity with patient    Responsible User: Michelle Browne RD      Task: Develop physical activity plan with patient    Responsible User: Michelle Browne RD      Task: Explore community resources including walking groups, assistance programs, and home videos    Responsible User: Michelle Browne RD      Goal: Monitoring - monitor glucose and ketones as directed       Task: Provide education on blood glucose monitoring (purpose, proper technique, frequency, glucose targets, interpreting results, when to use glucose control solution, sharps disposal) Completed 2/1/2023   Responsible User: Michelle Browne RD      Task: Provide education on continuous glucose monitoring (sensor placement, use of shalini or /reader, understanding glucose trends, alerts and alarms,  differences between sensor glucose and blood glucose) Completed 2/1/2023   Responsible User: Michelle Browne RD      Task: Provide education on ketone monitoring (when to monitor, frequency, etc.)    Responsible User: Michelle Browne RD      Goal: Taking Medication - patient is consistently taking medications as directed    This Visit's Progress: 0%   Note:    I will take my long-acting insulin at the same time every day.      Task: Provide education on action of prescribed medication, including when to take and possible side effects Completed 2/1/2023   Responsible User: Michelle Browne RD      Task: Provide education on insulin and injectable diabetes medications, including administration, storage, site selection and rotation for injection sites Completed 2/1/2023   Responsible User: Michelle Browne RD      Task: Discuss barriers to medication adherence with patient and provide management technique ideas as appropriate Completed 2/1/2023   Responsible User: Michelle Browne RD      Task: Provide education on frequency and refill details of medications    Responsible User: Michelle Browne RD      Goal: Problem Solving - know how to prevent and manage short-term diabetes complications       Task: Provide education on high blood glucose - causes, signs/symptoms, prevention and treatment    Responsible User: Michelle Browne RD      Task: Provide education on low blood glucose - causes, signs/symptoms, prevention, treatment, carrying a carbohydrate source at all times, and medical identification Completed 2/1/2023   Responsible User: Michelle Browne RD      Task: Provide education on safe travel with diabetes    Responsible User: Michelle Browne RD      Task: Provide education on how to care for diabetes on sick days    Responsible User: Michelle Browne RD      Task: Provide education on when to call a health care provider    Responsible User: Michelle Browne RD      Goal: Reducing Risks - know how to prevent and treat long-term  diabetes complications       Task: Provide education on major complications of diabetes, prevention, early diagnostic measures and treatment of complications Completed 2/1/2023   Responsible User: Michelle Browne RD      Task: Provide education on recommended care for dental, eye and foot health    Responsible User: Michelle Browne RD      Task: Provide education on Hemoglobin A1c - goals and relationship to blood glucose levels    Responsible User: Michelle Browne RD      Task: Provide education on recommendations for heart health - lipid levels and goals, blood pressure and goals, and aspirin therapy, if indicated    Responsible User: Michelle Browne RD      Task: Provide education on tobacco cessation    Responsible User: Michelle Browne RD      Goal: Healthy Coping - use available resources to cope with the challenges of managing diabetes       Task: Discuss recognizing feelings about having diabetes Completed 2/1/2023   Responsible User: Michelle Browne RD      Task: Provide education on the benefits of making appropriate lifestyle changes Completed 2/1/2023   Responsible User: Michelle Browne RD      Task: Provide education on benefits of utilizing support systems Completed 2/1/2023   Responsible User: Michelle Browne RD      Task: Discuss methods for coping with stress    Responsible User: Michelle Browne RD      Task: Provide education on when to seek professional counseling    Responsible User: Michelle Browne RD Elise Graham, RD, [facility], [facility]     Time Spent: 50 minutes  Encounter Type: Individual    Any diabetes medication dose changes were made via the CDE Protocol per the patient's referring provider. A copy of this encounter was shared with the provider.

## 2023-02-01 NOTE — PROGRESS NOTES
Diabetes Self-Management Education & Support    Presents for: Individual review    Type of Service: Telephone Visit    Originating Location (Patient Location): Home  Distant Location (Provider Location): [facility] [place]  Mode of Communication:  Telephone    Telephone Visit Start Time: 12:34p  Telephone Visit End Time (telephone visit stop time): 1:24p    How would patient like to obtain AVS? Kevin    Assessment Type:   ASSESSMENT:  Wenceslao has had very high A1C, restarted on insulin just 3 days ago. He has fear of medicines, that he might get addicted to them. 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 He will stop insulin in the summer when he goes back to work. He is only eating meals once a day to lose weight, has cut out meat to help do this. Discussed healthy diet for blood sugar control & weight loss. Discussed possible meds that could help him lose weight without causing low blood sugars. Encouraged him to stay on insulin for now to get blood sugars down and discussed slow titration plan. Encouraged him to test blood sugars BID, fasting & 2 hrs after start of biggest meal of the day. He is agreeable.     Patient's most recent   Lab Results   Component Value Date    A1C 11.5 01/25/2023    A1C 12.1 05/12/2021     is not meeting goal of <7.0    Diabetes knowledge and skills assessment:   Patient is knowledgeable in diabetes management concepts related to: none    Continue education with the following diabetes management concepts: Healthy Eating, Being Active, Monitoring, Taking Medication, Problem Solving, Reducing Risks and Healthy Coping    Based on learning assessment above, most appropriate setting for further diabetes education would be: Group class or Individual setting.      PLAN    Check blood sugars twice daily, fasting & 2 hrs after start of biggest meal of the day  Continue Lantus 20 units daily, increase  "by 2 units every 3-4 days until blood sugars are around 150 in AM  Recommend use of Ozempic 0.5 mg/week, starting with 0.25 mg x first 4 weeks to help support weight loss & blood sugar control - will reach out to PCP for sign off and follow up with patient     Follow-up: 3/8/23    See Care Plan for co-developed, patient-state behavior change goals.  AVS provided for patient today.    Education Materials Provided:  [facility] Understanding Diabetes Booklet and BG Log Sheet      SUBJECTIVE/OBJECTIVE:  Presents for: Individual review  Accompanied by: Self, Other  Diabetes education in the past 24mo: No  Diabetes type: Type 2  Date of diagnosis: 6+ years  Disease course: Getting harder to manage  How confident are you filling out medical forms by yourself:: Not Assessed  Diabetes management related comments/concerns: A1C is up very high, BGs up quite high too, needing help to get them down  Transportation concerns: No  Difficulty affording diabetes medication?: Sometimes  Difficulty affording diabetes testing supplies?: Sometimes  Other concerns:: Glasses  Cultural Influences/Ethnic Background:  Choose not to answer      Diabetes Symptoms & Complications:  Fatigue: Yes  Polydipsia: Yes  Polyuria: Yes  Visual change: No  Symptom course: Worsening  Weight trend: Decreasing (Possibly intentionally)  Complications assessed today?: No    Patient Problem List and Family Medical History reviewed for relevant medical history, current medical status, and diabetes risk factors.    Vitals:  There were no vitals taken for this visit.  Estimated body mass index is 32.75 kg/m  as calculated from the following:    Height as of 1/25/23: 1.63 m (5' 4.17\").    Weight as of 1/25/23: 87 kg (191 lb 12.8 oz).   Last 3 BP:   BP Readings from Last 3 Encounters:   01/25/23 134/88   08/02/22 (!) 148/94   09/07/21 132/89       History   Smoking Status     Former     Years: 3.00     Types: Cigarettes     Start date: 6/15/2005     Quit date: " 7/20/2011   Smokeless Tobacco     Former     Quit date: 12/22/2011     Comment: smoke free household.       Labs:  Lab Results   Component Value Date    A1C 11.5 01/25/2023    A1C 12.1 05/12/2021     Lab Results   Component Value Date     08/02/2022     06/30/2021     Lab Results   Component Value Date     01/25/2023     08/02/2022     01/11/2021     HDL Cholesterol   Date Value Ref Range Status   01/11/2021 36 (L) >39 mg/dL Final     Direct Measure HDL   Date Value Ref Range Status   01/25/2023 41 >=40 mg/dL Final   ]  GFR Estimate   Date Value Ref Range Status   08/02/2022 >90 >60 mL/min/1.73m2 Final     Comment:     Effective December 21, 2021 eGFRcr in adults is calculated using the 2021 CKD-EPI creatinine equation which includes age and gender (Michaelle moe al., NEJ, DOI: 10.1056/FLZKlm2314803)   06/30/2021 86 >60 mL/min/[1.73_m2] Final     Comment:     Non  GFR Calc  Starting 12/18/2018, serum creatinine based estimated GFR (eGFR) will be   calculated using the Chronic Kidney Disease Epidemiology Collaboration   (CKD-EPI) equation.       GFR Estimate If Black   Date Value Ref Range Status   06/30/2021 >90 >60 mL/min/[1.73_m2] Final     Comment:      GFR Calc  Starting 12/18/2018, serum creatinine based estimated GFR (eGFR) will be   calculated using the Chronic Kidney Disease Epidemiology Collaboration   (CKD-EPI) equation.       Lab Results   Component Value Date    CR 0.84 08/02/2022    CR 1.07 06/30/2021     No results found for: MICROALBUMIN    Healthy Eating:  Healthy Eating Assessed Today: Yes  Cultural/Adventist diet restrictions?: Yes  Meal planning/habits: Smaller portions  Meals include: Dinner  Breakfast: skips  Lunch: skips  Dinner: 5p - Impossible whopper  Snacks: sometimes chips  Other: eats only 1 meal/day, has cut out meat to support weight loss  Beverages: Water  Has patient met with a dietitian in the past?: Yes    Being  Active:  Being Active Assessed Today: Yes  Exercise:: Currently not exercising (works in construction, fairly active in the summer)  Barrier to exercise: None    Monitoring:  Monitoring Assessed Today: Yes  Did patient bring glucose meter to appointment? : No  Blood Glucose Meter: Unknown  Times checking blood sugar at home (number): 1  Times checking blood sugar at home (per): Day  Blood glucose trend: Fluctuating    Blood sugars - 200-300+    Taking Medications:  Diabetes Medication(s)     Biguanides       metFORMIN (GLUCOPHAGE) 1000 MG tablet    TAKE 1 TABLET(1000 MG) BY MOUTH TWICE DAILY WITH MEALS    Insulin       Insulin Glargine-yfgn 100 UNIT/ML SOPN    Inject 20 Units Subcutaneous At Bedtime          Taking Medication Assessed Today: Yes  Current Treatments: Oral Medication (taken by mouth), Insulin Injections  Dose schedule: At bedtime  Given by: Patient  Problems taking diabetes medications regularly?: Yes  Diabetes medication side effects?: Yes    Problem Solving:  Problem Solving Assessed Today: Yes  Is the patient at risk for hypoglycemia?: Yes  Hypoglycemia Frequency: Rarely (Rarely right now, frequently when working in the summer months)  Medical ID: No  Does patient have glucagon emergency kit?: No  Is the patient at risk for DKA?: No  Does patient have severe weather/disaster plan for diabetes management?: No  Does patient have sick day plan for diabetes management?: No    Hypoglycemia symptoms  Confusion: Yes  Nervousness/Anxiety: Yes  Speech difficulty: Yes    Hypoglycemia Complications  Blackouts: No  Hospitalization: No  Nocturnal hypoglycemia: No  Required assistance: No  Required glucagon injection: No  Seizures: No    Reducing Risks:  Reducing Risks Assessed Today: Yes  Diabetes Risks: Family History  CAD Risks: Diabetes Mellitus, Male sex  Has dilated eye exam at least once a year?: No (upcoming appt this month)  Sees dentist every 6 months?: No  Feet checked by healthcare provider in the  last year?: Yes    Healthy Coping:  Healthy Coping Assessed Today: Yes  Emotional response to diabetes: Concern for health and well-being, Depression, Guilt/Self-blame, Shock/Denial/Bargaining, Fear/Anxiety  Informal Support system:: Other  Stage of change: PREPARATION (Decided to change - considering how)  Support resources: Other (Patient interested in returning to his [employer] community for support)  Patient Activation Measure Survey Score:  No flowsheet data found.      Care Plan and Education Provided:  Care Plan: Diabetes   Updates made by Michelle Browne RD since 2/1/2023 12:00 AM      Problem: HbA1C Not In Goal       Goal: Establish Regular Follow-Ups with PCP       Task: Discuss with PCP the recommended timing for patient's next follow up visit(s)    Responsible User: Michelle Browne RD      Task: Discuss schedule for PCP visits with patient    Responsible User: Michelle Browne RD      Goal: Get HbA1C Level in Goal       Task: Educate patient on diabetes education self-management topics    Responsible User: Michelle Browne RD      Task: Educate patient on benefits of regular glucose monitoring    Responsible User: Michelle Browne RD      Task: Refer patient to appropriate extended care team member, as needed (Medication Therapy Management, Behavioral Health, Physical Therapy, etc.)    Responsible User: Michelle Browne RD      Task: Discuss diabetes treatment plan with patient    Responsible User: Michelle Browne RD      Problem: Diabetes Self-Management Education Needed to Optimize Self-Care Behaviors       Goal: Understand diabetes pathophysiology and disease progression       Task: Provide education on diabetes pathophysiology and disease progression specfic to patient's diabetes type    Responsible User: Michelle Browne RD      Goal: Healthy Eating - follow a healthy eating pattern for diabetes       Task: Provide education on portion control and consistency in amount, composition and timing of food intake  Completed 2/1/2023   Responsible User: Michelle Browne RD      Task: Provide education on managing carbohydrate intake (carbohydrate counting, plate planning method, etc.)    Responsible User: Michelle Browne RD      Task: Provide education on weight management Completed 2/1/2023   Responsible User: Michelle Browne RD      Task: Provide education on heart healthy eating    Responsible User: Michelle Browne RD      Task: Provide education on eating out    Responsible User: Michelle Browne RD      Task: Develop individualized healthy eating plan with patient    Responsible User: Michelle Browne RD      Goal: Being Active - get regular physical activity, working up to at least 150 minutes per week       Task: Provide education on relationship of activity to glucose and precautions to take if at risk for low glucose Completed 2/1/2023   Responsible User: Michelle Browne RD      Task: Discuss barriers to physical activity with patient    Responsible User: Michelle Browne RD      Task: Develop physical activity plan with patient    Responsible User: Michelle Browne RD      Task: Explore community resources including walking groups, assistance programs, and home videos    Responsible User: Michelle Browne RD      Goal: Monitoring - monitor glucose and ketones as directed       Task: Provide education on blood glucose monitoring (purpose, proper technique, frequency, glucose targets, interpreting results, when to use glucose control solution, sharps disposal) Completed 2/1/2023   Responsible User: Michelle Browne RD      Task: Provide education on continuous glucose monitoring (sensor placement, use of shalini or /reader, understanding glucose trends, alerts and alarms, differences between sensor glucose and blood glucose) Completed 2/1/2023   Responsible User: Michelle Browne RD      Task: Provide education on ketone monitoring (when to monitor, frequency, etc.)    Responsible User: Michelle Browne RD      Goal: Taking Medication -  patient is consistently taking medications as directed    This Visit's Progress: 0%   Note:    I will take my long-acting insulin at the same time every day.      Task: Provide education on action of prescribed medication, including when to take and possible side effects Completed 2/1/2023   Responsible User: Michelle Browne RD      Task: Provide education on insulin and injectable diabetes medications, including administration, storage, site selection and rotation for injection sites Completed 2/1/2023   Responsible User: Michelle Browne RD      Task: Discuss barriers to medication adherence with patient and provide management technique ideas as appropriate Completed 2/1/2023   Responsible User: Michelle Browne RD      Task: Provide education on frequency and refill details of medications    Responsible User: Michelle Browne RD      Goal: Problem Solving - know how to prevent and manage short-term diabetes complications       Task: Provide education on high blood glucose - causes, signs/symptoms, prevention and treatment    Responsible User: Michelle Browne RD      Task: Provide education on low blood glucose - causes, signs/symptoms, prevention, treatment, carrying a carbohydrate source at all times, and medical identification Completed 2/1/2023   Responsible User: Michelle Browne RD      Task: Provide education on safe travel with diabetes    Responsible User: Michelle Browne RD      Task: Provide education on how to care for diabetes on sick days    Responsible User: Michelle Browne RD      Task: Provide education on when to call a health care provider    Responsible User: Michelle Browne RD      Goal: Reducing Risks - know how to prevent and treat long-term diabetes complications       Task: Provide education on major complications of diabetes, prevention, early diagnostic measures and treatment of complications Completed 2/1/2023   Responsible User: Michelle Browne RD      Task: Provide education on recommended care for  dental, eye and foot health    Responsible User: Michelle Browne RD      Task: Provide education on Hemoglobin A1c - goals and relationship to blood glucose levels    Responsible User: Michelle Browne RD      Task: Provide education on recommendations for heart health - lipid levels and goals, blood pressure and goals, and aspirin therapy, if indicated    Responsible User: Michelle Browne RD      Task: Provide education on tobacco cessation    Responsible User: Michelle Browne RD      Goal: Healthy Coping - use available resources to cope with the challenges of managing diabetes       Task: Discuss recognizing feelings about having diabetes Completed 2/1/2023   Responsible User: Michelle Browne RD      Task: Provide education on the benefits of making appropriate lifestyle changes Completed 2/1/2023   Responsible User: Michelle Browne RD      Task: Provide education on benefits of utilizing support systems Completed 2/1/2023   Responsible User: Michelle Browne RD      Task: Discuss methods for coping with stress    Responsible User: Michelle Browne RD      Task: Provide education on when to seek professional counseling    Responsible User: Michelle Browne RD Elise Graham, RD, MARGRET, [facility]     Time Spent: 50 minutes  Encounter Type: Individual    Any diabetes medication dose changes were made via the CDE Protocol per the patient's referring provider. A copy of this encounter was shared with the provider.

## 2023-02-13 DIAGNOSIS — E11.9 TYPE 2 DIABETES MELLITUS WITHOUT COMPLICATION, WITHOUT LONG-TERM CURRENT USE OF INSULIN (H): Primary | ICD-10-CM

## 2023-02-13 RX ORDER — SEMAGLUTIDE 1.34 MG/ML
0.5 INJECTION, SOLUTION SUBCUTANEOUS
Qty: 1.5 ML | Refills: 1 | Status: SHIPPED | OUTPATIENT
Start: 2023-02-13 | End: 2023-04-05

## 2023-02-13 NOTE — TELEPHONE ENCOUNTER
Dr. Sheldon,   I saw Wenceslao a few weeks ago and he was interested in using a medication that could help his blood sugar control as well as support some weight loss. I think he'd benefit from the use of Ozempic 0.5 mg/week, starting with 0.25 mg/week x first 4 weeks. I've pended orders. Please sign off if you agree or provide an alternative plan.   Thank you,   Michelle Browne, RD, LD, [facility]

## 2023-02-14 NOTE — TELEPHONE ENCOUNTER
Patient informed via [employer].     Michelle Browne, RD, LD, [facility]

## 2023-02-22 ENCOUNTER — TELEPHONE (OUTPATIENT)
Dept: FAMILY MEDICINE | Facility: CLINIC | Age: 43
End: 2023-02-22
Payer: COMMERCIAL

## 2023-02-22 NOTE — TELEPHONE ENCOUNTER
Prior Authorization Retail Medication Request    Medication/Dose: semaglutide (OZEMPIC, 0.25 OR 0.5 MG/DOSE,) 2 MG/1.5ML SOPN pen  ICD code (if different than what is on RX):    Previously Tried and Failed:    Rationale:      Insurance Name:    Insurance ID:        Pharmacy Information (if different than what is on RX)  Name:    Phone:

## 2023-02-27 NOTE — TELEPHONE ENCOUNTER
PA Initiation    Medication: semaglutide (OZEMPIC, 0.25 OR 0.5 MG/DOSE,) 2 MG/1.5ML SOPN pen  Insurance Company: [employer] - Phone [phone] Fax [phone]  Pharmacy Filling the Rx: [id number] - [place] [address] AT [facility] [address] [facility]  Filling Pharmacy Phone: [phone]  Filling Pharmacy Fax: [phone]  Start Date: 2/27/2023

## 2023-02-28 NOTE — TELEPHONE ENCOUNTER
I would say either Bydureon or Victoza - the BCise pen is not quite as easy to use (needs mixing) but the benefit would be that its once weekly, rather than daily. I have follow up with Wenceslao next week and can help him with this as needed.     Michelle Browne, RD, LD, [facility]

## 2023-02-28 NOTE — TELEPHONE ENCOUNTER
PRIOR AUTHORIZATION DENIED    Medication: semaglutide (OZEMPIC, 0.25 OR 0.5 MG/DOSE,) 2 MG/1.5ML SOPN pen    Denial Date: 2/28/2023    Denial Rationale: Patient has to first try/fail 2 preferred medications- Bydureon BCise, Byetta, Victoza, Janumet, Januvia, Jentadueto, Kombiglyze, Onglyza, Tradjenta.          Appeal Information: If provider would like to appeal this decision please attach a detailed letter of medical necessity to the patient's chart and route the encounter back to the PA Team.

## 2023-04-04 ENCOUNTER — TELEPHONE (OUTPATIENT)
Dept: FAMILY MEDICINE | Facility: CLINIC | Age: 43
End: 2023-04-04
Payer: COMMERCIAL

## 2023-04-04 DIAGNOSIS — E11.9 TYPE 2 DIABETES MELLITUS WITHOUT COMPLICATION, WITHOUT LONG-TERM CURRENT USE OF INSULIN (H): ICD-10-CM

## 2023-04-04 NOTE — TELEPHONE ENCOUNTER
Prior Authorization Retail Medication Request    Medication/Dose: Insulin Glargine-yfgn 100 UNIT/ML SOPN  ICD code (if different than what is on RX): E11.65, Z79.4, E11.9      Insurance Name:  [employer] [place]   Insurance ID:  [id number]      Pharmacy Information (if different than what is on RX)  Name:  [facility]  Phone: [phone]

## 2023-04-04 NOTE — TELEPHONE ENCOUNTER
Prior Authorization Retail Medication Request    Medication/Dose: semaglutide (OZEMPIC, 0.25 OR 0.5 MG/DOSE,) 2 MG/1.5ML SOPN pen  ICD code (if different than what is on RX):      Insurance Name:  [employer] Plus Advantage   Insurance ID:  [id number]       Pharmacy Information (if different than what is on RX)  Received paper refill request for formulary exception

## 2023-04-05 RX ORDER — SEMAGLUTIDE 1.34 MG/ML
0.5 INJECTION, SOLUTION SUBCUTANEOUS
Qty: 1.5 ML | Refills: 1 | Status: SHIPPED | OUTPATIENT
Start: 2023-04-05 | End: 2024-02-13

## 2023-04-08 NOTE — TELEPHONE ENCOUNTER
Central Prior Authorization Team   Phone: [phone]    PA Initiation    Medication: Insulin Glarg-YFGN  Insurance Company:    Pharmacy Filling the Rx: [facility][id number] [place] [address] [place] [facility] [place] [address] [facility]  Filling Pharmacy Phone: [phone]  Filling Pharmacy Fax: [phone]  Start Date: 4/8/2023

## 2023-04-11 NOTE — TELEPHONE ENCOUNTER
Prior Authorization Approval    Authorization Effective Date: 1/8/2023  Authorization Expiration Date: 4/8/2024  Medication: Insulin Glarg-YFGN - APPROVED  Approved Dose/Quantity: 45ml per 30 days    Insurance Company: [employer] [place] - Phone [phone] Fax [phone]  Which Pharmacy is filling the prescription (Not needed for infusion/clinic administered): [id number] - [facility] [address] AT [facility] OF [address] [facility]  Pharmacy Notified: Yes  Patient Notified: Yes Pharmacy will notify patient once order is ready.

## 2023-04-22 DIAGNOSIS — E11.9 TYPE 2 DIABETES MELLITUS WITHOUT COMPLICATION, WITHOUT LONG-TERM CURRENT USE OF INSULIN (H): ICD-10-CM

## 2023-04-24 RX ORDER — BLOOD SUGAR DIAGNOSTIC
STRIP MISCELLANEOUS
Qty: 100 STRIP | Refills: 1 | Status: SHIPPED | OUTPATIENT
Start: 2023-04-24 | End: 2024-06-17

## 2023-04-29 ENCOUNTER — HEALTH MAINTENANCE LETTER (OUTPATIENT)
Age: 43
End: 2023-04-29

## 2023-05-01 ENCOUNTER — TELEPHONE (OUTPATIENT)
Dept: FAMILY MEDICINE | Facility: CLINIC | Age: 43
End: 2023-05-01
Payer: COMMERCIAL

## 2023-05-01 NOTE — TELEPHONE ENCOUNTER
Patient Quality Outreach    Patient is due for the following:   Diabetes -  A1C and Eye Exam  Physical Preventive Adult Physical    Next Steps:   Schedule a Annual Wellness Visit    Type of outreach:    Sent [employer] message.      Questions for provider review:    None     CHRISTINA KENNEDY, CMA

## 2023-06-03 ENCOUNTER — HEALTH MAINTENANCE LETTER (OUTPATIENT)
Age: 43
End: 2023-06-03

## 2023-06-23 DIAGNOSIS — E11.9 TYPE 2 DIABETES MELLITUS WITHOUT COMPLICATION, WITHOUT LONG-TERM CURRENT USE OF INSULIN (H): ICD-10-CM

## 2023-06-23 DIAGNOSIS — Z79.4 TYPE 2 DIABETES MELLITUS WITH HYPERGLYCEMIA, WITH LONG-TERM CURRENT USE OF INSULIN (H): ICD-10-CM

## 2023-06-23 DIAGNOSIS — E11.65 TYPE 2 DIABETES MELLITUS WITH HYPERGLYCEMIA, WITH LONG-TERM CURRENT USE OF INSULIN (H): ICD-10-CM

## 2023-06-23 RX ORDER — INSULIN GLARGINE-YFGN 100 [IU]/ML
INJECTION, SOLUTION SUBCUTANEOUS
Qty: 15 ML | Refills: 0 | Status: SHIPPED | OUTPATIENT
Start: 2023-06-23 | End: 2024-06-17

## 2023-06-23 NOTE — LETTER
June 30, 2023      Wenceslao Hutson  [address] [address]        Dear Wenceslao,     Your provider has sent a tomás refill of Insulin Glargine-yfgn 100 UNIT/ML SOPN. You are due for an appointment for further refills.  Please contact the clinic or use [employer] to schedule an appointment for further refills.     Sincerely,     IWONA Sewell/TONJA

## 2023-06-29 NOTE — TELEPHONE ENCOUNTER
Called and left message for patient to return call to clinic.     -if patient return call to clinic please inform patient of message for provider and assists patient scheduling an Diabetic Check with PCP.     Tricia Lane, CMA

## 2023-09-30 ENCOUNTER — HEALTH MAINTENANCE LETTER (OUTPATIENT)
Age: 43
End: 2023-09-30

## 2023-11-13 ENCOUNTER — TELEPHONE (OUTPATIENT)
Dept: FAMILY MEDICINE | Facility: CLINIC | Age: 43
End: 2023-11-13
Payer: COMMERCIAL

## 2024-01-23 DIAGNOSIS — I10 HTN, GOAL BELOW 140/90: ICD-10-CM

## 2024-01-23 RX ORDER — AMLODIPINE BESYLATE 5 MG/1
5 TABLET ORAL DAILY
Qty: 90 TABLET | Refills: 0 | Status: SHIPPED | OUTPATIENT
Start: 2024-01-23 | End: 2024-02-13

## 2024-01-24 NOTE — TELEPHONE ENCOUNTER
Next 5 appointments (look out 90 days)      Jan 30, 2024  4:00 PM  (Arrive by 3:40 PM)  Provider Visit with Casey Sheldon MD  [facility] ([facility] ) [address]  [place] [address]  [phone]         Anne Junior,

## 2024-02-13 ENCOUNTER — OFFICE VISIT (OUTPATIENT)
Dept: FAMILY MEDICINE | Facility: CLINIC | Age: 44
End: 2024-02-13
Payer: COMMERCIAL

## 2024-02-13 VITALS
DIASTOLIC BLOOD PRESSURE: 86 MMHG | RESPIRATION RATE: 18 BRPM | HEIGHT: 65 IN | HEART RATE: 87 BPM | BODY MASS INDEX: 32.89 KG/M2 | SYSTOLIC BLOOD PRESSURE: 123 MMHG | OXYGEN SATURATION: 100 % | WEIGHT: 197.4 LBS

## 2024-02-13 DIAGNOSIS — E78.5 HYPERLIPIDEMIA ASSOCIATED WITH TYPE 2 DIABETES MELLITUS (H): ICD-10-CM

## 2024-02-13 DIAGNOSIS — M22.2X2 PATELLOFEMORAL ARTHRALGIA OF BOTH KNEES: ICD-10-CM

## 2024-02-13 DIAGNOSIS — E11.69 TYPE 2 DIABETES MELLITUS WITH OTHER SPECIFIED COMPLICATION, WITHOUT LONG-TERM CURRENT USE OF INSULIN (H): Primary | ICD-10-CM

## 2024-02-13 DIAGNOSIS — F43.21 ADJUSTMENT DISORDER WITH DEPRESSED MOOD: ICD-10-CM

## 2024-02-13 DIAGNOSIS — M25.562 ACUTE PAIN OF BOTH KNEES: ICD-10-CM

## 2024-02-13 DIAGNOSIS — R06.83 LOUD SNORING: ICD-10-CM

## 2024-02-13 DIAGNOSIS — I10 ESSENTIAL HYPERTENSION: ICD-10-CM

## 2024-02-13 DIAGNOSIS — M25.561 ACUTE PAIN OF BOTH KNEES: ICD-10-CM

## 2024-02-13 DIAGNOSIS — E11.69 HYPERLIPIDEMIA ASSOCIATED WITH TYPE 2 DIABETES MELLITUS (H): ICD-10-CM

## 2024-02-13 DIAGNOSIS — M22.2X1 PATELLOFEMORAL ARTHRALGIA OF BOTH KNEES: ICD-10-CM

## 2024-02-13 LAB
ALBUMIN SERPL BCG-MCNC: 4.4 G/DL (ref 3.5–5.2)
ALP SERPL-CCNC: 64 U/L (ref 40–150)
ALT SERPL W P-5'-P-CCNC: 46 U/L (ref 0–70)
ANION GAP SERPL CALCULATED.3IONS-SCNC: 13 MMOL/L (ref 7–15)
AST SERPL W P-5'-P-CCNC: 27 U/L (ref 0–45)
BILIRUB SERPL-MCNC: 0.3 MG/DL
BUN SERPL-MCNC: 8.1 MG/DL (ref 6–20)
CALCIUM SERPL-MCNC: 9 MG/DL (ref 8.6–10)
CHLORIDE SERPL-SCNC: 104 MMOL/L (ref 98–107)
CHOLEST SERPL-MCNC: 157 MG/DL
CREAT SERPL-MCNC: 0.79 MG/DL (ref 0.67–1.17)
DEPRECATED HCO3 PLAS-SCNC: 22 MMOL/L (ref 22–29)
EGFRCR SERPLBLD CKD-EPI 2021: >90 ML/MIN/1.73M2
FASTING STATUS PATIENT QL REPORTED: ABNORMAL
GLUCOSE SERPL-MCNC: 253 MG/DL (ref 70–99)
HBA1C MFR BLD: 7.1 % (ref 0–5.6)
HDLC SERPL-MCNC: 37 MG/DL
LDLC SERPL CALC-MCNC: 76 MG/DL
NONHDLC SERPL-MCNC: 120 MG/DL
POTASSIUM SERPL-SCNC: 3.7 MMOL/L (ref 3.4–5.3)
PROT SERPL-MCNC: 6.7 G/DL (ref 6.4–8.3)
SODIUM SERPL-SCNC: 139 MMOL/L (ref 135–145)
TRIGL SERPL-MCNC: 220 MG/DL

## 2024-02-13 PROCEDURE — 82570 ASSAY OF URINE CREATININE: CPT | Performed by: FAMILY MEDICINE

## 2024-02-13 PROCEDURE — 80061 LIPID PANEL: CPT | Performed by: FAMILY MEDICINE

## 2024-02-13 PROCEDURE — 82043 UR ALBUMIN QUANTITATIVE: CPT | Performed by: FAMILY MEDICINE

## 2024-02-13 PROCEDURE — 99214 OFFICE O/P EST MOD 30 MIN: CPT | Performed by: FAMILY MEDICINE

## 2024-02-13 PROCEDURE — 99207 PR FOOT EXAM NO CHARGE: CPT | Mod: 25 | Performed by: FAMILY MEDICINE

## 2024-02-13 PROCEDURE — 36415 COLL VENOUS BLD VENIPUNCTURE: CPT | Performed by: FAMILY MEDICINE

## 2024-02-13 PROCEDURE — 80053 COMPREHEN METABOLIC PANEL: CPT | Performed by: FAMILY MEDICINE

## 2024-02-13 PROCEDURE — 83036 HEMOGLOBIN GLYCOSYLATED A1C: CPT | Performed by: FAMILY MEDICINE

## 2024-02-13 RX ORDER — MELOXICAM 15 MG/1
15 TABLET ORAL DAILY
Qty: 30 TABLET | Refills: 2 | Status: SHIPPED | OUTPATIENT
Start: 2024-02-13 | End: 2024-06-17

## 2024-02-13 RX ORDER — ROSUVASTATIN CALCIUM 10 MG/1
10 TABLET, COATED ORAL DAILY
Qty: 90 TABLET | Refills: 3 | Status: SHIPPED | OUTPATIENT
Start: 2024-02-13

## 2024-02-13 RX ORDER — AMLODIPINE BESYLATE 5 MG/1
5 TABLET ORAL DAILY
Qty: 90 TABLET | Refills: 3 | Status: SHIPPED | OUTPATIENT
Start: 2024-02-13

## 2024-02-13 ASSESSMENT — PAIN SCALES - GENERAL: PAINLEVEL: NO PAIN (0)

## 2024-02-13 ASSESSMENT — PATIENT HEALTH QUESTIONNAIRE - PHQ9
SUM OF ALL RESPONSES TO PHQ QUESTIONS 1-9: 13
SUM OF ALL RESPONSES TO PHQ QUESTIONS 1-9: 13
10. IF YOU CHECKED OFF ANY PROBLEMS, HOW DIFFICULT HAVE THESE PROBLEMS MADE IT FOR YOU TO DO YOUR WORK, TAKE CARE OF THINGS AT HOME, OR GET ALONG WITH OTHER PEOPLE: SOMEWHAT DIFFICULT

## 2024-02-13 NOTE — PROGRESS NOTES
"  Assessment & Plan     Type 2 diabetes mellitus with other specified complication, without long-term current use of insulin (H)    A1c well controlled on Metformin, due for labs and med refill. Discussed role of healthy lifestyle in chronic disease management and commended him for his efforts and encouraged him to keep it up  - Adult Eye  Referral  - HEMOGLOBIN A1C  - Albumin Random Urine Quantitative with Creat Ratio  - Lipid panel reflex to direct LDL Non-fasting  - FOOT EXAM  - Comprehensive metabolic panel (BMP + Alb, Alk Phos, ALT, AST, Total. Bili, TP)  - HEMOGLOBIN A1C  - Albumin Random Urine Quantitative with Creat Ratio  - Lipid panel reflex to direct LDL Non-fasting  - Comprehensive metabolic panel (BMP + Alb, Alk Phos, ALT, AST, Total. Bili, TP)  - metFORMIN (GLUCOPHAGE) 1000 MG tablet  Dispense: 180 tablet; Refill: 3    Hyperlipidemia associated with type 2 diabetes mellitus (H)  - Lipid panel reflex to direct LDL Non-fasting  - Lipid panel reflex to direct LDL Non-fasting  - rosuvastatin (CRESTOR) 10 MG tablet  Dispense: 90 tablet; Refill: 3    Essential hypertension   BP well controlled  - Comprehensive metabolic panel (BMP + Alb, Alk Phos, ALT, AST, Total. Bili, TP)  - Albumin Random Urine Quantitative with Creat Ratio  - amLODIPine (NORVASC) 5 MG tablet  Dispense: 90 tablet; Refill: 3    Adjustment disorder with depressed mood    - Reviewed PHQ9 with patient; thinks this is situational and believes will get out of it soon. No plans to hurt hmself    Loud snoring   Discussed evaluation with a sleep study  - Adult Sleep Eval & Management  Referral    Patellofemoral arthralgia of both knees   Will try an NSAID  - meloxicam (MOBIC) 15 MG tablet  Dispense: 30 tablet; Refill: 2    Acute pain of both knees   -  consistent with patellofemoral disorder      BMI  Estimated body mass index is 33.29 kg/m  as calculated from the following:    Height as of this encounter: 1.64 m (5' 4.57\").    " Weight as of this encounter: 89.5 kg (197 lb 6.4 oz).   Weight management plan: Discussed healthy diet and exercise guidelines    Depression Screening Follow Up        2/13/2024     1:06 PM   PHQ   PHQ-9 Total Score 13   Q9: Thoughts of better off dead/self-harm past 2 weeks Several days   F/U: Thoughts of suicide or self-harm No   F/U: Safety concerns No         2/13/2024     1:06 PM   Last PHQ-9   1.  Little interest or pleasure in doing things 2   2.  Feeling down, depressed, or hopeless 2   3.  Trouble falling or staying asleep, or sleeping too much 1   4.  Feeling tired or having little energy 2   5.  Poor appetite or overeating 1   6.  Feeling bad about yourself 2   7.  Trouble concentrating 2   8.  Moving slowly or restless 0   Q9: Thoughts of better off dead/self-harm past 2 weeks 1   PHQ-9 Total Score 13   In the past two weeks have you had thoughts of suicide or self harm? No   Do you have concerns about your personal safety or the safety of others? No       Follow Up      Follow Up Actions Taken  Crisis resource information provided in the After Visit Summary  Patient declined referral.    Discussed the following ways the patient can remain in a safe environment:  be around others    See Patient Instructions    Cyndee Billy is a 43 year old, presenting for the following health issues:  Diabetes (Follow Up Knees )    DM 2:    Taking Metformin,     Ozempic: was not covered, so did not take    Stopped Insulin as well and concentrated on healthy lifestyle.    Snoring:    Wife has said he snores a lot and has apnea episode. Also wakes up in the morning exhausted.    Mother has VALERY and uses a CPAP.    Weight:     Wt Readings from Last 4 Encounters:   02/13/24 89.5 kg (197 lb 6.4 oz)   01/25/23 87 kg (191 lb 12.8 oz)   08/02/22 95.1 kg (209 lb 9.6 oz)   09/07/21 89.5 kg (197 lb 6.4 oz)      HTN:    Amlodipine 5 mg daily    Stress/adjustment disorder  Occurs when cannot pay bills; has not had contracts  "  Makes him feel worthless sometimes and that iis when he feels like not being aroiund but has no plans of harming himself          2/13/2024     1:15 PM   Additional Questions   Roomed by ROXANE STRICKLAND   Accompanied by self     History of Present Illness       Diabetes:   He presents for follow up of diabetes.  He is checking home blood glucose a few times a month.   He checks blood glucose before and after meals.  Blood glucose is never over 200 and never under 70. He is aware of hypoglycemia symptoms including shakiness, dizziness, weakness, lethargy, blurred vision and confusion.   He is concerned about other.   He is having blurry vision.  The patient has not had a diabetic eye exam in the last 12 months.          Hyperlipidemia:  He presents for follow up of hyperlipidemia.   He is taking medication to lower cholesterol. He is not having myalgia or other side effects to statin medications.    Hypertension: He presents for follow up of hypertension.  He does check blood pressure  regularly outside of the clinic. Outside blood pressures have been over 140/90. He does not follow a low salt diet.     He eats 2-3 servings of fruits and vegetables daily.He consumes 0 sweetened beverage(s) daily.He exercises with enough effort to increase his heart rate 9 or less minutes per day.  He exercises with enough effort to increase his heart rate 3 or less days per week. He is missing 2 dose(s) of medications per week.           2/13/2024     1:06 PM   PHQ   PHQ-9 Total Score 13   Q9: Thoughts of better off dead/self-harm past 2 weeks Several days   F/U: Thoughts of suicide or self-harm No   F/U: Safety concerns No         Review of Systems  Constitutional, HEENT, cardiovascular, pulmonary, gi and gu systems are negative, except as otherwise noted.      Objective    /86 (BP Location: Right arm, Cuff Size: Adult Large)   Pulse 87   Resp 18   Ht 1.64 m (5' 4.57\")   Wt 89.5 kg (197 lb 6.4 oz)   SpO2 100%   BMI 33.29 kg/m  "   Body mass index is 33.29 kg/m .  Physical Exam   GENERAL: alert and no distress  NECK: no adenopathy, no asymmetry, masses, or scars  RESP: lungs clear to auscultation - no rales, rhonchi or wheezes  CV: regular rate and rhythm, no murmur, click or rub, no peripheral edema  MS: no gross musculoskeletal defects noted, no edema  NEURO: Normal strength and tone, mentation intact and speech normal  PSYCH: mentation appears normal, affect flat          Signed Electronically by: Casey Sheldon MD

## 2024-02-14 LAB
CREAT UR-MCNC: 738 MG/DL
MICROALBUMIN UR-MCNC: 47.2 MG/L
MICROALBUMIN/CREAT UR: 6.4 MG/G CR (ref 0–17)

## 2024-02-27 ENCOUNTER — APPOINTMENT (OUTPATIENT)
Dept: OPTOMETRY | Facility: CLINIC | Age: 44
End: 2024-02-27
Payer: COMMERCIAL

## 2024-02-27 ENCOUNTER — OFFICE VISIT (OUTPATIENT)
Dept: OPTOMETRY | Facility: CLINIC | Age: 44
End: 2024-02-27
Payer: COMMERCIAL

## 2024-02-27 DIAGNOSIS — H52.223 REGULAR ASTIGMATISM OF BOTH EYES: ICD-10-CM

## 2024-02-27 DIAGNOSIS — E11.9 TYPE 2 DIABETES MELLITUS WITHOUT RETINOPATHY (H): ICD-10-CM

## 2024-02-27 DIAGNOSIS — H52.13 MYOPIA OF BOTH EYES: ICD-10-CM

## 2024-02-27 DIAGNOSIS — Z01.01 ENCOUNTER FOR EXAMINATION OF EYES AND VISION WITH ABNORMAL FINDINGS: Primary | ICD-10-CM

## 2024-02-27 PROCEDURE — 92014 COMPRE OPH EXAM EST PT 1/>: CPT | Performed by: OPTOMETRIST

## 2024-02-27 PROCEDURE — V2020 VISION SVCS FRAMES PURCHASES: HCPCS | Performed by: OPTOMETRIST

## 2024-02-27 PROCEDURE — V2784 LENS POLYCARB OR EQUAL: HCPCS | Performed by: OPTOMETRIST

## 2024-02-27 PROCEDURE — V2100 LENS SPHER SINGLE PLANO 4.00: HCPCS | Mod: LT | Performed by: OPTOMETRIST

## 2024-02-27 ASSESSMENT — REFRACTION_WEARINGRX
OD_CYLINDER: +0.50
OS_CYLINDER: +0.50
OD_SPHERE: -2.25
OS_AXIS: 156
OD_AXIS: 033
OS_SPHERE: -4.00

## 2024-02-27 ASSESSMENT — VISUAL ACUITY
OS_PH_SC: 20/70
METHOD: SNELLEN - LINEAR
OD_SC+: -1
OD_SC: 20/50
OS_SC: 20/100

## 2024-02-27 ASSESSMENT — CONF VISUAL FIELD
OD_SUPERIOR_NASAL_RESTRICTION: 0
OS_SUPERIOR_TEMPORAL_RESTRICTION: 0
OD_NORMAL: 1
OS_INFERIOR_NASAL_RESTRICTION: 0
OD_SUPERIOR_TEMPORAL_RESTRICTION: 0
OS_INFERIOR_TEMPORAL_RESTRICTION: 0
OS_SUPERIOR_NASAL_RESTRICTION: 0
OD_INFERIOR_TEMPORAL_RESTRICTION: 0
OD_INFERIOR_NASAL_RESTRICTION: 0
OS_NORMAL: 1

## 2024-02-27 ASSESSMENT — TONOMETRY
OS_IOP_MMHG: 17
OD_IOP_MMHG: 17
IOP_METHOD: APPLANATION

## 2024-02-27 ASSESSMENT — SLIT LAMP EXAM - LIDS
COMMENTS: NORMAL
COMMENTS: NORMAL

## 2024-02-27 ASSESSMENT — REFRACTION_MANIFEST
OD_CYLINDER: +0.50
OD_SPHERE: -3.00
OS_SPHERE: -4.00
OD_AXIS: 047
OS_CYLINDER: SPHERE

## 2024-02-27 ASSESSMENT — CUP TO DISC RATIO
OS_RATIO: 0.2
OD_RATIO: 0.25

## 2024-02-27 ASSESSMENT — EXTERNAL EXAM - LEFT EYE: OS_EXAM: NORMAL

## 2024-02-27 ASSESSMENT — EXTERNAL EXAM - RIGHT EYE: OD_EXAM: NORMAL

## 2024-02-27 NOTE — PROGRESS NOTES
Chief Complaint   Patient presents with    Diabetic Eye Exam         Last Eye Exam: 7-20-21  Dilated Previously: Yes    Lab Results   Component Value Date    A1C 7.1 02/13/2024    A1C 11.5 01/25/2023    A1C 11.7 12/05/2022    A1C 6.0 08/02/2022    A1C 6.1 09/07/2021    A1C 12.1 05/12/2021    A1C 6.6 01/11/2021    A1C 5.6 02/11/2020    A1C 5.2 10/22/2019    A1C 11.2 04/24/2019         What are you currently using to see?  does not use glasses or contacts       Distance Vision Acuity: Noticed gradual change in both eyes    Near Vision Acuity: Not satisfied     Eye Comfort: dry  Do you use eye drops? : No  Occupation or Hobbies: construction         Medical, surgical and family histories reviewed and updated 2/27/2024.       OBJECTIVE: See Ophthalmology exam    ASSESSMENT:    ICD-10-CM    1. Encounter for examination of eyes and vision with abnormal findings  Z01.01       2. Type 2 diabetes mellitus without retinopathy (H)  E11.9       3. Myopia of both eyes  H52.13       4. Regular astigmatism of both eyes  H52.223           PLAN:     Patient Instructions   Patient educated on importance of good blood sugar control.  Letter sent to primary care provider with diabetic eye exam report.     Updated glasses prescription provided today.   Allow 2 weeks to adapt to the new glasses.     Return in 1 year for a comprehensive eye exam, or sooner if needed.      The effects of the dilating drops last for 4- 6 hours.  You will be more sensitive to light and vision will be blurry up close.  Mydriatic sunglasses were given if needed.     Eliezer Lo, OD  [facility]  [address] [place] [address]. [place]  [address]    [phone]

## 2024-02-27 NOTE — LETTER
2/27/2024         RE: Wenceslao Hutson  [address] [address]        Dear Colleague,    Thank you for referring your patient, Wenceslao Hutson, to the [facility]. Please see a copy of my visit note below.    Chief Complaint   Patient presents with     Diabetic Eye Exam         Last Eye Exam: 7-20-21  Dilated Previously: Yes    Lab Results   Component Value Date    A1C 7.1 02/13/2024    A1C 11.5 01/25/2023    A1C 11.7 12/05/2022    A1C 6.0 08/02/2022    A1C 6.1 09/07/2021    A1C 12.1 05/12/2021    A1C 6.6 01/11/2021    A1C 5.6 02/11/2020    A1C 5.2 10/22/2019    A1C 11.2 04/24/2019         What are you currently using to see?  does not use glasses or contacts       Distance Vision Acuity: Noticed gradual change in both eyes    Near Vision Acuity: Not satisfied     Eye Comfort: dry  Do you use eye drops? : No  Occupation or Hobbies: construction         Medical, surgical and family histories reviewed and updated 2/27/2024.       OBJECTIVE: See Ophthalmology exam    ASSESSMENT:    ICD-10-CM    1. Encounter for examination of eyes and vision with abnormal findings  Z01.01       2. Type 2 diabetes mellitus without retinopathy (H)  E11.9       3. Myopia of both eyes  H52.13       4. Regular astigmatism of both eyes  H52.223           PLAN:     Patient Instructions   Patient educated on importance of good blood sugar control.  Letter sent to primary care provider with diabetic eye exam report.     Updated glasses prescription provided today.   Allow 2 weeks to adapt to the new glasses.     Return in 1 year for a comprehensive eye exam, or sooner if needed.      The effects of the dilating drops last for 4- 6 hours.  You will be more sensitive to light and vision will be blurry up close.  Mydriatic sunglasses were given if needed.     Eliezer Lo, OD  [facility]  [address] [place]  [address]    [phone]        Again, thank you for  allowing me to participate in the care of your patient.        Sincerely,        Eliezer Lo OD

## 2024-02-27 NOTE — PATIENT INSTRUCTIONS
Patient educated on importance of good blood sugar control.  Letter sent to primary care provider with diabetic eye exam report.     Updated glasses prescription provided today.   Allow 2 weeks to adapt to the new glasses.     Return in 1 year for a comprehensive eye exam, or sooner if needed.      The effects of the dilating drops last for 4- 6 hours.  You will be more sensitive to light and vision will be blurry up close.  Mydriatic sunglasses were given if needed.     Eliezer Lo, OD  [facility]  [address] [place] [address]. [place]  [place]  [address]    [phone]

## 2024-03-12 ENCOUNTER — MYC MEDICAL ADVICE (OUTPATIENT)
Dept: FAMILY MEDICINE | Facility: CLINIC | Age: 44
End: 2024-03-12

## 2024-03-12 ENCOUNTER — APPOINTMENT (OUTPATIENT)
Dept: OPTOMETRY | Facility: CLINIC | Age: 44
End: 2024-03-12
Payer: COMMERCIAL

## 2024-03-12 PROCEDURE — 92340 FIT SPECTACLES MONOFOCAL: CPT | Performed by: OPTOMETRIST

## 2024-04-03 ASSESSMENT — SLEEP AND FATIGUE QUESTIONNAIRES
HOW LIKELY ARE YOU TO NOD OFF OR FALL ASLEEP WHILE SITTING AND TALKING TO SOMEONE: MODERATE CHANCE OF DOZING
HOW LIKELY ARE YOU TO NOD OFF OR FALL ASLEEP WHILE SITTING AND READING: MODERATE CHANCE OF DOZING
HOW LIKELY ARE YOU TO NOD OFF OR FALL ASLEEP WHILE LYING DOWN TO REST IN THE AFTERNOON WHEN CIRCUMSTANCES PERMIT: MODERATE CHANCE OF DOZING
HOW LIKELY ARE YOU TO NOD OFF OR FALL ASLEEP WHILE SITTING QUIETLY AFTER LUNCH WITHOUT ALCOHOL: SLIGHT CHANCE OF DOZING
HOW LIKELY ARE YOU TO NOD OFF OR FALL ASLEEP IN A CAR, WHILE STOPPED FOR A FEW MINUTES IN TRAFFIC: WOULD NEVER DOZE
HOW LIKELY ARE YOU TO NOD OFF OR FALL ASLEEP WHILE SITTING INACTIVE IN A PUBLIC PLACE: SLIGHT CHANCE OF DOZING
HOW LIKELY ARE YOU TO NOD OFF OR FALL ASLEEP WHEN YOU ARE A PASSENGER IN A CAR FOR AN HOUR WITHOUT A BREAK: SLIGHT CHANCE OF DOZING
HOW LIKELY ARE YOU TO NOD OFF OR FALL ASLEEP WHILE WATCHING TV: MODERATE CHANCE OF DOZING

## 2024-04-10 ENCOUNTER — VIRTUAL VISIT (OUTPATIENT)
Dept: SLEEP MEDICINE | Facility: CLINIC | Age: 44
End: 2024-04-10
Attending: FAMILY MEDICINE
Payer: COMMERCIAL

## 2024-04-10 VITALS — WEIGHT: 198 LBS | HEIGHT: 64 IN | BODY MASS INDEX: 33.8 KG/M2

## 2024-04-10 DIAGNOSIS — R06.83 LOUD SNORING: ICD-10-CM

## 2024-04-10 DIAGNOSIS — R06.81 WITNESSED EPISODE OF APNEA: Primary | ICD-10-CM

## 2024-04-10 DIAGNOSIS — E66.01 MORBID OBESITY (H): ICD-10-CM

## 2024-04-10 PROCEDURE — 99204 OFFICE O/P NEW MOD 45 MIN: CPT | Mod: 95 | Performed by: INTERNAL MEDICINE

## 2024-04-10 ASSESSMENT — PAIN SCALES - GENERAL: PAINLEVEL: NO PAIN (0)

## 2024-04-10 NOTE — PROGRESS NOTES
[facility]   Outpatient Sleep Medicine Visit - Video Clinical Encounter  April 10, 2024    Name: Wenceslao Hutson MRN# [id number]   Age: 43 year old YOB: 1980     Date of Consultation: April 10, 2024  Consultation is requested by: Casey Sheldon MD  [address] [place] [address]  Primary care provider: Casey Sheldon              Assessment and Plan:   Wenceslao Hutson is a 43 year old male,  with history of hypertension, type 2 diabetes, class II obesity (BMI 33.29) who is seen for his poor sleep, loud snoring and witnessed apneic episodes for over 2 decades.  His history and clinical presentation has a high pretest probability for obstructive sleep apnea.  In the absence of any significant cardiopulmonary comorbidities a type III home sleep test is a reasonable tool to screen for and determine the severity of obstructive sleep apnea.  Sleep disordered breathing. Patient does have positive family history, crowded upper airway despite history of tonsillectomy and uvulectomy and facial morphological features which leads to a high pretest probability for obstructive sleep apnea.  He has without evidence of advanced cardiopulmonary disease and hence a type III home sleep study is a reasonable screening tool to determine the presence and severity of sleep apnea.  Insufficient sleep.  On average he is sleeping between 5-1/2 to 6 hours particularly on the weekdays.    Excessive daytime sleepiness.  Primarily due to poor quality of sleep likely due to untreated sleep apnea and insufficient sleep as described above.      Comorbid Diagnoses:    Class II obesity.  Hypertension.    Summary Recommendations:    NOx T3 home sleep study to screen for and determine the severity of obstructive sleep apnea.  Patient is strongly advised to avoid driving and operating heavy machinery when drowsy.    Summary Counseling:  Patient was provided with educational material for  sleep disordered breathing and treatment options.  Results of his sleep study will be discussed with him once available.  A follow-up visit in clinic will be scheduled in 3 months.    Check out [email or web address]           History of Present Illness:     Wenceslao Hutson is a 43 year old male,  with history of hypertension type 2 diabetes and class II obesity is here with complaint of longstanding snoring and poor sleep.  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  He has his alarm set at 7 AM on workdays but usually wakes up between 5 AM to 6 AM and has difficulty falling back to sleep.  He does not feel rested and struggles with alertness and subjective daytime sleepiness.  Once back home, he usually tries to get to his bed around 10:30 PM and will fall asleep between 11 PM to 12 AM.  Over the weekends he was sleep around 11 PM to midnight and will wake up between 8 AM to 9 AM.  During the night he will wake up at least twice either tossing and turning, gasping or the need to use the bathroom.  These awakenings can last between 10 minutes to half an hour every night.  Over the weekend he will take a nap around noon sometimes up to 2 hours.    Patient denies having any episodes of hypnopompic, hypnopompic hallucinations, does not report of having sleep paralysis or cataplexy.  Denies having symptoms of orthopnea or paroxysmal nocturnal dyspnea.    CURRENT THERAPY-Subjective:  Sleep wake schedule:   Workday bedtime 11 PM  Awakening at 6 AM before his set alarm at 7 AM  Nonworkday bedtime 11 PM to 12 AM Awakening 8 AM - 9 AM   2-3 Awakenings for 10 minutes to 30 minutes every night /week  Naps: noon over the weekend for up to 2 hours.      He does not feel rested in the morning.    [place] Sleepiness Scale: 11/24           Medications:     Current Outpatient Medications   Medication Sig Dispense Refill    ACCU-CHEK GUIDE test strip USE TO TEST BLOOD SUGAR 2 TIMES DAILY OR AS DIRECTED. TO  [employer]: BLOOD GLUCOSE MONITOR BRANDS PER INSUREANCE 100 strip 1    ACE/ARB/ARNI NOT PRESCRIBED (INTENTIONAL) Please choose reason not prescribed from choices below.      ACE/ARB/ARNI NOT PRESCRIBED, INTENTIONAL, Please choose reason not prescribed,as he is allergic      alcohol swab prep pads Use to swab area of injection/sydni as directed. 100 each 3    amLODIPine (NORVASC) 5 MG tablet Take 1 tablet (5 mg) by mouth daily 90 tablet 3    blood glucose calibration (NO BRAND SPECIFIED) solution To accompany: Blood Glucose Monitor Brands: per insurance. 1 each 3    blood glucose monitoring (NO BRAND SPECIFIED) meter device kit Use to test blood sugar 1 times daily or as directed. Preferred blood glucose meter OR supplies to accompany: Blood Glucose Monitor Brands: per insurance. 1 kit 0    cetirizine (ZYRTEC) 10 MG tablet Take 1 tablet (10 mg) by mouth every evening 90 tablet 3    EPINEPHrine (ANY BX GENERIC EQUIV) 0.3 MG/0.3ML injection 2-pack Inject 0.3 mLs (0.3 mg) into the muscle as needed for anaphylaxis 2 each 4    Insulin Glargine-yfgn 100 UNIT/ML SOPN ADMINISTER 20 UNITS UNDER THE SKIN AT BEDTIME 15 mL 0    insulin pen needle (31G X 6 MM) 31G X 6 MM miscellaneous Use 1 pen needles daily or as directed. 100 each 1    meloxicam (MOBIC) 15 MG tablet Take 1 tablet (15 mg) by mouth daily 30 tablet 2    metFORMIN (GLUCOPHAGE) 1000 MG tablet TAKE 1 TABLET(1000 MG) BY MOUTH TWICE DAILY WITH MEALS 180 tablet 3    omeprazole (PRILOSEC) 20 MG DR capsule Take 1 capsule (20 mg) by mouth daily 90 capsule 0    rosuvastatin (CRESTOR) 10 MG tablet Take 1 tablet (10 mg) by mouth daily 90 tablet 3    thin (NO BRAND SPECIFIED) lancets Use with lanceting device. To accompany: Blood Glucose Monitor Brands: per insurance. 100 each 6     No current facility-administered medications for this visit.        Allergies   Allergen Reactions    Lisinopril Swelling     probable    Atorvastatin             Past Medical History:     Does not  need 02 supplement at night   Past Medical History:   Diagnosis Date    Chronic tonsillitis     Diabetes (H)     Hypertension              Past Surgical History:    h/o  upper airway surgery: Tonsillectomy and uvulectomy about 8 years ago.  Past Surgical History:   Procedure Laterality Date    SHOULDER SURGERY  2007-Left    TONSILLECTOMY  12/22/2011    Procedure:TONSILLECTOMY; Tonsillectomy; Surgeon:NAA CASIANO; Location: OR            Physical Examination:     Vitals:  No vitals were obtained today due to virtual visit.    Physical Exam   EYES: Eyes grossly normal to inspection.  No discharge or erythema, or obvious scleral/conjunctival abnormalities.  SKIN: Visible skin clear. No significant rash, abnormal pigmentation or lesions.  NEURO: Cranial nerves grossly intact.  Mentation and speech appropriate for age.  GENERAL: Healthy, alert and no distress  RESP: No audible wheeze, cough, or visible cyanosis.  No visible retractions or increased work of breathing.    PSYCH: Mentation appears normal, affect normal/bright, judgement and insight intact, normal speech and appearance well-groomed.    Total of 47 of time was spent with patient, this included the interview and exam, and review of the chart/labs/imaging/sleep study/PAP therapy data on 04/10/2024. Greater than 50% of which was spent counseling and coordinating care.     Copy to: Casey Sheldon MD 4/10/2024     [facility]   [address], [address]   [address] [place] [address]   Appointments: [phone]

## 2024-04-10 NOTE — NURSING NOTE
Has patient had flu shot for current/most recent flu season? If so, when? No      Is the patient currently in the state of [place]? YES    Visit mode:VIDEO    If the visit is dropped, the patient can be reconnected by: VIDEO VISIT: Text to cell phone:   Telephone Information:   Mobile [phone]       Will anyone else be joining the visit? NO  (If patient encounters technical issues they should call [phone]    How would you like to obtain your AVS? MyChart    Are changes needed to the allergy or medication list? No    Are refills needed on medications prescribed by this physician?     Reason for visit: Consult    Stefanie SANDERS

## 2024-04-10 NOTE — PATIENT INSTRUCTIONS
"          MY TREATMENT INFORMATION FOR SLEEP APNEA-  Wenceslao Hutson    DOCTOR : Anyi Webb MD    Am I having a sleep study at a sleep center?  --->Due to normal delays, you will be contacted within 2-4 weeks to schedule    Am I having a home sleep study?  --->Watch the video for the device you are using:    -/drop off device-   https://www.[employer].com/watch?v=yGGFBdELGhk    -Disposable device sent out require phone/computer application-   https://www.365looks ([employer]me).com/watch?v=BCce_vbiwxE      Frequently asked questions:  1. What is Obstructive Sleep Apnea (VALERY)? VALERY is the most common type of sleep apnea. Apnea means, \"without breath.\"  Apnea is most often caused by narrowing or collapse of the upper airway as muscles relax during sleep.   Almost everyone has occasional apneas. Most people with sleep apnea have had brief interruptions at night frequently for many years.  The severity of sleep apnea is related to how frequent and severe the events are.   2. What are the consequences of VALERY? Symptoms include: feeling sleepy during the day, snoring loudly, gasping or stopping of breathing, trouble sleeping, and occasionally morning headaches or heartburn at night.  Sleepiness can be serious and even increase the risk of falling asleep while driving. Other health consequences may include development of high blood pressure and other cardiovascular disease in persons who are susceptible. Untreated VALERY  can contribute to heart disease, stroke and diabetes.   3. What are the treatment options? In most situations, sleep apnea is a lifelong disease that must be managed with daily therapy. Medications are not effective for sleep apnea and surgery is generally not considered until other therapies have been tried. Your treatment is your choice . Continuous Positive Airway (CPAP) works right away and is the therapy that is effective in nearly everyone. An oral device to hold your jaw forward is usually the next most " reliable option. Other options include postioning devices (to keep you off your back), weight loss, and surgery including a tongue pacing device. There is more detail about some of these options below.  4. Are my sleep studies covered by insurance? Although we will request verification of coverage, we advise you also check in advance of the study to ensure there is coverage.    Important tips for those choosing CPAP and similar devices  REMEMBER-IF YOU RECEIVE A CALL FROM  [phone]-->IT IS TO SETUP A DEVICE  For new devices, sign up for device SHALINI to monitor your device for your followup visits  We encourage you to utilize the [employer] shalini or website ( https://[employer]/ ) to monitor your therapy progress and share the data with your healthcare team when you discuss your sleep apnea.                                                    Know your equipment:  CPAP is continuous positive airway pressure that prevents obstructive sleep apnea by keeping the throat from collapsing while you are sleeping. In most cases, the device is  smart  and can slowly self-adjusts if your throat collapses and keeps a record every day of how well you are treated-this information is available to you and your care team.  BPAP is bilevel positive airway pressure that keeps your throat open and also assists each breath with a pressure boost to maintain adequate breathing.  Special kinds of BPAP are used in patients who have inadequate breathing from lung or heart disease. In most cases, the device is  smart  and can slowly self-adjusts to assist breathing. Like CPAP, the device keeps a record of how well you are treated.  Your mask is your connection to the device. You get to choose what feels most comfortable and the staff will help to make sure if fits. Here: are some examples of the different masks that are available: Magnetic mask aids may assist with use but there are safety issues that should be addressed when  considering with magnets* ( see end of discussion).       Key points to remember on your journey with sleep apnea:  Sleep study.  PAP devices often need to be adjusted during a sleep study to show that they are effective and adjusted right.  Good tips to remember: Try wearing just the mask during a quiet time during the day so your body adapts to wearing it. A humidifier is recommended for comfort in most cases to prevent drying of your nose and throat. Allergy medication from your provider may help you if you are having nasal congestion.  Getting settled-in. It takes more than one night for most of us to get used to wearing a mask. Try wearing just the mask during a quiet time during the day so your body adapts to wearing it. A humidifier is recommended for comfort in most cases. Our team will work with you carefully on the first day and will be in contact within 4 days and again at 2 and 4 weeks for advice and remote device adjustments. Your therapy is evaluated by the device each day.   Use it every night. The more you are able to sleep naturally for 7-8 hours, the more likely you will have good sleep and to prevent health risks or symptoms from sleep apnea. Even if you use it 4 hours it helps. Occasionally all of us are unable to use a medical therapy, in sleep apnea, it is not dangerous to miss one night.   Communicate. Call our skilled team on the number provided on the first day if your visit for problems that make it difficult to wear the device. Over 2 out of 3 patients can learn to wear the device long-term with help from our team. Remember to call our team or your sleep providers if you are unable to wear the device as we may have other solutions for those who cannot adapt to mask CPAP therapy. It is recommended that you sleep your sleep provider within the first 3 months and yearly after that if you are not having problems.   Use it for your health. We encourage use of CPAP masks during daytime quiet  periods to allow your face and brain to adapt to the sensation of CPAP so that it will be a more natural sensation to awaken to at night or during naps. This can be very useful during the first few weeks or months of adapting to CPAP though it does not help medically to wear CPAP during wakefulness and  should not be used as a strategy just to meet guidelines.  Take care of your equipment. Make sure you clean your mask and tubing using directions every day and that your filter and mask are replaced as recommended or if they are not working.     *Masks with magnets:  Updated Contraindications  Masks with magnetic components are contraindicated for use by patients where they, or anyone in close physical contact while using the mask, have the following:   Active medical implants that interact with magnets (i.e., pacemakers, implantable cardioverter defibrillators (ICD), neurostimulators, cerebrospinal fluid (CSF) shunts, insulin/infusion pumps)   Metallic implants/objects containing ferromagnetic material (i.e., aneurysm clips/flow disruption devices, embolic coils, stents, valves, electrodes, implants to restore hearing or balance with implanted magnets, ocular implants, metallic splinters in the eye)  Updated Warning  Keep the mask magnets at a safe distance of at least 6 inches (150 mm) away from implants or medical devices that may be adversely affected by magnetic interference. This warning applies to you or anyone in close physical contact with your mask. The magnets are in the frame and lower headgear clips, with a magnetic field strength of up to 400mT. When worn, they connect to secure the mask but may inadvertently detach while asleep.  Implants/medical devices, including those listed within contraindications, may be adversely affected if they change function under external magnetic fields or contain ferromagnetic materials that attract/repel to magnetic fields (some metallic implants, e.g., contact lenses  with metal, dental implants, metallic cranial plates, screws, igor hole covers, and bone substitute devices). Consult your physician and  of your implant / other medical device for information on the potential adverse effects of magnetic fields.    BESIDES CPAP, WHAT OTHER THERAPIES ARE THERE?    Positioning Device  Positioning devices are generally used when sleep apnea is mild and only occurs on your back.This example shows a pillow that straps around the waist. It may be appropriate for those whose sleep study shows milder sleep apnea that occurs primarily when lying flat on one's back. Preliminary studies have shown benefit but effectiveness at home may need to be verified by a home sleep test. These devices are generally not covered by medical insurance.  Examples of devices that maintain sleeping on the back to prevent snoring and mild sleep apnea.    Belt type body positioner  http://[employer]/    Electronic reminder  http://nightshifttherapy.[employer]/            Oral Appliance  What is oral appliance therapy?  An oral appliance device fits on your teeth at night like a retainer used after having braces. The device is made by a specialized dentist and requires several visits over 1-2 months before a manufactured device is made to fit your teeth and is adjusted to prevent your sleep apnea. Once an oral device is working properly, snoring should be improved. A home sleep test may be recommended at that time if to determine whether the sleep apnea is adequately treated.       Some things to remember:  -Oral devices are often, but not always, covered by your medical insurance. Be sure to check with your insurance provider.   -If you are referred for oral therapy, you will be given a list of specialized dentists to consider or you may choose to visit the Web site of the American Academy of Dental Sleep Medicine  -Oral devices are less likely to work if you have severe sleep apnea or are extremely  overweight.     More detailed information  An oral appliance is a small acrylic device that fits over the upper and lower teeth  (similar to a retainer or a mouth guard). This device slightly moves jaw forward, which moves the base of the tongue forward, opens the airway, improves breathing for effective treat snoring and obstructive sleep apnea in perhaps 7 out of 10 people .  The best working devices are custom-made by a dental device  after a mold is made of the teeth 1, 2, 3.  When is an oral appliance indicated?  Oral appliance therapy is recommended as a first-line treatment for patients with primary snoring, mild sleep apnea, and for patients with moderate sleep apnea who prefer appliance therapy to use of CPAP4, 5. Severity of sleep apnea is determined by sleep testing and is based on the number of respiratory events per hour of sleep.   How successful is oral appliance therapy?  The success rate of oral appliance therapy in patients with mild sleep apnea is 75-80% while in patients with moderate sleep apnea it is 50-70%. The chance of success in patients with severe sleep apnea is 40-50%. The research also shows that oral appliances have a beneficial effect on the cardiovascular health of VALERY patients at the same magnitude as CPAP therapy7.  Oral appliances should be a second-line treatment in cases of severe sleep apnea, but if not completely successful then a combination therapy utilizing CPAP plus oral appliance therapy may be effective. Oral appliances tend to be effective in a broad range of patients although studies show that the patients who have the highest success are females, younger patients, those with milder disease, and less severe obesity. 3, 6.   Finding a dentist that practices dental sleep medicine  Specific training is available through the American Academy of Dental Sleep Medicine for dentists interested in working in the field of sleep. To find a dentist who is educated in  the field of sleep and the use of oral appliances, near you, visit the Web site of the American Academy of Dental Sleep Medicine.    References  1. Nani, et al. Objectively measured vs self-reported compliance during oral appliance therapy for sleep-disordered breathing. Chest 2013; 144(5): 3471-4498.  2. Annette et al. Objective measurement of compliance during oral appliance therapy for sleep-disordered breathing. Thorax 2013; 68(1): 91-96.  3. Mahi et al. Mandibular advancement devices in 620 men and women with VALERY and snoring: tolerability and predictors of treatment success. Chest 2004; 125: 0294-7942.  4. Yecenia, et al. Oral appliances for snoring and VALERY: a review. Sleep 2006; 29: 244-262.  5. Bethel et al. Oral appliance treatment for VALERY: an update. J Clin Sleep Med 2014; 10(2): 215-227.  6. Robin et al. Predictors of OSAH treatment outcome. J Dent Res 2007; 86: 0732-3564.      Weight Loss:   Your Body mass index is 33.99 kg/m .    Being overweight does not necessarily mean you will have health consequences.  Those who have BMI over 35 or over 27 with existing medical conditions carries greater risk.   Weight loss decreases severity of sleep apnea in most people with obesity. For those with mild obesity who have developed snoring with weight gain, even 15-30 pound weight loss can improve and occasionally milder eliminate sleep apnea.  Structured and life-long dietary and health habits are necessary to lose weight and keep healthier weight levels.     The Comprehensive Weight loss program offers all aspects of weight loss strategies including two Non-Surgical Weight Loss Programs: Medical Weight Management and our 24 Week Healthy Lifestyle Program:    Medical Weight Management: You will meet with a Medical Weight Management Provider, as well as a Registered Dietician. The program may include medication therapy, dietary education, recommended exercise and physical therapy programs,  monthly support group meetings, and possible psychological counseling. Follow up visits with the provider or dietician are scheduled based on your progress and needs.    24 Week Healthy Lifestyle Program: This unique program is designed to give you the support of weekly appointments and activities thru a 24-week period. It may include all of the components of the basic program (above), with the addition of 11 individual Health  Visits, 24-week access to the Pay4later website for over 700 online classes, and monthly support group meetings. This program has an out-of-pocket expense of $499 to cover the items that can not be billed to insurance (health coaches and Pay4later access), and is non-refundable/non-transferable (you may be able to use a Health Savings Account; ask your HSA provider). There may be an optional meal replacement plan prescribed as well.   Surgical management achieves meaningful long-term weight loss and improvement in health risks in most patients with more severe obesity.      Sleep Apnea Surgery:    Surgery for obstructive sleep apnea is considered generally only when other therapies fail to work. Surgery may be discussed with you if you are having a difficult time tolerating CPAP and or when there is an abnormal structure that requires surgical correction.  Nose and throat surgeries often enlarge the airway to prevent collapse.  Most of these surgeries create pain for 1-2 weeks and up to half of the most common surgeries are not effective throughout life.  You should carefully discuss the benefits and drawbacks to surgery with your sleep provider and surgeon to determine if it is the best solution for you.   More information  Surgery for VALERY is directed at areas that are responsible for narrowing or complete obstruction of the airway during sleep.  There are a wide range of procedures available to enlarge and/or stabilize the airway to prevent blockage of breathing in the three major  areas where it can occur: the palate, tongue, and nasal regions.  Successful surgical treatment depends on the accurate identification of the factors responsible for obstructive sleep apnea in each person.  A personalized approach is required because there is no single treatment that works well for everyone.  Because of anatomic variation, consultation with an examination by a sleep surgeon is a critical first step in determining what surgical options are best for each patient.  In some cases, examination during sedation may be recommended in order to guide the selection of procedures.  Patients will be counseled about risks and benefits as well as the typical recovery course after surgery. Surgery is typically not a cure for a person s VALERY.  However, surgery will often significantly improve one s VALERY severity (termed  success rate ).  Even in the absence of a cure, surgery will decrease the cardiovascular risk associated with OSA7; improve overall quality of life8 (sleepiness, functionality, sleep quality, etc).      Palate Procedures:  Patients with VALERY often have narrowing of their airway in the region of their tonsils and uvula.  The goals of palate procedures are to widen the airway in this region as well as to help the tissues resist collapse.  Modern palate procedure techniques focus on tissue conservation and soft tissue rearrangement, rather than tissue removal.  Often the uvula is preserved in this procedure. Residual sleep apnea is common in patient after pharyngoplasty with an average reduction in sleep apnea events of 33%2.      Tongue Procedures:  ExamWhile patients are awake, the muscles that surround the throat are active and keep this region open for breathing. These muscles relax during sleep, allowing the tongue and other structures to collapse and block breathing.  There are several different tongue procedures available.  Selection of a tongue base procedure depends on characteristics seen on  physical exam.  Generally, procedures are aimed at removing bulky tissues in this area or preventing the back of the tongue from falling back during sleep.  Success rates for tongue surgery range from 50-62%3.    Hypoglossal Nerve Stimulation:  Hypoglossal nerve stimulation has recently received approval from the [place] Food and Drug Administration for the treatment of obstructive sleep apnea.  This is based on research showing that the system was safe and effective in treating sleep apnea6.  Results showed that the median AHI score decreased 68%, from 29.3 to 9.0. This therapy uses an implant system that senses breathing patterns and delivers mild stimulation to airway muscles, which keeps the airway open during sleep.  The system consists of three fully implanted components: a small generator (similar in size to a pacemaker), a breathing sensor, and a stimulation lead.  Using a small handheld remote, a patient turns the therapy on before bed and off upon awakening.    Candidates for this device must be greater than 18 years of age, have moderate to severe obstructive sleep apnea with less than 25% central events  (AHI between 15-65), BMI less than 35, have tried CPAP/oral appliance for at least 8 weeks without success, and have appropriate upper airway anatomy (determined by a sleep endoscopy performed by Dr. Akira Cutler or Dr. Jhony Young).    Nasal Procedures:  Nasal obstruction can interfere with nasal breathing during the day and night.  Studies have shown that relief of nasal obstruction can improve the ability of some patients to tolerate positive airway pressure therapy for obstructive sleep apnea1.  Treatment options include medications such as nasal saline, topical corticosteroid and antihistamine sprays, and oral medications such as antihistamines or decongestants. Non-surgical treatments can include external nasal dilators for selected patients. If these are not successful by themselves,  surgery can improve the nasal airway either alone or in combination with these other options.        Combination Procedures:  Combination of surgical procedures and other treatments may be recommended, particularly if patients have more than one area of narrowing or persistent positional disease.  The success rate of combination surgery ranges from 66-80%2,3.    References  Tasneem MILLS. The Role of the Nose in Snoring and Obstructive Sleep Apnoea: An Update.  Eur Arch Otorhinolaryngol. 2011; 268: 1365-73.   Amy SM; Nitish JA; Amanda JR; Pallanch JF; Renuka MB; Khris SG; Ricardo CARVALHO. Surgical modifications of the upper airway for obstructive sleep apnea in adults: a systematic review and meta-analysis. SLEEP 2010;33(10):9570-1353. Gena SCHMID. Hypopharyngeal surgery in obstructive sleep apnea: an evidence-based medicine review.  Arch Otolaryngol Head Neck Surg. 2006 Feb;132(2):206-13.  Ludwin YH1, Raymond Y, Massimo KAREN. The efficacy of anatomically based multilevel surgery for obstructive sleep apnea. Otolaryngol Head Neck Surg. 2003 Oct;129(4):327-35.  Gena SCHMID, Goldberg A. Hypopharyngeal Surgery in Obstructive Sleep Apnea: An Evidence-Based Medicine Review. Arch Otolaryngol Head Neck Surg. 2006 Feb;132(2):206-13.  Keshav BARTLETT et al. Upper-Airway Stimulation for Obstructive Sleep Apnea.  N Engl J Med. 2014 Jan 9;370(2):139-49.  Esau Y et al. Increased Incidence of Cardiovascular Disease in Middle-aged Men with Obstructive Sleep Apnea. Am J Respir Crit Care Med; 2002 166: 159-165  Cervantes EM et al. Studying Life Effects and Effectiveness of Palatopharyngoplasty (SLEEP) study: Subjective Outcomes of Isolated Uvulopalatopharyngoplasty. Otolaryngol Head Neck Surg. 2011; 144: 623-631.        WHAT IF I ONLY HAVE SNORING?    Mandibular advancement devices, lateral sleep positioning, long-term weight loss and treatment of nasal allergies have been shown to improve snoring.  Exercising tongue muscles with a game  (https://[employer]/us/shalini/soundly-reduce-snoring/oj0871627164) or stimulating the tongue during the day with a device (https://doi.org/10.3390/rsl02436665) have improved snoring in some individuals.  https://www.[employer]/  https://www.sleepfoundation.org/best-anti-snoring-mouthpieces-and-mouthguards    Remember to Drive Safe... Drive Alive     Sleep health profoundly affects your health, mood, and your safety.  Thirty three percent of the population (one in three of us) is not getting enough sleep and many have a sleep disorder. Not getting enough sleep or having an untreated / undertreated sleep condition may make us sleepy without even knowing it. In fact, our driving could be dramatically impaired due to our sleep health. As your provider, here are some things I would like you to know about driving:     Here are some warning signs for impairment and dangerous drowsy driving:              -Having been awake more than 16 hours               -Looking tired               -Eyelid drooping              -Head nodding (it could be too late at this point)              -Driving for more than 30 minutes     Some things you could do to make the driving safer if you are experiencing some drowsiness:              -Stop driving and rest              -Call for transportation              -Make sure your sleep disorder is adequately treated     Some things that have been shown NOT to work when experiencing drowsiness while driving:              -Turning on the radio              -Opening windows              -Eating any  distracting  /  entertaining  foods (e.g., sunflower seeds, candy, or any other)              -Talking on the phone      Your decision may not only impact your life, but also the life of others. Please, remember to drive safe for yourself and all of us.

## 2024-04-10 NOTE — PROGRESS NOTES
"Virtual Visit Details    Type of service:  Video Visit   Video Start Time: {video visit start/end time for provider to select:192834}  Video End Time:{video visit start/end time for provider to select:104071}    Originating Location (pt. Location): {video visit patient location:614175::\"Home\"}  {PROVIDER LOCATION On-site should be selected for visits conducted from your clinic location or adjoining [facility] hospital, academic office, or other nearby [facility] building. Off-site should be selected for all other provider locations, including home:701729}  Distant Location (provider location):  {virtual location provider:857829}  Platform used for Video Visit: {Virtual Visit Platforms:495785::\"Datical\"}  "

## 2024-05-17 ENCOUNTER — VIRTUAL VISIT (OUTPATIENT)
Dept: FAMILY MEDICINE | Facility: CLINIC | Age: 44
End: 2024-05-17
Attending: FAMILY MEDICINE
Payer: COMMERCIAL

## 2024-05-17 DIAGNOSIS — G47.9 SLEEP DISORDER: Primary | ICD-10-CM

## 2024-05-17 PROCEDURE — 99441 PR PHYSICIAN TELEPHONE EVALUATION 5-10 MIN: CPT | Mod: 93 | Performed by: FAMILY MEDICINE

## 2024-05-17 NOTE — PROGRESS NOTES
Wenceslao is a 43 year old who is being evaluated via a billable telephone visit.    What phone number would you like to be contacted at? [phone]  How would you like to obtain your AVS? Kevin  Originating Location (pt. Location): Home  Distant Location (provider location):  On-site    Assessment & Plan     Sleep disorder    Sleep study has been ordered for June 18; will await results.    See Patient Instructions       Subjective   Wenceslao is a 43 year old, presenting for the following health issues:  Discuss Sleep Study  Had evaluation for sleep disorder, symptoms consistent with sleep disordered breathing, insufficient sleep and excessive daytime sleepiness consistent with VALERY.     DM2:   Last A1c: 7.1 (2/24)      5/17/2024     1:31 PM   Additional Questions   Roomed by ROXANE STRICKLAND   Accompanied by self - telephone visit     History of Present Illness       Diabetes:   He presents for follow up of diabetes.  He is checking home blood glucose a few times a month.   He checks blood glucose before and after meals.  Blood glucose is sometimes over 200 and never under 70. He is aware of hypoglycemia symptoms including shakiness, weakness, blurred vision and confusion.   He is concerned about blood sugar frequently over 200.   He is having blurry vision and weight gain.               Objective    Vitals - Patient Reported  Pain Score: No Pain (0)        Physical Exam   General: Alert and no distress //Respiratory: No audible wheeze, cough, or shortness of breath // Psychiatric:  Appropriate affect, tone, and pace of words        Phone call duration: 10 minutes  Signed Electronically by: Casey Sheldon MD

## 2024-06-14 ASSESSMENT — SLEEP AND FATIGUE QUESTIONNAIRES
HOW LIKELY ARE YOU TO NOD OFF OR FALL ASLEEP WHEN YOU ARE A PASSENGER IN A CAR FOR AN HOUR WITHOUT A BREAK: MODERATE CHANCE OF DOZING
HOW LIKELY ARE YOU TO NOD OFF OR FALL ASLEEP WHILE LYING DOWN TO REST IN THE AFTERNOON WHEN CIRCUMSTANCES PERMIT: HIGH CHANCE OF DOZING
HOW LIKELY ARE YOU TO NOD OFF OR FALL ASLEEP WHILE SITTING AND READING: HIGH CHANCE OF DOZING
HOW LIKELY ARE YOU TO NOD OFF OR FALL ASLEEP WHILE SITTING QUIETLY AFTER LUNCH WITHOUT ALCOHOL: MODERATE CHANCE OF DOZING
HOW LIKELY ARE YOU TO NOD OFF OR FALL ASLEEP WHILE SITTING INACTIVE IN A PUBLIC PLACE: MODERATE CHANCE OF DOZING
HOW LIKELY ARE YOU TO NOD OFF OR FALL ASLEEP IN A CAR, WHILE STOPPED FOR A FEW MINUTES IN TRAFFIC: WOULD NEVER DOZE
HOW LIKELY ARE YOU TO NOD OFF OR FALL ASLEEP WHILE SITTING AND TALKING TO SOMEONE: MODERATE CHANCE OF DOZING
HOW LIKELY ARE YOU TO NOD OFF OR FALL ASLEEP WHILE WATCHING TV: HIGH CHANCE OF DOZING

## 2024-06-17 ENCOUNTER — MYC REFILL (OUTPATIENT)
Dept: FAMILY MEDICINE | Facility: CLINIC | Age: 44
End: 2024-06-17
Payer: COMMERCIAL

## 2024-06-17 DIAGNOSIS — K21.00 GASTROESOPHAGEAL REFLUX DISEASE WITH ESOPHAGITIS WITHOUT HEMORRHAGE: ICD-10-CM

## 2024-06-17 DIAGNOSIS — T78.2XXD ANAPHYLAXIS, SUBSEQUENT ENCOUNTER: ICD-10-CM

## 2024-06-17 DIAGNOSIS — E11.65 TYPE 2 DIABETES MELLITUS WITH HYPERGLYCEMIA, WITH LONG-TERM CURRENT USE OF INSULIN (H): ICD-10-CM

## 2024-06-17 DIAGNOSIS — Z79.4 TYPE 2 DIABETES MELLITUS WITH HYPERGLYCEMIA, WITH LONG-TERM CURRENT USE OF INSULIN (H): ICD-10-CM

## 2024-06-17 DIAGNOSIS — M22.2X1 PATELLOFEMORAL ARTHRALGIA OF BOTH KNEES: ICD-10-CM

## 2024-06-17 DIAGNOSIS — E78.5 HYPERLIPIDEMIA ASSOCIATED WITH TYPE 2 DIABETES MELLITUS (H): ICD-10-CM

## 2024-06-17 DIAGNOSIS — M22.2X2 PATELLOFEMORAL ARTHRALGIA OF BOTH KNEES: ICD-10-CM

## 2024-06-17 DIAGNOSIS — E11.69 HYPERLIPIDEMIA ASSOCIATED WITH TYPE 2 DIABETES MELLITUS (H): ICD-10-CM

## 2024-06-17 DIAGNOSIS — E11.9 TYPE 2 DIABETES MELLITUS WITHOUT COMPLICATION, WITHOUT LONG-TERM CURRENT USE OF INSULIN (H): ICD-10-CM

## 2024-06-17 DIAGNOSIS — E11.69 TYPE 2 DIABETES MELLITUS WITH OTHER SPECIFIED COMPLICATION, WITHOUT LONG-TERM CURRENT USE OF INSULIN (H): ICD-10-CM

## 2024-06-17 DIAGNOSIS — I10 ESSENTIAL HYPERTENSION: ICD-10-CM

## 2024-06-17 RX ORDER — ROSUVASTATIN CALCIUM 10 MG/1
10 TABLET, COATED ORAL DAILY
Qty: 90 TABLET | Refills: 3 | OUTPATIENT
Start: 2024-06-17

## 2024-06-17 RX ORDER — BLOOD SUGAR DIAGNOSTIC
STRIP MISCELLANEOUS
Qty: 300 STRIP | Refills: 0 | Status: SHIPPED | OUTPATIENT
Start: 2024-06-17

## 2024-06-17 RX ORDER — GLUCOSAMINE HCL/CHONDROITIN SU 500-400 MG
CAPSULE ORAL
Qty: 100 EACH | Refills: 3 | Status: SHIPPED | OUTPATIENT
Start: 2024-06-17 | End: 2024-06-17

## 2024-06-17 RX ORDER — GLUCOSAMINE HCL/CHONDROITIN SU 500-400 MG
CAPSULE ORAL
Qty: 100 EACH | Refills: 0 | Status: SHIPPED | OUTPATIENT
Start: 2024-06-17 | End: 2024-06-17

## 2024-06-17 RX ORDER — CETIRIZINE HYDROCHLORIDE 10 MG/1
10 TABLET ORAL EVERY EVENING
Qty: 90 TABLET | Refills: 2 | Status: SHIPPED | OUTPATIENT
Start: 2024-06-17

## 2024-06-17 RX ORDER — LANCETS
EACH MISCELLANEOUS
Qty: 100 EACH | Refills: 0 | Status: SHIPPED | OUTPATIENT
Start: 2024-06-17

## 2024-06-17 RX ORDER — AMLODIPINE BESYLATE 5 MG/1
5 TABLET ORAL DAILY
Qty: 90 TABLET | Refills: 3 | OUTPATIENT
Start: 2024-06-17

## 2024-06-17 RX ORDER — INSULIN GLARGINE-YFGN 100 [IU]/ML
20 INJECTION, SOLUTION SUBCUTANEOUS AT BEDTIME
Qty: 30 ML | Refills: 0 | Status: SHIPPED | OUTPATIENT
Start: 2024-06-17 | End: 2024-06-18

## 2024-06-17 RX ORDER — GLUCOSAMINE HCL/CHONDROITIN SU 500-400 MG
CAPSULE ORAL
Qty: 100 EACH | Refills: 3 | Status: SHIPPED | OUTPATIENT
Start: 2024-06-17

## 2024-06-17 RX ORDER — EPINEPHRINE 0.3 MG/.3ML
0.3 INJECTION SUBCUTANEOUS PRN
Qty: 2 EACH | Refills: 1 | Status: SHIPPED | OUTPATIENT
Start: 2024-06-17

## 2024-06-17 RX ORDER — MELOXICAM 15 MG/1
15 TABLET ORAL DAILY PRN
Qty: 90 TABLET | Refills: 0 | Status: SHIPPED | OUTPATIENT
Start: 2024-06-17

## 2024-06-18 ENCOUNTER — OFFICE VISIT (OUTPATIENT)
Dept: SLEEP MEDICINE | Facility: CLINIC | Age: 44
End: 2024-06-18
Attending: INTERNAL MEDICINE
Payer: COMMERCIAL

## 2024-06-18 ENCOUNTER — MYC REFILL (OUTPATIENT)
Dept: FAMILY MEDICINE | Facility: CLINIC | Age: 44
End: 2024-06-18

## 2024-06-18 ENCOUNTER — TELEPHONE (OUTPATIENT)
Dept: FAMILY MEDICINE | Facility: CLINIC | Age: 44
End: 2024-06-18

## 2024-06-18 DIAGNOSIS — E11.9 TYPE 2 DIABETES MELLITUS WITHOUT COMPLICATION, WITHOUT LONG-TERM CURRENT USE OF INSULIN (H): ICD-10-CM

## 2024-06-18 DIAGNOSIS — E11.65 TYPE 2 DIABETES MELLITUS WITH HYPERGLYCEMIA, WITH LONG-TERM CURRENT USE OF INSULIN (H): ICD-10-CM

## 2024-06-18 DIAGNOSIS — R06.81 WITNESSED EPISODE OF APNEA: ICD-10-CM

## 2024-06-18 DIAGNOSIS — R06.83 LOUD SNORING: ICD-10-CM

## 2024-06-18 DIAGNOSIS — E66.01 MORBID OBESITY (H): ICD-10-CM

## 2024-06-18 DIAGNOSIS — Z79.4 TYPE 2 DIABETES MELLITUS WITH HYPERGLYCEMIA, WITH LONG-TERM CURRENT USE OF INSULIN (H): ICD-10-CM

## 2024-06-18 PROCEDURE — 95800 SLP STDY UNATTENDED: CPT

## 2024-06-18 RX ORDER — INSULIN GLARGINE-YFGN 100 [IU]/ML
20 INJECTION, SOLUTION SUBCUTANEOUS AT BEDTIME
Qty: 30 ML | Refills: 2 | Status: SHIPPED | OUTPATIENT
Start: 2024-06-18

## 2024-06-18 NOTE — PROGRESS NOTES
Pt is completing a home sleep test. Pt was instructed on how to put on the Noxturnal T3 device and associated equipment before going to bed and given the opportunity to practice putting it on before leaving the sleep center. Pt was reminded to bring the home sleep test kit back to the center tomorrow, at agreed upon time for download and reporting.   Neck circumference: 16.5 CM / 42 inches.

## 2024-06-18 NOTE — TELEPHONE ENCOUNTER
Received call from pharmacist at [facility] regarding Rx that they received today for Insulin Glargine-yfgn. This is not covered by patient's insurance. She is requesting verbal okay to switch to Lantus or Levemir (whichever is covered). Verbal approval given (per RN therapeutic substitution medication protocol).    Norah Hutchinson RN

## 2024-06-19 ENCOUNTER — DOCUMENTATION ONLY (OUTPATIENT)
Dept: SLEEP MEDICINE | Facility: CLINIC | Age: 44
End: 2024-06-19
Attending: INTERNAL MEDICINE
Payer: COMMERCIAL

## 2024-06-19 DIAGNOSIS — E11.9 TYPE 2 DIABETES MELLITUS WITHOUT COMPLICATION, WITHOUT LONG-TERM CURRENT USE OF INSULIN (H): ICD-10-CM

## 2024-06-19 RX ORDER — SEMAGLUTIDE 1.34 MG/ML
INJECTION, SOLUTION SUBCUTANEOUS
Qty: 1.5 ML | Refills: 1 | Status: SHIPPED | OUTPATIENT
Start: 2024-06-19 | End: 2024-08-14

## 2024-06-19 NOTE — PROGRESS NOTES
HST POST-STUDY QUESTIONNAIRE    What time did you go to bed?  8pm  How long do you think it took to fall asleep?  20mins  What time did you wake up to start the day?  0300  Did you get up during the night at all?  yes  If you woke up, do you remember approximately what time(s)? 11pm  Did you have any difficulty with the equipment?  No  Did you us any type of treatment with this study?  UAS  Was the head of the bed elevated? No  Did you sleep in a recliner?  No  Did you stop using CPAP at least 3 days before this test?  No no answer  Any other information you'd like us to know? I did not sleep long or at all really. I kept waking up and I stay awake. My finger was being pinched to hard I think I had my nose piece in backwards

## 2024-06-21 NOTE — PROGRESS NOTES
This HSAT was performed using a Noxturnal T3 device which recorded snore, sound, movement activity, body position, nasal pressure, oronasal thermal airflow, pulse, oximetry and both chest and abdominal respiratory effort. HSAT data was restricted to the time patient states they were in bed.     HSAT was scored using 1B 4% hypopnea rule.     HST AHI (Non-PAT): 2.8  Snoring was reported as mild.  Time with SpO2 below 89% was 2.9 minutes.   Overall signal quality was good     Pt will follow up with sleep provider to determine appropriate therapy.

## 2024-06-22 DIAGNOSIS — E11.9 TYPE 2 DIABETES MELLITUS WITHOUT COMPLICATION, WITHOUT LONG-TERM CURRENT USE OF INSULIN (H): ICD-10-CM

## 2024-06-24 ENCOUNTER — MYC MEDICAL ADVICE (OUTPATIENT)
Dept: SLEEP MEDICINE | Facility: CLINIC | Age: 44
End: 2024-06-24
Payer: COMMERCIAL

## 2024-06-24 DIAGNOSIS — Z72.820 POOR SLEEP: ICD-10-CM

## 2024-06-24 DIAGNOSIS — R06.83 SNORING: Primary | ICD-10-CM

## 2024-06-24 RX ORDER — LANCETS
EACH MISCELLANEOUS
OUTPATIENT
Start: 2024-06-24

## 2024-06-24 NOTE — PROCEDURES
"HOME SLEEP STUDY INTERPRETATION        Patient: Wenceslao Hutson  MRN: [id number]  YOB: 1980  Study Date: 2024  PCP/Referring Provider: Casey Sheldon;   Ordering Provider: Tracey De Santiago MD         Indications for Home Study: Wenceslao Hutson is a 44 year old male with a history of hypertension, type 2 diabetes, class II obesity who presents with symptoms suggestive of obstructive sleep apnea.    Estimated body mass index is 33.99 kg/m  as calculated from the following:    Height as of 4/10/24: 1.626 m (5' 4\").    Weight as of 4/10/24: 89.8 kg (198 lb).  [place] Sleepiness Scale:   STOP-BAN/8        Data: A full night home sleep study was performed recording the standard physiologic parameters including body position, movement, sound, nasal pressure, thermal oral airflow, chest and abdominal movements with respiratory inductance plethysmography, and oxygen saturation by pulse oximetry. Pulse rate was estimated by oximetry recording. This study was considered adequate based on > 4 hours of quality oximetry and respiratory recording. As specified by the AASM Manual for the Scoring of Sleep and Associated events, version 2.3, Rule VIII.D 1B, 4% oxygen desaturation scoring for hypopneas is used as a standard of care on all home sleep apnea testing.        Analysis Time:  529.3 minutes        Respiration:   Sleep Associated Hypoxemia: sustained hypoxemia was not present. Baseline oxygen saturation was 92%.  Time with saturation less than or equal to 88% was 2.9 minutes. The lowest oxygen saturation was 82%.   Snoring: Snoring was present.  Respiratory events: The home study revealed a presence of 0 obstructive apneas and 5 mixed and central apneas. There were 20 hypopneas resulting in a combined apnea/hypopnea index [AHI] of 2.8 events per hour.  AHI was 4.3 per hour supine, 3.5 per hour prone, 5.2 per hour on left side, and 1 per hour on right side.   Pattern: Excluding events noted " above, respiratory rate and pattern was Normal.      Position: Percent of time spent: supine - 2.6%, prone - 31.9%, on left - 21.7%, on right - 43.3%.      Heart Rate: By pulse oximetry normal rate was noted.       Assessment:   Mild to moderate snoring without evidence of obstructive sleep apnea.  Sleep associated hypoxemia was not present.    Recommendations:  If symptomatic management of snoring is desired, consider oral appliance therapy.  Suggest optimizing sleep hygiene and avoiding sleep deprivation.  Weight management.        Diagnosis Code(s): Snoring R06.83    Electronically signed by: Anyi Webb MD, June 24, 2024   Diplomate, American Board of Internal Medicine, Sleep Medicine

## 2024-07-06 ENCOUNTER — HEALTH MAINTENANCE LETTER (OUTPATIENT)
Age: 44
End: 2024-07-06

## 2024-07-11 ASSESSMENT — SLEEP AND FATIGUE QUESTIONNAIRES
HOW LIKELY ARE YOU TO NOD OFF OR FALL ASLEEP WHILE SITTING INACTIVE IN A PUBLIC PLACE: MODERATE CHANCE OF DOZING
HOW LIKELY ARE YOU TO NOD OFF OR FALL ASLEEP WHEN YOU ARE A PASSENGER IN A CAR FOR AN HOUR WITHOUT A BREAK: WOULD NEVER DOZE
HOW LIKELY ARE YOU TO NOD OFF OR FALL ASLEEP WHILE SITTING AND TALKING TO SOMEONE: MODERATE CHANCE OF DOZING
HOW LIKELY ARE YOU TO NOD OFF OR FALL ASLEEP WHILE SITTING QUIETLY AFTER LUNCH WITHOUT ALCOHOL: HIGH CHANCE OF DOZING
HOW LIKELY ARE YOU TO NOD OFF OR FALL ASLEEP WHILE LYING DOWN TO REST IN THE AFTERNOON WHEN CIRCUMSTANCES PERMIT: MODERATE CHANCE OF DOZING
HOW LIKELY ARE YOU TO NOD OFF OR FALL ASLEEP WHILE SITTING AND READING: HIGH CHANCE OF DOZING
HOW LIKELY ARE YOU TO NOD OFF OR FALL ASLEEP IN A CAR, WHILE STOPPED FOR A FEW MINUTES IN TRAFFIC: WOULD NEVER DOZE
HOW LIKELY ARE YOU TO NOD OFF OR FALL ASLEEP WHILE WATCHING TV: HIGH CHANCE OF DOZING

## 2024-07-15 NOTE — TELEPHONE ENCOUNTER
Patient report of limited sleep during the Home Sleep Study. He would like to repeat the HST which being reordered.

## 2024-07-17 ASSESSMENT — SLEEP AND FATIGUE QUESTIONNAIRES
HOW LIKELY ARE YOU TO NOD OFF OR FALL ASLEEP IN A CAR, WHILE STOPPED FOR A FEW MINUTES IN TRAFFIC: WOULD NEVER DOZE
HOW LIKELY ARE YOU TO NOD OFF OR FALL ASLEEP WHILE WATCHING TV: HIGH CHANCE OF DOZING
HOW LIKELY ARE YOU TO NOD OFF OR FALL ASLEEP WHILE SITTING QUIETLY AFTER LUNCH WITHOUT ALCOHOL: HIGH CHANCE OF DOZING
HOW LIKELY ARE YOU TO NOD OFF OR FALL ASLEEP WHILE SITTING AND READING: HIGH CHANCE OF DOZING
HOW LIKELY ARE YOU TO NOD OFF OR FALL ASLEEP WHEN YOU ARE A PASSENGER IN A CAR FOR AN HOUR WITHOUT A BREAK: WOULD NEVER DOZE
HOW LIKELY ARE YOU TO NOD OFF OR FALL ASLEEP WHILE LYING DOWN TO REST IN THE AFTERNOON WHEN CIRCUMSTANCES PERMIT: MODERATE CHANCE OF DOZING
HOW LIKELY ARE YOU TO NOD OFF OR FALL ASLEEP WHILE SITTING INACTIVE IN A PUBLIC PLACE: MODERATE CHANCE OF DOZING
HOW LIKELY ARE YOU TO NOD OFF OR FALL ASLEEP WHILE SITTING AND TALKING TO SOMEONE: MODERATE CHANCE OF DOZING

## 2024-07-18 ENCOUNTER — OFFICE VISIT (OUTPATIENT)
Dept: SLEEP MEDICINE | Facility: CLINIC | Age: 44
End: 2024-07-18
Payer: COMMERCIAL

## 2024-07-18 VITALS
HEIGHT: 65 IN | WEIGHT: 195.7 LBS | OXYGEN SATURATION: 95 % | HEART RATE: 92 BPM | SYSTOLIC BLOOD PRESSURE: 145 MMHG | DIASTOLIC BLOOD PRESSURE: 99 MMHG | BODY MASS INDEX: 32.61 KG/M2

## 2024-07-18 DIAGNOSIS — G47.10 SLEEPING EXCESSIVE: ICD-10-CM

## 2024-07-18 DIAGNOSIS — R06.83 SNORING: Primary | ICD-10-CM

## 2024-07-18 DIAGNOSIS — Z72.821 INADEQUATE SLEEP HYGIENE: ICD-10-CM

## 2024-07-18 DIAGNOSIS — R06.81 WITNESSED EPISODE OF APNEA: ICD-10-CM

## 2024-07-18 PROCEDURE — 99214 OFFICE O/P EST MOD 30 MIN: CPT | Performed by: INTERNAL MEDICINE

## 2024-07-18 NOTE — PROGRESS NOTES
[facility] [address] [address]  Phone: [phone]    Patient:  Wenceslao Hutson, Date of birth 1980  Date of Visit:  07/18/2024  Referring Provider Tracey De Santiago             [facility]   [facility]  Outpatient Sleep Medicine Consultation  July 18, 2024    Name: Wenceslao Hutson MRN# [id number]   Age: 44 year old YOB: 1980     Date of Consultation: July 18, 2024  Consultation is requested by: Tracey De Santiago MD  [address] [place] [id number]  Primary care provider: Casey Sheldon         Assessment and Plan:   Wenceslao Hutson is a 44 year old male with history of hypertension, type 2 diabetes, class II obesity who recently underwent type III home sleep test.  The qualitative analysis of the study is questionable as the patient is reporting to have little to no sleep through the entirety of the test.  He continues to struggle with interrupted nocturnal sleep and overall excessive sleepiness throughout the day.  He is reporting sometimes over 12 hours of sleep intermittently throughout the day.  Snoring and sleep disordered breathing.  Patient has been scheduled for a repeat type III home sleep test in September.  Excessive sleepiness and increased sleep requirements.  The pathologic hypersomnia is likely due to abnormal sleep quality with a high pretest probability for obstructive sleep apnea.  Unfortunately, the previous home sleep test was not representative as patient had little to no sleep (subjective).  Other possible differential diagnosis includes idiopathic hypersomnolence and even narcolepsy which would require more extensive objective evaluation as needed in future.  Inadequate sleep hygiene.  Wenceslao has been off work for over 8 months now.  He has inconsistent bed and wake time and spend excessive amount of time at home laying in his couch or bed.  On average, he  is sleeping between 11 to 15 hours/day.      Comorbid Diagnoses:    Hypertension.  Type 2 diabetes.  Class II obesity.    Summary Recommendations:    Repeat NOx T3 home sleep test.  If there is any concern for suboptimal testing we may have to conduct a formal in lab type I polysomnogram for more objective evaluation.  He is advised to maintain a consistent bed and wake time.  He can go to bed at 11 PM but should wake up between 7:30 AM to 8 AM for the day.    He strongly advised to avoid any naps and is advised to develop more structure in his daytime activities.  He is strongly advised to avoid driving or operating heavy machinery when drowsy.    Summary Counseling:    Check out [email or web address]           History of Present Illness:     Wenceslao Hutson is a 44 year old male with history of hypertension, type 2 diabetes, class II obesity was recently seen in my clinic on April 10, 2024.  His history clinical practice presentation was highly suggestive for undiagnosed obstructive sleep apnea.  A type III home sleep test was requested and completed on June 18, 2024.  The study with a total analysis time of 529 minutes showed a combined apnea hypopnea index of 2.8 events per hour with a supine apnea-hypopnea index at 4.3 events per hour.  The highest respiratory events were seen during prone position 5.2 events per hour (spent 32% time in prone position).  Although based on the study, there was no evidence of significant sleep disordered breathing, Wenceslao reports that he had limited to no sleep during the study.  He explained, that he was very anxious and remembers laying in his bed unable to fall asleep for any considerable amount of time.  We discussed about reevaluation for sleep disordered breathing and agreed that it is reasonable to repeat the home sleep test.  If the follow-up study shows similar challenges then a more formal and objective evaluation with a type I polysomnogram may be  needed.      PREVIOUS SLEEP STUDIES: NOx T3 home sleep test  Date: 06/18/2024  AHI: 2.8/h, he spent 2.3 minutes below oxygen saturation of 89%.  Patient reports of having a little to no sleep during the entirety of the sleep test.    CURRENT THERAPY-Subjective:  Sleep wake schedule:   Bedtime usually around 11 PM awakening around 730 to 8 AM without an alarm, he will go back to bed at 11 AM and sleeps till 3 PM.    Awakenings 3-4 for up to 60 minutes usually due to gaps arousals, need to use the bathroom/week  Naps: Extensive and multiple napping during the daytime as reported above     He does not feel rested in the morning.    [place] Sleepiness Scale: 15/24    JUAN JOSÉ Total Score: 25      Objective:  CPAP Compliance Targets:   >70% days > 4 hours AHI < 5   30 days ending July 18, 2024         Medications:     Current Outpatient Medications   Medication Sig Dispense Refill    alcohol swab prep pads USE TO SWAB AREA OF INEJCTION DAILY 100 each 3    amLODIPine (NORVASC) 5 MG tablet Take 1 tablet (5 mg) by mouth daily 90 tablet 3    blood glucose (ACCU-CHEK GUIDE) test strip Use to test blood sugar 3 times daily or as directed. 300 strip 0    blood glucose calibration (NO BRAND SPECIFIED) solution To accompany: Blood Glucose Monitor Brands: per insurance. 1 each 0    cetirizine (ZYRTEC) 10 MG tablet Take 1 tablet (10 mg) by mouth every evening 90 tablet 2    EPINEPHrine (ANY BX GENERIC EQUIV) 0.3 MG/0.3ML injection 2-pack Inject 0.3 mLs (0.3 mg) into the muscle as needed for anaphylaxis 2 each 1    Insulin Glargine-yfgn 100 UNIT/ML SOPN Inject 20 Units Subcutaneous at bedtime 30 mL 2    insulin pen needle (31G X 6 MM) 31G X 6 MM miscellaneous Use 1 pen needles daily or as directed. 100 each 0    meloxicam (MOBIC) 15 MG tablet Take 1 tablet (15 mg) by mouth daily as needed for moderate pain 90 tablet 0    metFORMIN (GLUCOPHAGE) 1000 MG tablet TAKE 1 TABLET(1000 MG) BY MOUTH TWICE DAILY WITH MEALS 180 tablet 3     "omeprazole (PRILOSEC) 20 MG DR capsule Take 1 capsule (20 mg) by mouth daily 90 capsule 0    OZEMPIC, 0.25 OR 0.5 MG/DOSE, 2 MG/1.5ML SOPN pen INJECT 0.25MG EVERY 7 DAYS FOR 4 DOSES, THEN INCREASE TO 0.5MG WEEKLY 1.5 mL 1    rosuvastatin (CRESTOR) 10 MG tablet Take 1 tablet (10 mg) by mouth daily 90 tablet 3    thin (NO BRAND SPECIFIED) lancets Use with lanceting device. To accompany: Blood Glucose Monitor Brands: per insurance. 100 each 0     No current facility-administered medications for this visit.        Allergies   Allergen Reactions    Lisinopril Swelling     probable    Atorvastatin             Past Medical History:     Does not need 02 supplement at night   Past Medical History:   Diagnosis Date    Chronic tonsillitis     Diabetes (H)     Hypertension              Past Surgical History:    No h/o  upper airway surgery  Past Surgical History:   Procedure Laterality Date    SHOULDER SURGERY  2007-Left    TONSILLECTOMY  12/22/2011    Procedure:TONSILLECTOMY; Tonsillectomy; Surgeon:NAA CASIANO; Location: OR          Physical Examination:     Objective   Vitals: BP (!) 143/99   Pulse 92   Ht 1.64 m (5' 4.57\")   Wt 88.8 kg (195 lb 11.2 oz)   SpO2 95%   BMI 33.00 kg/m     healthy, alert, and no distress  Psych: Alert and oriented times 3; coherent speech, normal   rate and volume, able to articulate logical thoughts, able   to abstract reason, no tangential thoughts, no hallucinations   or delusions  His affect is normal    Copy to: Casey Sheldon    Total of 32 minutes of time was spent with patient, this included the interview and exam, and review of the chart/labs/imaging/sleep study/PAP therapy data on 07/18/2024. Greater than 50% of which was spent counseling and coordinating care.     Anyi Webb MD 7/18/2024     [facility]   [address], [address]   [address] [place] [address]   Appointments: " [phone]

## 2024-08-13 ENCOUNTER — TELEPHONE (OUTPATIENT)
Dept: FAMILY MEDICINE | Facility: CLINIC | Age: 44
End: 2024-08-13
Payer: COMMERCIAL

## 2024-08-13 DIAGNOSIS — E11.9 TYPE 2 DIABETES MELLITUS WITHOUT COMPLICATION, WITHOUT LONG-TERM CURRENT USE OF INSULIN (H): ICD-10-CM

## 2024-08-13 NOTE — TELEPHONE ENCOUNTER
Patient Quality Outreach    Patient is due for the following:   Diabetes -  A1C  Physical Preventive Adult Physical    Next Steps:   Schedule a Adult Preventative    Type of outreach:    Sent Forter message.      Questions for provider review:    None           CHRISTINA KENNEDY, CMA

## 2024-08-14 RX ORDER — SEMAGLUTIDE 0.68 MG/ML
0.5 INJECTION, SOLUTION SUBCUTANEOUS
Qty: 3 ML | Refills: 0 | Status: SHIPPED | OUTPATIENT
Start: 2024-08-14 | End: 2024-08-28

## 2024-08-28 DIAGNOSIS — E11.9 TYPE 2 DIABETES MELLITUS WITHOUT COMPLICATION, WITHOUT LONG-TERM CURRENT USE OF INSULIN (H): ICD-10-CM

## 2024-08-29 RX ORDER — SEMAGLUTIDE 0.68 MG/ML
0.5 INJECTION, SOLUTION SUBCUTANEOUS
Qty: 3 ML | Refills: 0 | Status: SHIPPED | OUTPATIENT
Start: 2024-08-29

## 2024-09-15 DIAGNOSIS — K21.00 GASTROESOPHAGEAL REFLUX DISEASE WITH ESOPHAGITIS WITHOUT HEMORRHAGE: ICD-10-CM

## 2024-10-10 DIAGNOSIS — E11.9 TYPE 2 DIABETES MELLITUS WITHOUT COMPLICATION, WITHOUT LONG-TERM CURRENT USE OF INSULIN (H): ICD-10-CM

## 2024-10-11 RX ORDER — SEMAGLUTIDE 0.68 MG/ML
INJECTION, SOLUTION SUBCUTANEOUS
Qty: 3 ML | Refills: 0 | Status: SHIPPED | OUTPATIENT
Start: 2024-10-11 | End: 2024-11-13

## 2024-11-12 DIAGNOSIS — E11.9 TYPE 2 DIABETES MELLITUS WITHOUT COMPLICATION, WITHOUT LONG-TERM CURRENT USE OF INSULIN (H): ICD-10-CM

## 2024-11-13 RX ORDER — SEMAGLUTIDE 0.68 MG/ML
INJECTION, SOLUTION SUBCUTANEOUS
Qty: 3 ML | Refills: 2 | Status: SHIPPED | OUTPATIENT
Start: 2024-11-13 | End: 2024-11-15 | Stop reason: DRUGHIGH

## 2024-11-14 ENCOUNTER — MYC REFILL (OUTPATIENT)
Dept: FAMILY MEDICINE | Facility: CLINIC | Age: 44
End: 2024-11-14
Payer: COMMERCIAL

## 2024-11-14 ENCOUNTER — MYC MEDICAL ADVICE (OUTPATIENT)
Dept: FAMILY MEDICINE | Facility: CLINIC | Age: 44
End: 2024-11-14
Payer: COMMERCIAL

## 2024-11-14 DIAGNOSIS — E11.9 TYPE 2 DIABETES MELLITUS WITHOUT COMPLICATION, WITHOUT LONG-TERM CURRENT USE OF INSULIN (H): ICD-10-CM

## 2024-11-14 RX ORDER — SEMAGLUTIDE 0.68 MG/ML
INJECTION, SOLUTION SUBCUTANEOUS
Qty: 3 ML | Refills: 2 | OUTPATIENT
Start: 2024-11-14

## 2024-11-15 NOTE — TELEPHONE ENCOUNTER
Please see Rumgr message.    Pended Ozempic 1 mg dose if okay. Please review/sign or advise.    Norah Hutchinson RN

## 2024-11-21 DIAGNOSIS — E11.9 TYPE 2 DIABETES MELLITUS WITHOUT COMPLICATION, WITHOUT LONG-TERM CURRENT USE OF INSULIN (H): ICD-10-CM

## 2024-11-21 RX ORDER — SEMAGLUTIDE 1.34 MG/ML
INJECTION, SOLUTION SUBCUTANEOUS
Qty: 3 ML | Refills: 0 | OUTPATIENT
Start: 2024-11-21

## 2024-11-23 ENCOUNTER — HEALTH MAINTENANCE LETTER (OUTPATIENT)
Age: 44
End: 2024-11-23

## 2024-11-26 DIAGNOSIS — M22.2X1 PATELLOFEMORAL ARTHRALGIA OF BOTH KNEES: ICD-10-CM

## 2024-11-26 DIAGNOSIS — M22.2X2 PATELLOFEMORAL ARTHRALGIA OF BOTH KNEES: ICD-10-CM

## 2024-11-26 RX ORDER — MELOXICAM 15 MG/1
TABLET ORAL
Qty: 90 TABLET | Refills: 0 | Status: SHIPPED | OUTPATIENT
Start: 2024-11-26

## 2024-12-16 ENCOUNTER — MYC MEDICAL ADVICE (OUTPATIENT)
Dept: FAMILY MEDICINE | Facility: CLINIC | Age: 44
End: 2024-12-16
Payer: COMMERCIAL

## 2025-01-03 ENCOUNTER — TELEPHONE (OUTPATIENT)
Dept: FAMILY MEDICINE | Facility: CLINIC | Age: 45
End: 2025-01-03
Payer: COMMERCIAL

## 2025-01-03 DIAGNOSIS — E11.9 TYPE 2 DIABETES MELLITUS WITHOUT COMPLICATION, WITHOUT LONG-TERM CURRENT USE OF INSULIN (H): ICD-10-CM

## 2025-01-03 RX ORDER — SEMAGLUTIDE 1.34 MG/ML
INJECTION, SOLUTION SUBCUTANEOUS
Qty: 3 ML | Refills: 0 | OUTPATIENT
Start: 2025-01-03

## 2025-01-03 NOTE — TELEPHONE ENCOUNTER
LVM for patient in regards to Ozempic refill. It appears that patient has a months worth of Ozempic at the [facility] dated 12/23 below and another script was sent to the [facility] and denied. Will need to check if the patient got the dose on 12/23 or if he wanted it at the [facility] location listed below.    12/23 [facility] #[id number] - [place], [place] - [address] AT [facility] [address]     [facility] #[id number] KENNETH ORTIZ - [address] AT [facility] [address]       Neli Wooten RN on 1/3/2025 at 3:00 PM

## 2025-01-06 NOTE — TELEPHONE ENCOUNTER
Called patient, went to . Called [facility] (current not new) and per tech pt already picked up the ozempic. Closing encounter.     Thanks,  RODNEY Thurman  [facility]

## 2025-01-20 ENCOUNTER — OFFICE VISIT (OUTPATIENT)
Dept: FAMILY MEDICINE | Facility: CLINIC | Age: 45
End: 2025-01-20
Payer: COMMERCIAL

## 2025-01-20 VITALS
RESPIRATION RATE: 18 BRPM | DIASTOLIC BLOOD PRESSURE: 85 MMHG | BODY MASS INDEX: 32.17 KG/M2 | WEIGHT: 188.4 LBS | TEMPERATURE: 97.7 F | OXYGEN SATURATION: 99 % | SYSTOLIC BLOOD PRESSURE: 130 MMHG | HEIGHT: 64 IN | HEART RATE: 92 BPM

## 2025-01-20 DIAGNOSIS — I10 ESSENTIAL HYPERTENSION: ICD-10-CM

## 2025-01-20 DIAGNOSIS — E11.9 TYPE 2 DIABETES MELLITUS WITHOUT COMPLICATION, WITHOUT LONG-TERM CURRENT USE OF INSULIN (H): ICD-10-CM

## 2025-01-20 DIAGNOSIS — R00.2 POUNDING HEARTBEAT: ICD-10-CM

## 2025-01-20 DIAGNOSIS — Z79.4 TYPE 2 DIABETES MELLITUS WITHOUT COMPLICATION, WITH LONG-TERM CURRENT USE OF INSULIN (H): Primary | ICD-10-CM

## 2025-01-20 DIAGNOSIS — E11.9 TYPE 2 DIABETES MELLITUS WITHOUT COMPLICATION, WITH LONG-TERM CURRENT USE OF INSULIN (H): Primary | ICD-10-CM

## 2025-01-20 DIAGNOSIS — E66.01 MORBID OBESITY (H): ICD-10-CM

## 2025-01-20 DIAGNOSIS — E78.5 HYPERLIPIDEMIA ASSOCIATED WITH TYPE 2 DIABETES MELLITUS (H): ICD-10-CM

## 2025-01-20 DIAGNOSIS — E11.69 HYPERLIPIDEMIA ASSOCIATED WITH TYPE 2 DIABETES MELLITUS (H): ICD-10-CM

## 2025-01-20 LAB
EST. AVERAGE GLUCOSE BLD GHB EST-MCNC: 114 MG/DL
HBA1C MFR BLD: 5.6 % (ref 0–5.6)

## 2025-01-20 PROCEDURE — 90656 IIV3 VACC NO PRSV 0.5 ML IM: CPT | Performed by: FAMILY MEDICINE

## 2025-01-20 PROCEDURE — 90480 ADMN SARSCOV2 VAC 1/ONLY CMP: CPT | Performed by: FAMILY MEDICINE

## 2025-01-20 PROCEDURE — 90471 IMMUNIZATION ADMIN: CPT | Performed by: FAMILY MEDICINE

## 2025-01-20 PROCEDURE — 99214 OFFICE O/P EST MOD 30 MIN: CPT | Mod: 25 | Performed by: FAMILY MEDICINE

## 2025-01-20 PROCEDURE — 80061 LIPID PANEL: CPT | Performed by: FAMILY MEDICINE

## 2025-01-20 PROCEDURE — 82043 UR ALBUMIN QUANTITATIVE: CPT | Performed by: FAMILY MEDICINE

## 2025-01-20 PROCEDURE — 83036 HEMOGLOBIN GLYCOSYLATED A1C: CPT | Performed by: FAMILY MEDICINE

## 2025-01-20 PROCEDURE — 90472 IMMUNIZATION ADMIN EACH ADD: CPT | Performed by: FAMILY MEDICINE

## 2025-01-20 PROCEDURE — 91320 SARSCV2 VAC 30MCG TRS-SUC IM: CPT | Performed by: FAMILY MEDICINE

## 2025-01-20 PROCEDURE — 80053 COMPREHEN METABOLIC PANEL: CPT | Performed by: FAMILY MEDICINE

## 2025-01-20 PROCEDURE — 36415 COLL VENOUS BLD VENIPUNCTURE: CPT | Performed by: FAMILY MEDICINE

## 2025-01-20 PROCEDURE — 82570 ASSAY OF URINE CREATININE: CPT | Performed by: FAMILY MEDICINE

## 2025-01-20 PROCEDURE — 90715 TDAP VACCINE 7 YRS/> IM: CPT | Performed by: FAMILY MEDICINE

## 2025-01-20 PROCEDURE — 90677 PCV20 VACCINE IM: CPT | Performed by: FAMILY MEDICINE

## 2025-01-20 SDOH — HEALTH STABILITY: PHYSICAL HEALTH: ON AVERAGE, HOW MANY MINUTES DO YOU ENGAGE IN EXERCISE AT THIS LEVEL?: 0 MIN

## 2025-01-20 SDOH — HEALTH STABILITY: PHYSICAL HEALTH: ON AVERAGE, HOW MANY DAYS PER WEEK DO YOU ENGAGE IN MODERATE TO STRENUOUS EXERCISE (LIKE A BRISK WALK)?: 0 DAYS

## 2025-01-20 ASSESSMENT — PAIN SCALES - GENERAL: PAINLEVEL_OUTOF10: SEVERE PAIN (7)

## 2025-01-20 ASSESSMENT — SOCIAL DETERMINANTS OF HEALTH (SDOH): HOW OFTEN DO YOU GET TOGETHER WITH FRIENDS OR RELATIVES?: NEVER

## 2025-01-20 NOTE — PROGRESS NOTES
Preventive Care Visit  [facility] KEESHA Sheldon MD, Family Medicine  Jan 20, 2025      {PROVIDER CHARTING PREFERENCE:381796}    Cyndee Billy is a 44 year old, presenting for the following:  Diabetes   BS    Taking Ozempic and    Rxnlfox82 units;    HPLD:   Only Crestor      HTN    Amlodipine 5 mg    Heart Pounding x 1.5 week  Drinking energy         1/20/2025     4:04 PM   Additional Questions   Roomed by skye Bowman ma   Accompanied by self          HPI  ***    {SUPERLIST (Optional):682181}  {additonal problems for provider to add (Optional):214790}  Health Care Directive  Patient does not have a Health Care Directive: {ADVANCE_DIRECTIVE_STATUS:443531}      1/20/2025   General Health   How would you rate your overall physical health? (!) FAIR   Feel stress (tense, anxious, or unable to sleep) Very much   (!) STRESS CONCERN      1/20/2025   Nutrition   Three or more servings of calcium each day? (!) NO   Diet: Diabetic   How many servings of fruit and vegetables per day? (!) 0-1   How many sweetened beverages each day? 0-1         1/20/2025   Exercise   Days per week of moderate/strenous exercise 0 days   Average minutes spent exercising at this level 0 min   (!) EXERCISE CONCERN      1/20/2025   Social Factors   Frequency of gathering with friends or relatives Never   Worry food won't last until get money to buy more No   Food not last or not have enough money for food? No   Do you have housing? (Housing is defined as stable permanent housing and does not include staying ouside in a car, in a tent, in an abandoned building, in an overnight shelter, or couch-surfing.) Yes   Are you worried about losing your housing? Yes   Lack of transportation? No   Unable to get utilities (heat,electricity)? Yes   Want help with housing or utility concern? No   (!) HOUSING CONCERN PRESENT(!) FINANCIAL RESOURCE STRAIN CONCERN(!) SOCIAL CONNECTIONS CONCERN      1/20/2025   Dental   Dentist two  times every year? (!) NO         2025   TB Screening   Were you born outside of the [place]? No         Today's PHQ-2 Score:       2025     4:02 PM   PHQ-2 (  [employer])   Q1: Little interest or pleasure in doing things 1   Q2: Feeling down, depressed or hopeless 1   PHQ-2 Score 2    Q1: Little interest or pleasure in doing things Several days   Q2: Feeling down, depressed or hopeless Several days   PHQ-2 Score 2       Patient-reported           2025   Substance Use   Alcohol more than 3/day or more than 7/wk No   Do you use any other substances recreationally? No     Social History     Tobacco Use    Smoking status: Former     Current packs/day: 0.00     Types: Cigarettes     Start date: 6/15/2005     Quit date: 2011     Years since quittin.5     Passive exposure: Never    Smokeless tobacco: Former     Quit date: 2011    Tobacco comments:     smoke free household.   Vaping Use    Vaping status: Never Used   Substance Use Topics    Alcohol use: Yes     Comment: on the weekends    Drug use: No     {Provider  If there are gaps in the social history shown above, please follow the link to update and then refresh the note Link to Social and Substance History :949159}      2025   STI Screening   New sexual partner(s) since last STI/HIV test? No   ASCVD Risk   The 10-year ASCVD risk score (Janelle TRIPATHI, et al., 2019) is: 14.7%    Values used to calculate the score:      Age: 44 years      Sex: Male      Is Non- : Yes      Diabetic: Yes      Tobacco smoker: No      Systolic Blood Pressure: 142 mmHg      Is BP treated: Yes      HDL Cholesterol: 37 mg/dL      Total Cholesterol: 157 mg/dL        2025   Contraception/Family Planning   Questions about contraception or family planning No     {Provider  REQUIRED FOR AWV Use the storyboard to review patient history, after sections have been marked as reviewed, refresh note to capture documentation:648992}  "  Reviewed and updated as needed this visit by Provider                    {HISTORY OPTIONS (Optional):518778}    {ROS Picklists (Optional):011255}     Objective    Exam  BP (!) 142/87   Pulse 92   Temp 97.7  F (36.5  C) (Temporal)   Resp 18   Ht 1.635 m (5' 4.37\")   Wt 85.5 kg (188 lb 6.4 oz)   SpO2 99%   BMI 31.97 kg/m     Estimated body mass index is 31.97 kg/m  as calculated from the following:    Height as of this encounter: 1.635 m (5' 4.37\").    Weight as of this encounter: 85.5 kg (188 lb 6.4 oz).    Physical Exam  {Exam Choices (Optional):571994}        Signed Electronically by: Casey Sheldon MD  {Email feedback regarding this note to [email or web address]   :993964}  " "34.9 in adult    Hyperlipidemia associated with type 2 diabetes mellitus (H)    Pulmonary nodules    Hilar adenopathy    Morbid obesity (H)     Past Surgical History:   Procedure Laterality Date    SHOULDER SURGERY  -Left    TONSILLECTOMY  2011    Procedure:TONSILLECTOMY; Tonsillectomy; Surgeon:NAA CASIANO; Location: OR       Social History     Tobacco Use    Smoking status: Former     Current packs/day: 0.00     Types: Cigarettes     Start date: 6/15/2005     Quit date: 2011     Years since quittin.5     Passive exposure: Never    Smokeless tobacco: Former     Quit date: 2011    Tobacco comments:     smoke free household.   Substance Use Topics    Alcohol use: Yes     Comment: on the weekends     Family History   Problem Relation Age of Onset    Breast Cancer Mother              Review of Systems  Constitutional, neuro, ENT, endocrine, pulmonary, cardiac, gastrointestinal, genitourinary, musculoskeletal, integument and psychiatric systems are negative, except as otherwise noted.     Objective    Exam  BP (!) 142/87   Pulse 92   Temp 97.7  F (36.5  C) (Temporal)   Resp 18   Ht 1.635 m (5' 4.37\")   Wt 85.5 kg (188 lb 6.4 oz)   SpO2 99%   BMI 31.97 kg/m     Estimated body mass index is 31.97 kg/m  as calculated from the following:    Height as of this encounter: 1.635 m (5' 4.37\").    Weight as of this encounter: 85.5 kg (188 lb 6.4 oz).    Physical Exam  GENERAL: alert and no distress  EYES: Eyes grossly normal to inspection, PERRL and conjunctivae and sclerae normal  HENT: ear canals and TM's normal, nose and mouth without ulcers or lesions  NECK: no adenopathy, no asymmetry, masses, or scars  RESP: lungs clear to auscultation - no rales, rhonchi or wheezes  CV: regular rate and rhythm, normal S1 S2, no S3 or S4, no murmur, click or rub, no peripheral edema  ABDOMEN: soft, nontender, no hepatosplenomegaly, no masses and bowel sounds normal  MS: no gross musculoskeletal " defects noted, no edema  SKIN: no suspicious lesions or rashes  NEURO: Normal strength and tone, mentation intact and speech normal  PSYCH: mentation appears normal, affect normal/bright    Results for orders placed or performed in visit on 01/20/25   HEMOGLOBIN A1C     Status: Normal   Result Value Ref Range    Estimated Average Glucose 114 <117 mg/dL    Hemoglobin A1C 5.6 0.0 - 5.6 %   Lipid panel reflex to direct LDL Non-fasting     Status: Abnormal   Result Value Ref Range    Cholesterol 148 <200 mg/dL    Triglycerides 168 (H) <150 mg/dL    Direct Measure HDL 37 (L) >=40 mg/dL    LDL Cholesterol Calculated 77 <100 mg/dL    Non HDL Cholesterol 111 <130 mg/dL    Patient Fasting > 8hrs? No     Narrative    Cholesterol  Desirable: < 200 mg/dL  Borderline High: 200 - 239 mg/dL  High: >= 240 mg/dL    Triglycerides  Normal: < 150 mg/dL  Borderline High: 150 - 199 mg/dL  High: 200-499 mg/dL  Very High: >= 500 mg/dL    Direct Measure HDL  Female: >= 50 mg/dL   Male: >= 40 mg/dL    LDL Cholesterol  Desirable: < 100 mg/dL  Above Desirable: 100 - 129 mg/dL   Borderline High: 130 - 159 mg/dL   High:  160 - 189 mg/dL   Very High: >= 190 mg/dL    Non HDL Cholesterol  Desirable: < 130 mg/dL  Above Desirable: 130 - 159 mg/dL  Borderline High: 160 - 189 mg/dL  High: 190 - 219 mg/dL  Very High: >= 220 mg/dL   Albumin Random Urine Quantitative with Creat Ratio     Status: None   Result Value Ref Range    Creatinine Urine mg/dL 412.0 mg/dL    Albumin Urine mg/L 47.6 mg/L    Albumin Urine mg/g Cr 11.55 0.00 - 17.00 mg/g Cr   Comprehensive metabolic panel (BMP + Alb, Alk Phos, ALT, AST, Total. Bili, TP)     Status: Abnormal   Result Value Ref Range    Sodium 141 135 - 145 mmol/L    Potassium 4.2 3.4 - 5.3 mmol/L    Carbon Dioxide (CO2) 22 22 - 29 mmol/L    Anion Gap 11 7 - 15 mmol/L    Urea Nitrogen 8.3 6.0 - 20.0 mg/dL    Creatinine 0.72 0.67 - 1.17 mg/dL    GFR Estimate >90 >60 mL/min/1.73m2    Calcium 9.0 8.8 - 10.4 mg/dL     Chloride 108 (H) 98 - 107 mmol/L    Glucose 89 70 - 99 mg/dL    Alkaline Phosphatase 70 40 - 150 U/L    AST 30 0 - 45 U/L    ALT 56 0 - 70 U/L    Protein Total 6.8 6.4 - 8.3 g/dL    Albumin 4.4 3.5 - 5.2 g/dL    Bilirubin Total 0.2 <=1.2 mg/dL    Patient Fasting > 8hrs? No            Signed Electronically by: Casey Sheldon MD

## 2025-01-21 LAB
ALBUMIN SERPL BCG-MCNC: 4.4 G/DL (ref 3.5–5.2)
ALP SERPL-CCNC: 70 U/L (ref 40–150)
ALT SERPL W P-5'-P-CCNC: 56 U/L (ref 0–70)
ANION GAP SERPL CALCULATED.3IONS-SCNC: 11 MMOL/L (ref 7–15)
AST SERPL W P-5'-P-CCNC: 30 U/L (ref 0–45)
BILIRUB SERPL-MCNC: 0.2 MG/DL
BUN SERPL-MCNC: 8.3 MG/DL (ref 6–20)
CALCIUM SERPL-MCNC: 9 MG/DL (ref 8.8–10.4)
CHLORIDE SERPL-SCNC: 108 MMOL/L (ref 98–107)
CHOLEST SERPL-MCNC: 148 MG/DL
CREAT SERPL-MCNC: 0.72 MG/DL (ref 0.67–1.17)
CREAT UR-MCNC: 412 MG/DL
EGFRCR SERPLBLD CKD-EPI 2021: >90 ML/MIN/1.73M2
FASTING STATUS PATIENT QL REPORTED: NO
FASTING STATUS PATIENT QL REPORTED: NO
GLUCOSE SERPL-MCNC: 89 MG/DL (ref 70–99)
HCO3 SERPL-SCNC: 22 MMOL/L (ref 22–29)
HDLC SERPL-MCNC: 37 MG/DL
LDLC SERPL CALC-MCNC: 77 MG/DL
MICROALBUMIN UR-MCNC: 47.6 MG/L
MICROALBUMIN/CREAT UR: 11.55 MG/G CR (ref 0–17)
NONHDLC SERPL-MCNC: 111 MG/DL
POTASSIUM SERPL-SCNC: 4.2 MMOL/L (ref 3.4–5.3)
PROT SERPL-MCNC: 6.8 G/DL (ref 6.4–8.3)
SODIUM SERPL-SCNC: 141 MMOL/L (ref 135–145)
TRIGL SERPL-MCNC: 168 MG/DL

## 2025-01-22 DIAGNOSIS — E11.69 HYPERLIPIDEMIA ASSOCIATED WITH TYPE 2 DIABETES MELLITUS (H): ICD-10-CM

## 2025-01-22 DIAGNOSIS — E78.5 HYPERLIPIDEMIA ASSOCIATED WITH TYPE 2 DIABETES MELLITUS (H): ICD-10-CM

## 2025-01-22 DIAGNOSIS — E11.69 TYPE 2 DIABETES MELLITUS WITH OTHER SPECIFIED COMPLICATION, WITHOUT LONG-TERM CURRENT USE OF INSULIN (H): ICD-10-CM

## 2025-01-22 DIAGNOSIS — Z79.4 TYPE 2 DIABETES MELLITUS WITH HYPERGLYCEMIA, WITH LONG-TERM CURRENT USE OF INSULIN (H): ICD-10-CM

## 2025-01-22 DIAGNOSIS — E11.65 TYPE 2 DIABETES MELLITUS WITH HYPERGLYCEMIA, WITH LONG-TERM CURRENT USE OF INSULIN (H): ICD-10-CM

## 2025-01-22 DIAGNOSIS — E11.9 TYPE 2 DIABETES MELLITUS WITHOUT COMPLICATION, WITHOUT LONG-TERM CURRENT USE OF INSULIN (H): ICD-10-CM

## 2025-01-22 RX ORDER — ROSUVASTATIN CALCIUM 10 MG/1
10 TABLET, COATED ORAL DAILY
Qty: 90 TABLET | Refills: 3 | Status: SHIPPED | OUTPATIENT
Start: 2025-01-22

## 2025-01-22 RX ORDER — INSULIN GLARGINE-YFGN 100 [IU]/ML
20 INJECTION, SOLUTION SUBCUTANEOUS AT BEDTIME
Qty: 30 ML | Refills: 2 | Status: SHIPPED | OUTPATIENT
Start: 2025-01-22

## 2025-02-18 DIAGNOSIS — E11.69 TYPE 2 DIABETES MELLITUS WITH OTHER SPECIFIED COMPLICATION, WITHOUT LONG-TERM CURRENT USE OF INSULIN (H): ICD-10-CM

## 2025-02-20 ENCOUNTER — TELEPHONE (OUTPATIENT)
Dept: SLEEP MEDICINE | Facility: CLINIC | Age: 45
End: 2025-02-20
Payer: COMMERCIAL

## 2025-02-20 NOTE — TELEPHONE ENCOUNTER
Reason for Call:  Appointment Request    Patient requesting this type of appt:  sleep study - MSLT     Requested provider:  any     Reason patient unable to be scheduled: Needs to be scheduled by clinic    When does patient want to be seen/preferred time:  any    Comments: Central scheduling does not schedule MSLT sleep study appointments     Could we send this information to you in [facility] or would you prefer to receive a phone call?:   Patient would prefer a phone call   Okay to leave a detailed message?: Yes at Home number on file [phone] (home)    Call taken on 2/20/2025 at 1:16 PM by Kyleigh Arambula

## 2025-04-09 DIAGNOSIS — M22.2X1 PATELLOFEMORAL ARTHRALGIA OF BOTH KNEES: ICD-10-CM

## 2025-04-09 DIAGNOSIS — I10 ESSENTIAL HYPERTENSION: ICD-10-CM

## 2025-04-09 DIAGNOSIS — M22.2X2 PATELLOFEMORAL ARTHRALGIA OF BOTH KNEES: ICD-10-CM

## 2025-04-10 RX ORDER — MELOXICAM 15 MG/1
TABLET ORAL
Qty: 90 TABLET | Refills: 0 | Status: SHIPPED | OUTPATIENT
Start: 2025-04-10 | End: 2025-04-10

## 2025-04-10 RX ORDER — MELOXICAM 15 MG/1
15 TABLET ORAL DAILY
Qty: 90 TABLET | Refills: 0 | Status: SHIPPED | OUTPATIENT
Start: 2025-04-10

## 2025-04-10 RX ORDER — AMLODIPINE BESYLATE 5 MG/1
5 TABLET ORAL DAILY
Qty: 90 TABLET | Refills: 1 | Status: SHIPPED | OUTPATIENT
Start: 2025-04-10

## 2025-04-10 NOTE — TELEPHONE ENCOUNTER
Mobic 15 mg sent in error by RN.   Canceled & pended for provider review.     Margarette Gutierrez RN BSN  [facility]

## 2025-05-11 ENCOUNTER — HEALTH MAINTENANCE LETTER (OUTPATIENT)
Age: 45
End: 2025-05-11

## 2025-07-13 ENCOUNTER — HEALTH MAINTENANCE LETTER (OUTPATIENT)
Age: 45
End: 2025-07-13

## 2025-07-17 DIAGNOSIS — I10 ESSENTIAL HYPERTENSION: ICD-10-CM

## 2025-07-17 RX ORDER — AMLODIPINE BESYLATE 5 MG/1
5 TABLET ORAL DAILY
Qty: 90 TABLET | Refills: 1 | OUTPATIENT
Start: 2025-07-17

## 2025-07-31 PROBLEM — E66.811 CLASS 1 OBESITY DUE TO EXCESS CALORIES WITH SERIOUS COMORBIDITY AND BODY MASS INDEX (BMI) OF 34.0 TO 34.9 IN ADULT: Status: RESOLVED | Noted: 2018-04-19 | Resolved: 2025-07-31

## 2025-07-31 PROBLEM — E78.5 HYPERLIPIDEMIA LDL GOAL <100: Status: RESOLVED | Noted: 2018-04-19 | Resolved: 2025-07-31

## 2025-07-31 PROBLEM — E66.09 CLASS 1 OBESITY DUE TO EXCESS CALORIES WITH SERIOUS COMORBIDITY AND BODY MASS INDEX (BMI) OF 34.0 TO 34.9 IN ADULT: Status: RESOLVED | Noted: 2018-04-19 | Resolved: 2025-07-31

## 2025-08-05 ENCOUNTER — OFFICE VISIT (OUTPATIENT)
Dept: FAMILY MEDICINE | Facility: CLINIC | Age: 45
End: 2025-08-05
Payer: COMMERCIAL

## 2025-08-05 VITALS
RESPIRATION RATE: 18 BRPM | SYSTOLIC BLOOD PRESSURE: 125 MMHG | TEMPERATURE: 98.7 F | HEIGHT: 64 IN | DIASTOLIC BLOOD PRESSURE: 85 MMHG | WEIGHT: 185.2 LBS | BODY MASS INDEX: 31.62 KG/M2 | OXYGEN SATURATION: 95 % | HEART RATE: 100 BPM

## 2025-08-05 DIAGNOSIS — F10.90 ALCOHOL USE: ICD-10-CM

## 2025-08-05 DIAGNOSIS — E66.01 MORBID OBESITY (H): ICD-10-CM

## 2025-08-05 DIAGNOSIS — G44.209 TENSION HEADACHE: ICD-10-CM

## 2025-08-05 DIAGNOSIS — Z79.4 TYPE 2 DIABETES MELLITUS WITHOUT COMPLICATION, WITH LONG-TERM CURRENT USE OF INSULIN (H): Primary | ICD-10-CM

## 2025-08-05 DIAGNOSIS — E11.9 TYPE 2 DIABETES MELLITUS WITHOUT COMPLICATION, WITH LONG-TERM CURRENT USE OF INSULIN (H): Primary | ICD-10-CM

## 2025-08-05 LAB
BASOPHILS # BLD AUTO: 0 10E3/UL (ref 0–0.2)
BASOPHILS NFR BLD AUTO: 0 %
EOSINOPHIL # BLD AUTO: 0.1 10E3/UL (ref 0–0.7)
EOSINOPHIL NFR BLD AUTO: 2 %
ERYTHROCYTE [DISTWIDTH] IN BLOOD BY AUTOMATED COUNT: 11.8 % (ref 10–15)
EST. AVERAGE GLUCOSE BLD GHB EST-MCNC: 105 MG/DL
HBA1C MFR BLD: 5.3 % (ref 0–5.6)
HCT VFR BLD AUTO: 49.8 % (ref 40–53)
HGB BLD-MCNC: 16.5 G/DL (ref 13.3–17.7)
IMM GRANULOCYTES # BLD: 0 10E3/UL
IMM GRANULOCYTES NFR BLD: 0 %
LYMPHOCYTES # BLD AUTO: 2.2 10E3/UL (ref 0.8–5.3)
LYMPHOCYTES NFR BLD AUTO: 43 %
MCH RBC QN AUTO: 30.5 PG (ref 26.5–33)
MCHC RBC AUTO-ENTMCNC: 33.1 G/DL (ref 31.5–36.5)
MCV RBC AUTO: 92 FL (ref 78–100)
MONOCYTES # BLD AUTO: 0.4 10E3/UL (ref 0–1.3)
MONOCYTES NFR BLD AUTO: 8 %
NEUTROPHILS # BLD AUTO: 2.4 10E3/UL (ref 1.6–8.3)
NEUTROPHILS NFR BLD AUTO: 47 %
PLATELET # BLD AUTO: 189 10E3/UL (ref 150–450)
RBC # BLD AUTO: 5.41 10E6/UL (ref 4.4–5.9)
WBC # BLD AUTO: 5.2 10E3/UL (ref 4–11)

## 2025-08-05 PROCEDURE — 3044F HG A1C LEVEL LT 7.0%: CPT | Performed by: FAMILY MEDICINE

## 2025-08-05 PROCEDURE — 99214 OFFICE O/P EST MOD 30 MIN: CPT | Performed by: FAMILY MEDICINE

## 2025-08-05 PROCEDURE — 3079F DIAST BP 80-89 MM HG: CPT | Performed by: FAMILY MEDICINE

## 2025-08-05 PROCEDURE — 85025 COMPLETE CBC W/AUTO DIFF WBC: CPT | Performed by: FAMILY MEDICINE

## 2025-08-05 PROCEDURE — 83036 HEMOGLOBIN GLYCOSYLATED A1C: CPT | Performed by: FAMILY MEDICINE

## 2025-08-05 PROCEDURE — 3074F SYST BP LT 130 MM HG: CPT | Performed by: FAMILY MEDICINE

## 2025-08-05 PROCEDURE — 36415 COLL VENOUS BLD VENIPUNCTURE: CPT | Performed by: FAMILY MEDICINE

## 2025-08-05 PROCEDURE — 80053 COMPREHEN METABOLIC PANEL: CPT | Performed by: FAMILY MEDICINE

## 2025-08-05 ASSESSMENT — ENCOUNTER SYMPTOMS: HEADACHES: 1

## 2025-08-06 ENCOUNTER — PATIENT OUTREACH (OUTPATIENT)
Dept: CARE COORDINATION | Facility: CLINIC | Age: 45
End: 2025-08-06
Payer: COMMERCIAL

## 2025-08-06 LAB
ALBUMIN SERPL BCG-MCNC: 4.5 G/DL (ref 3.5–5.2)
ALP SERPL-CCNC: 60 U/L (ref 40–150)
ALT SERPL W P-5'-P-CCNC: 57 U/L (ref 0–70)
ANION GAP SERPL CALCULATED.3IONS-SCNC: 14 MMOL/L (ref 7–15)
AST SERPL W P-5'-P-CCNC: 28 U/L (ref 0–45)
BILIRUB SERPL-MCNC: 0.3 MG/DL
BUN SERPL-MCNC: 12.5 MG/DL (ref 6–20)
CALCIUM SERPL-MCNC: 9.5 MG/DL (ref 8.8–10.4)
CHLORIDE SERPL-SCNC: 107 MMOL/L (ref 98–107)
CREAT SERPL-MCNC: 0.89 MG/DL (ref 0.67–1.17)
EGFRCR SERPLBLD CKD-EPI 2021: >90 ML/MIN/1.73M2
GLUCOSE SERPL-MCNC: 102 MG/DL (ref 70–99)
HCO3 SERPL-SCNC: 19 MMOL/L (ref 22–29)
POTASSIUM SERPL-SCNC: 4.1 MMOL/L (ref 3.4–5.3)
PROT SERPL-MCNC: 6.9 G/DL (ref 6.4–8.3)
SODIUM SERPL-SCNC: 140 MMOL/L (ref 135–145)